# Patient Record
Sex: FEMALE | Race: WHITE | NOT HISPANIC OR LATINO | Employment: FULL TIME | URBAN - METROPOLITAN AREA
[De-identification: names, ages, dates, MRNs, and addresses within clinical notes are randomized per-mention and may not be internally consistent; named-entity substitution may affect disease eponyms.]

---

## 2018-08-14 ENCOUNTER — OFFICE VISIT (OUTPATIENT)
Dept: PODIATRY | Facility: CLINIC | Age: 83
End: 2018-08-14
Payer: MEDICARE

## 2018-08-14 VITALS
HEART RATE: 63 BPM | DIASTOLIC BLOOD PRESSURE: 68 MMHG | HEIGHT: 65 IN | WEIGHT: 140 LBS | TEMPERATURE: 97 F | SYSTOLIC BLOOD PRESSURE: 131 MMHG | BODY MASS INDEX: 23.32 KG/M2

## 2018-08-14 DIAGNOSIS — L60.0 INGROWN NAIL: Primary | ICD-10-CM

## 2018-08-14 PROCEDURE — 99213 OFFICE O/P EST LOW 20 MIN: CPT | Mod: PBBFAC,PN | Performed by: PODIATRIST

## 2018-08-14 PROCEDURE — 99202 OFFICE O/P NEW SF 15 MIN: CPT | Mod: S$PBB,,, | Performed by: PODIATRIST

## 2018-08-14 PROCEDURE — 99999 PR PBB SHADOW E&M-EST. PATIENT-LVL III: CPT | Mod: PBBFAC,,, | Performed by: PODIATRIST

## 2018-08-14 RX ORDER — LEVOTHYROXINE SODIUM 88 UG/1
88 TABLET ORAL
COMMUNITY
Start: 2018-07-17

## 2018-08-14 RX ORDER — METOPROLOL SUCCINATE 25 MG/1
25 TABLET, EXTENDED RELEASE ORAL DAILY
COMMUNITY
Start: 2018-07-16

## 2018-08-19 NOTE — PROGRESS NOTES
Subjective:       Patient ID: Selene Posey is a 83 y.o. female.    Chief Complaint: Nail Problem (big toe nails on both feet) and Foot Problem  Patient presents with a complaint of thickened elongated ingrown toenails on both big toes she states this started shortly after she got a pedicure at a nail salon.  HPI  Review of Systems   Musculoskeletal: Positive for arthralgias, joint swelling and myalgias.   All other systems reviewed and are negative.      Objective:      Physical Exam   Constitutional: She appears well-developed and well-nourished.   Cardiovascular:   Pulses:       Dorsalis pedis pulses are 1+ on the right side, and 1+ on the left side.        Posterior tibial pulses are 0 on the right side, and 0 on the left side.   Musculoskeletal: Normal range of motion.   Feet:   Right Foot:   Protective Sensation: 4 sites tested. 4 sites sensed.   Skin Integrity: Positive for erythema.   Left Foot:   Protective Sensation: 4 sites tested. 4 sites sensed.   Skin Integrity: Positive for erythema.   Skin: Skin is warm. Capillary refill takes more than 3 seconds.   Psychiatric: She has a normal mood and affect. Her behavior is normal. Judgment and thought content normal.   Nursing note and vitals reviewed.      On evaluation patient has obviously fungal infected nails this has caused the nail to become thick incurvated and now display bacterial infection both medial lateral border bilateral hallux which has grown into the surrounding skin again causing the area to be did infected with positive pain upon palpation patient's bilateral hallux hallux nails are obviously infected with fungal involvement they are thickened elongated dystrophic and ingrown is noted.  Assessment:       1. Ingrown nail        Plan:         Following a complete new patient evaluation patient does have ingrown toenails both medial lateral border of the bilateral hallux nail this is secondary to fungal infected nails that have become very  thick and dystrophic I have advised the patient I was able to trim the nail out of medial lateral borders offering her significant relief however these are going to grow back the fact same way she needs to start treating these with Vicks vapor rub twice a day every day to kill the fungus in the nail which will make him easier to trim and easier to keep under control nail avulsion was not necessary on today's visit recommended follow-up as needed.

## 2019-09-17 ENCOUNTER — CLINICAL SUPPORT (OUTPATIENT)
Dept: REHABILITATION | Facility: HOSPITAL | Age: 84
End: 2019-09-17
Payer: MEDICARE

## 2019-09-17 DIAGNOSIS — M65.341 TRIGGER RING FINGER OF RIGHT HAND: Primary | ICD-10-CM

## 2019-09-17 DIAGNOSIS — M25.641 JOINT STIFFNESS OF HAND, RIGHT: ICD-10-CM

## 2019-09-17 PROCEDURE — 97010 HOT OR COLD PACKS THERAPY: CPT | Mod: PN

## 2019-09-17 PROCEDURE — 97165 OT EVAL LOW COMPLEX 30 MIN: CPT | Mod: PN

## 2019-09-17 PROCEDURE — 97035 APP MDLTY 1+ULTRASOUND EA 15: CPT | Mod: PN

## 2019-09-17 NOTE — PLAN OF CARE
[facility]  Occupational Therapy Initial Evaluation    Name: Selene Posey  Clinic Number: [id number]    Therapy Diagnosis:   Encounter Diagnosis   Name Primary?    Trigger ring finger of right hand Yes     Physician: Kofi Ibarra MD    Physician Orders: OT Eval and Treat   Medical Diagnosis from Referral: R ring finger trigger finger   Evaluation Date: 9/17/2019  Authorization Period Expiration: 12/31/2019  Plan of Care Expiration: 10/25/2019  Visit # / Visits authorized: 1    Time In: 1000  Time Out: 1045  Total Billable Time: 45 minutes    Precautions: Standard    Subjective   Date of onset: Patient reports having a ganglion cyst removed several months ago and reporting increasing stiffness of R 3rd, 4th, and 5th digits  History of current condition - Janelle reports: Morning stiffness affecting 3rd, 4th, and 5th digits at the PIP joints. She report stiffness is the worst in the night/morning and once she begins moving her digits, it improve. She reports that while having ganglion cyst removed, she also had trigger finger of R middle finger, and they performed surgery on it, but now complains of trigger finger of R ring finger. Patient also reports feeling numbness in fingertips of R hand at night sometimes.     Medical History:   No past medical history on file.    Surgical History:   Selene Posey  has a past surgical history that includes Appendectomy and Hysterectomy.    Medications:   Selene has a current medication list which includes the following prescription(s): levothyroxine, levothyroxine, metoprolol succinate, omeprazole, and pantoprazole.    Allergies:   Review of patient's allergies indicates:   Allergen Reactions    Codeine     Erythropoietin analogues     Pcn [penicillins]     Sulfa (sulfonamide antibiotics)      Imaging, none available in Epic    Prior Therapy: NA  Social History:  lives alone  Occupation: Real Estate   Prior Level of Function:  Independent  Current Level of Function: Modified Independent-buys items already cut up to reduce cutting with R hand     Pain:  Current 7/10, worst 9/10  Location: right digits 3rd, 4th, and 5th   Description: stiff  Aggravating Factors: Morning  Easing Factors: moving and working out digits     Pts goals:   To correct trigger finger and reduce stiffness of joints R fingers    Objective     OT received consult for 84 year old female with R ring finger trigger finger in addition to stiffness of PIP's at R ring, middle, and little fingers. AROM fingertips to palm WNL; however, catching/lagging of R ring finger present.       TREATMENT   Treatment Time In: 1020  Treatment Time Out: 1045  Total Treatment time separate from Evaluation: 25 minutes    Janelle received hot pack for 12 minutes to R hand/digits.    Janelle received the following direct contact modalities after being cleared for contraindications: Ultrasound:  Selene received ultrasound to manage pain and inflammation at 100 % duty cycle applied to the R hand dorsal and volar at an intensity of  3.3 MHz number entry box 2.0  W/cm2  for a duration of 10 minutes. Patient tolerated treatment well without adverse effects. Therapist was in attendance throughout intervention.    Home Exercises and Patient Education Provided    Education provided:   - Educational material on trigger finger     Written Home Exercises Provided: yes.  Exercises were reviewed and Janelle was able to demonstrate them prior to the end of the session.  Janelle demonstrated good  understanding of the education provided.     See EMR under Media for exercises provided 9/17/2019.    Assessment   Selene is a 84 y.o. female referred to outpatient Occupational Therapy with a medical diagnosis of R ring finger trigger finger. Pt presents with symptomatic catching/trigger finger in addition to complaints of joint stiffness of PIP's of R middle, ring, and little fingers.    Pt prognosis is Good.    Pt will benefit from skilled outpatient Occupational Therapy to address the deficits stated above and in the chart below, provide pt/family education, and to maximize pt's level of independence.     Plan of care discussed with patient: Yes  Pt's spiritual, cultural and educational needs considered and patient is agreeable to the plan of care and goals as stated below:     Anticipated Barriers for therapy: none    Goals:  Patient will receive modalities to R digits to decrease joint stiffness/pain and improve flexibility.  Patient will report pain <4/10 in 3 consecutive treatments R digits 3, 4, and 5.  OT to continually assess for splint needs R ring finger.   Patient will demonstrate independence in HEP at discharge.     Plan   Plan of care Certification: 9/17/2019 to 10/25/2019    Outpatient Occupational Therapy 2 times weekly for 0 weeks to include the following interventions: Moist Heat/ Ice, Paraffin, Therapeutic Exercise, Ultrasound and massage to prevent adhesions.     Mable Peguero, OT   Signature of Therapist    I certify the need for these services furnished under this plan of treatment and while under my care.______________________________                           Physician/Referring Practitioner  Date of Signature:

## 2019-09-17 NOTE — PROGRESS NOTES
[facility]  Occupational Therapy Initial Evaluation    Name: Selene Posey  Clinic Number: [id number]    Therapy Diagnosis:   Encounter Diagnosis   Name Primary?    Trigger ring finger of right hand Yes     Physician: Kofi Ibarra MD    Physician Orders: OT Eval and Treat   Medical Diagnosis from Referral: R ring finger trigger finger   Evaluation Date: 9/17/2019  Authorization Period Expiration: 12/31/2019  Plan of Care Expiration: 10/25/2019  Visit # / Visits authorized: 1    Time In: 1000  Time Out: 1045  Total Billable Time: 45 minutes    Precautions: Standard    Subjective   Date of onset: Patient reports having a ganglion cyst removed several months ago and reporting increasing stiffness of R 3rd, 4th, and 5th digits  History of current condition - Janelle reports: Morning stiffness affecting 3rd, 4th, and 5th digits at the PIP joints. She report stiffness is the worst in the night/morning and once she begins moving her digits, it improve. She reports that while having ganglion cyst removed, she also had trigger finger of R middle finger, and they performed surgery on it, but now complains of trigger finger of R ring finger. Patient also reports feeling numbness in fingertips of R hand at night sometimes.     Medical History:   No past medical history on file.    Surgical History:   Selene Posey  has a past surgical history that includes Appendectomy and Hysterectomy.    Medications:   Selene has a current medication list which includes the following prescription(s): levothyroxine, levothyroxine, metoprolol succinate, omeprazole, and pantoprazole.    Allergies:   Review of patient's allergies indicates:   Allergen Reactions    Codeine     Erythropoietin analogues     Pcn [penicillins]     Sulfa (sulfonamide antibiotics)      Imaging, none available in Epic    Prior Therapy: NA  Social History:  lives alone  Occupation: Real Estate   Prior Level of Function:  Independent  Current Level of Function: Modified Independent-buys items already cut up to reduce cutting with R hand     Pain:  Current 7/10, worst 9/10  Location: right digits 3rd, 4th, and 5th   Description: stiff  Aggravating Factors: Morning  Easing Factors: moving and working out digits     Pts goals:   To correct trigger finger and reduce stiffness of joints R fingers    Objective     OT received consult for 84 year old female with R ring finger trigger finger in addition to stiffness of PIP's at R ring, middle, and little fingers. AROM fingertips to palm WNL; however, catching/lagging of R ring finger present.       TREATMENT   Treatment Time In: 1020  Treatment Time Out: 1045  Total Treatment time separate from Evaluation: 25 minutes    Janelle received hot pack for 12 minutes to R hand/digits.    Janelle received the following direct contact modalities after being cleared for contraindications: Ultrasound:  Selene received ultrasound to manage pain and inflammation at 100 % duty cycle applied to the R hand dorsal and volar at an intensity of  3.3 MHz number entry box 2.0  W/cm2  for a duration of 10 minutes. Patient tolerated treatment well without adverse effects. Therapist was in attendance throughout intervention.    Home Exercises and Patient Education Provided    Education provided:   - Educational material on trigger finger     Written Home Exercises Provided: yes.  Exercises were reviewed and Janelle was able to demonstrate them prior to the end of the session.  Janelle demonstrated good  understanding of the education provided.     See EMR under Media for exercises provided 9/17/2019.    Assessment   Selene is a 84 y.o. female referred to outpatient Occupational Therapy with a medical diagnosis of R ring finger trigger finger. Pt presents with symptomatic catching/trigger finger in addition to complaints of joint stiffness of PIP's of R middle, ring, and little fingers.    Pt prognosis is Good.    Pt will benefit from skilled outpatient Occupational Therapy to address the deficits stated above and in the chart below, provide pt/family education, and to maximize pt's level of independence.     Plan of care discussed with patient: Yes  Pt's spiritual, cultural and educational needs considered and patient is agreeable to the plan of care and goals as stated below:     Anticipated Barriers for therapy: none    Goals:  Patient will receive modalities to R digits to decrease joint stiffness/pain and improve flexibility.  Patient will report pain <4/10 in 3 consecutive treatments R digits 3, 4, and 5.  OT to continually assess for splint needs R ring finger.   Patient will demonstrate independence in HEP at discharge.     Plan   Plan of care Certification: 9/17/2019 to 10/25/2019    Outpatient Occupational Therapy 2 times weekly for 0 weeks to include the following interventions: Moist Heat/ Ice, Paraffin, Therapeutic Exercise, Ultrasound and massage to prevent adhesions.     Mable Peguero, OT   Signature of Therapist    I certify the need for these services furnished under this plan of treatment and while under my care.______________________________                           Physician/Referring Practitioner  Date of Signature:

## 2019-09-26 ENCOUNTER — CLINICAL SUPPORT (OUTPATIENT)
Dept: REHABILITATION | Facility: HOSPITAL | Age: 84
End: 2019-09-26
Payer: MEDICARE

## 2019-09-26 DIAGNOSIS — M65.341 TRIGGER RING FINGER OF RIGHT HAND: Primary | ICD-10-CM

## 2019-09-26 PROCEDURE — 97010 HOT OR COLD PACKS THERAPY: CPT | Mod: PN

## 2019-09-26 PROCEDURE — 97124 MASSAGE THERAPY: CPT | Mod: PN

## 2019-09-26 PROCEDURE — 97035 APP MDLTY 1+ULTRASOUND EA 15: CPT | Mod: PN

## 2019-09-26 NOTE — PROGRESS NOTES
Occupational Therapy Daily Treatment Note     Name: Selene Posey  Clinic Number: [id number]    Therapy Diagnosis:   Encounter Diagnosis   Name Primary?    Trigger ring finger of right hand Yes     Physician: Kofi Ibarra MD    Visit Date: 9/26/2019    Physician Orders: trigger finger  Medical Diagnosis: trigger finger R ring finger  Evaluation Date: 09/17/2019  Authorization Period Expiration: 12/31/2019  Plan of Care Certification Period: 10/25/2019  Visit #/Visits authorized: 2    Time In: 1000  Time Out: 1055  Total Billable Time: 50 minutes    Precautions: Standard    Subjective     Pt reports: R hand pain this AM with stiffness  She was compliant with home exercise program.  Response to previous treatment: tolerated tx w  Functional change: no change from eval    Pain: 5/10  Location: right hand//fingers      Objective     Janelle received hot pack for 15 minutes to R hand secondary to stiffness.     Janelle received therapeutic exercises to develop strength and ROM  including:  AROM R hand flex/ext of digits     Janelle received the following manual therapy techniques: friction massage applied to the: R palm and digits middle and ring fingers for 12 minutes.    Janelle received the following direct contact modalities after being cleared for contraindications: Ultrasound:  Selene received ultrasound to manage pain and inflammation at 100 % duty cycle applied to the R palm of hand and digits at an intensity of  number entry box 3.3 MHZ 2.0 W/cm2  for a duration of 10 minutes. Patient tolerated treatment well without adverse effects. Therapist was in attendance throughout intervention.      Home Exercises Provided and Patient Education Provided     Education provided:   - HEP    Written Home Exercises Provided: yes.  Exercises were reviewed and Janelle was able to demonstrate them prior to the end of the session.  Janelle demonstrated good  understanding of the education provided.     See EMR under Media  for exercises provided prior visit.    Assessment     Janelle is progressing well towards her goals.   Pt prognosis is Good.     Pt will continue to benefit from skilled outpatient occupational therapy to address the deficits listed in the problem list box on initial evaluation, provide pt/family education and to maximize pt's level of independence in the home and community environment.     Pt's spiritual, cultural and educational needs considered and pt agreeable to plan of care and goals.     Anticipated barriers to occupational therapy: none    Goals:   Goals:  Patient will receive modalities to R digits to decrease joint stiffness/pain and improve flexibility.  Patient will report pain <4/10 in 3 consecutive treatments R digits 3, 4, and 5.  OT to continually assess for splint needs R ring finger.   Patient will demonstrate independence in HEP at discharge.       Plan     Continue OT per established POC.    Mable Peguero, OT

## 2019-10-03 ENCOUNTER — CLINICAL SUPPORT (OUTPATIENT)
Dept: REHABILITATION | Facility: HOSPITAL | Age: 84
End: 2019-10-03
Payer: MEDICARE

## 2019-10-03 DIAGNOSIS — M65.341 TRIGGER RING FINGER OF RIGHT HAND: Primary | ICD-10-CM

## 2019-10-03 PROCEDURE — 97010 HOT OR COLD PACKS THERAPY: CPT | Mod: PN

## 2019-10-03 PROCEDURE — 97035 APP MDLTY 1+ULTRASOUND EA 15: CPT | Mod: PN

## 2019-10-03 PROCEDURE — 97110 THERAPEUTIC EXERCISES: CPT | Mod: PN

## 2019-10-03 PROCEDURE — 97124 MASSAGE THERAPY: CPT | Mod: PN

## 2019-10-03 NOTE — PROGRESS NOTES
Occupational Therapy Daily Treatment Note     Name: Selene Posey  Clinic Number: [id number]    Therapy Diagnosis:   Encounter Diagnosis   Name Primary?    Trigger ring finger of right hand Yes     Physician: Kofi Ibarra MD    Visit Date: 10/3/2019    Physician Orders: trigger finger  Medical Diagnosis: trigger finger R ring finger  Evaluation Date: 09/17/2019  Authorization Period Expiration: 12/31/2019  Plan of Care Certification Period: 10/25/2019  Visit #/Visits authorized: 3    Time In: 1100  Time Out: 1200  Total Billable Time: 55 minutes    Precautions: Standard    Subjective     Pt reports: R hand pain this AM with stiffness  She was compliant with home exercise program.  Response to previous treatment: tolerated tx w  Functional change: no change from eval    Pain: 6/10  Location: right hand//fingers      Objective     Janelle received hot pack for 15 minutes to R hand secondary to stiffness.     Janelle received therapeutic exercises to develop strength and ROM  including:  AROM R hand flex/ext of digits   Clothespin tree RUE completed all resist levels     Janelle received the following manual therapy techniques: friction massage applied to the: R palm and digits middle and ring fingers for 12 minutes.    Janelle received the following direct contact modalities after being cleared for contraindications: Ultrasound:  Selene received ultrasound to manage pain and inflammation at 100 % duty cycle applied to the R palm of hand and digits at an intensity of  number entry box 3.3 MHZ 2.0 W/cm2  for a duration of 10 minutes. Patient tolerated treatment well without adverse effects. Therapist was in attendance throughout intervention.      Home Exercises Provided and Patient Education Provided     Education provided:   - HEP    Written Home Exercises Provided: yes.  Exercises were reviewed and Janelle was able to demonstrate them prior to the end of the session.  Janelle demonstrated good  understanding of  the education provided.     See EMR under Media for exercises provided prior visit.    Assessment     Janelle is progressing well towards her goals.   Pt prognosis is Good.     Pt will continue to benefit from skilled outpatient occupational therapy to address the deficits listed in the problem list box on initial evaluation, provide pt/family education and to maximize pt's level of independence in the home and community environment.     Pt's spiritual, cultural and educational needs considered and pt agreeable to plan of care and goals.     Anticipated barriers to occupational therapy: none    Goals:   Goals:  Patient will receive modalities to R digits to decrease joint stiffness/pain and improve flexibility.  Patient will report pain <4/10 in 3 consecutive treatments R digits 3, 4, and 5.  OT to continually assess for splint needs R ring finger.   Patient will demonstrate independence in HEP at discharge.       Plan     Continue OT per established POC.    Mable Peguero, OT

## 2019-10-10 ENCOUNTER — CLINICAL SUPPORT (OUTPATIENT)
Dept: REHABILITATION | Facility: HOSPITAL | Age: 84
End: 2019-10-10
Payer: MEDICARE

## 2019-10-10 DIAGNOSIS — M65.341 TRIGGER RING FINGER OF RIGHT HAND: Primary | ICD-10-CM

## 2019-10-10 PROCEDURE — 97010 HOT OR COLD PACKS THERAPY: CPT | Mod: PN

## 2019-10-10 PROCEDURE — 97035 APP MDLTY 1+ULTRASOUND EA 15: CPT | Mod: PN

## 2019-10-10 PROCEDURE — 97124 MASSAGE THERAPY: CPT | Mod: PN

## 2019-10-10 NOTE — PROGRESS NOTES
Occupational Therapy Daily Treatment Note     Name: Selene Posey  Clinic Number: [id number]    Therapy Diagnosis:   Encounter Diagnosis   Name Primary?    Trigger ring finger of right hand Yes     Physician: Kofi Ibarra MD    Visit Date: 10/10/2019    Physician Orders: trigger finger  Medical Diagnosis: trigger finger R ring finger  Evaluation Date: 09/17/2019  Authorization Period Expiration: 12/31/2019  Plan of Care Certification Period: 10/25/2019  Visit #/Visits authorized: 5    Time In: 1100  Time Out: 1200  Total Billable Time: 50 minutes    Precautions: Standard    Subjective     Pt reports: improvement in R hand pain, but reports her issue is the trigger finger and thinks she may have to have surgery.  She was compliant with home exercise program.  Response to previous treatment: tolerated tx w  Functional change: no change from eval    Pain: 1/10  Location: right hand//fingers      Objective     Janelle received hot pack for 15 minutes to R hand secondary to stiffness.      Janelle received the following manual therapy techniques: friction massage applied to the: R palm and digits middle and ring fingers for 15 minutes.    Janelle received the following direct contact modalities after being cleared for contraindications: Ultrasound:  Selene received ultrasound to manage pain and inflammation at 100 % duty cycle applied to the R palm of hand and digits at an intensity of  number entry box 3.3 MHZ 2.0 W/cm2  for a duration of 10 minutes. Patient tolerated treatment well without adverse effects. Therapist was in attendance throughout intervention.      Home Exercises Provided and Patient Education Provided     Education provided:   - HEP    Written Home Exercises Provided: yes.  Exercises were reviewed and Janelle was able to demonstrate them prior to the end of the session.  Janelle demonstrated good  understanding of the education provided.     See EMR under Media for exercises provided prior  visit.    Assessment     Janelle is progressing well towards her goals.   Pt prognosis is Good.     Pt will continue to benefit from skilled outpatient occupational therapy to address the deficits listed in the problem list box on initial evaluation, provide pt/family education and to maximize pt's level of independence in the home and community environment.     Pt's spiritual, cultural and educational needs considered and pt agreeable to plan of care and goals.     Anticipated barriers to occupational therapy: none    Goals:   Goals:  Patient will receive modalities to R digits to decrease joint stiffness/pain and improve flexibility.  Patient will report pain <4/10 in 3 consecutive treatments R digits 3, 4, and 5.  OT to continually assess for splint needs R ring finger.   Patient will demonstrate independence in HEP at discharge.       Plan     Continue OT per established POC.    Mable Peguero, OT

## 2019-12-06 DIAGNOSIS — Z12.31 ENCOUNTER FOR SCREENING MAMMOGRAM FOR MALIGNANT NEOPLASM OF BREAST: Primary | ICD-10-CM

## 2019-12-10 DIAGNOSIS — Z12.31 ENCOUNTER FOR SCREENING MAMMOGRAM FOR MALIGNANT NEOPLASM OF BREAST: Primary | ICD-10-CM

## 2020-01-03 ENCOUNTER — HOSPITAL ENCOUNTER (OUTPATIENT)
Dept: RADIOLOGY | Facility: HOSPITAL | Age: 85
Discharge: HOME | End: 2020-01-03
Attending: NURSE PRACTITIONER
Payer: MEDICARE

## 2020-01-03 VITALS — HEIGHT: 65 IN | BODY MASS INDEX: 23.32 KG/M2 | WEIGHT: 140 LBS

## 2020-01-03 DIAGNOSIS — Z12.31 ENCOUNTER FOR SCREENING MAMMOGRAM FOR MALIGNANT NEOPLASM OF BREAST: ICD-10-CM

## 2020-01-03 PROCEDURE — 77067 SCR MAMMO BI INCL CAD: CPT | Mod: TC,PO

## 2020-02-14 ENCOUNTER — CLINICAL SUPPORT (OUTPATIENT)
Dept: REHABILITATION | Facility: HOSPITAL | Age: 85
End: 2020-02-14
Payer: MEDICARE

## 2020-02-14 DIAGNOSIS — R29.898 WEAKNESS OF LEFT LEG: ICD-10-CM

## 2020-02-14 DIAGNOSIS — M17.12 DEGENERATIVE ARTHRITIS OF LEFT KNEE: ICD-10-CM

## 2020-02-14 DIAGNOSIS — M25.569 KNEE PAIN, UNSPECIFIED CHRONICITY, UNSPECIFIED LATERALITY: Primary | ICD-10-CM

## 2020-02-14 DIAGNOSIS — M17.12 DEGENERATIVE ARTHRITIS OF LEFT KNEE: Primary | ICD-10-CM

## 2020-02-14 PROCEDURE — 97110 THERAPEUTIC EXERCISES: CPT | Mod: PN

## 2020-02-14 PROCEDURE — 98960 EDU&TRN PT SELF-MGMT NQHP 1: CPT | Mod: PN

## 2020-02-14 PROCEDURE — 97161 PT EVAL LOW COMPLEX 20 MIN: CPT | Mod: PN

## 2020-02-14 PROCEDURE — 97014 ELECTRIC STIMULATION THERAPY: CPT | Mod: PN

## 2020-02-14 PROCEDURE — 97010 HOT OR COLD PACKS THERAPY: CPT | Mod: PN

## 2020-02-14 NOTE — PROGRESS NOTES
Evaluation complete. See POC section for details.  Supervised face to face visit with Chris Kaur PTA to discuss, current level of function, POC and progress toward goals.

## 2020-02-18 ENCOUNTER — CLINICAL SUPPORT (OUTPATIENT)
Dept: REHABILITATION | Facility: HOSPITAL | Age: 85
End: 2020-02-18
Payer: MEDICARE

## 2020-02-18 DIAGNOSIS — R26.2 DIFFICULTY WALKING: ICD-10-CM

## 2020-02-18 PROCEDURE — 97116 GAIT TRAINING THERAPY: CPT | Mod: PN

## 2020-02-18 PROCEDURE — 97010 HOT OR COLD PACKS THERAPY: CPT | Mod: PN

## 2020-02-18 PROCEDURE — 97110 THERAPEUTIC EXERCISES: CPT | Mod: PN

## 2020-02-18 PROCEDURE — 97032 APPL MODALITY 1+ESTIM EA 15: CPT | Mod: PN

## 2020-02-20 ENCOUNTER — CLINICAL SUPPORT (OUTPATIENT)
Dept: REHABILITATION | Facility: HOSPITAL | Age: 85
End: 2020-02-20
Payer: MEDICARE

## 2020-02-20 DIAGNOSIS — Z96.652 STATUS POST TOTAL LEFT KNEE REPLACEMENT: Primary | ICD-10-CM

## 2020-02-20 DIAGNOSIS — R26.2 DIFFICULTY WALKING: ICD-10-CM

## 2020-02-20 DIAGNOSIS — R29.898 WEAKNESS OF RIGHT LOWER EXTREMITY: ICD-10-CM

## 2020-02-20 PROBLEM — Z96.651 STATUS POST TOTAL RIGHT KNEE REPLACEMENT: Status: ACTIVE | Noted: 2020-02-20

## 2020-02-20 PROCEDURE — 97140 MANUAL THERAPY 1/> REGIONS: CPT | Mod: PN

## 2020-02-20 PROCEDURE — 97110 THERAPEUTIC EXERCISES: CPT | Mod: PN

## 2020-02-20 NOTE — PROGRESS NOTES
"                                                    Physical Therapy Daily Note     Name: Selene Posey  Clinic Number: [id number]  Diagnosis:   Encounter Diagnoses   Name Primary?    Status post total left knee replacement Yes    Difficulty walking     Weakness of right lower extremity      Physician: Kofi Ibarra MD  Precautions: standard   Visit #: 3 of 18  PTA Visit #: 0  Time In: 11:00 AM   Time Out: 12:00 AM     Subjective     Pt reports: "I'm going to see Dr. Ibarra this afternoon, and I'm going to aske him about these stockings, I don't want to wear them anymore".   Pain Scale: Selene rates pain on a scale of 0-10 to be 3 currently.    Objective     Selene received individual therapeutic exercises to develop strength and ROM for 35 minutes including:  Nu-Step Level 5 x 15 mins   Quad Sets x 3 mins   Ball Squeezes x 2 mins   SLR x 15 reps  Bridges x 10  Step-ups x 20     Educated Janelle on not rolling down her knee highs over her John hose - creating increased swelling in her ankles;     Gait:  Without use of quad cane - instructed on proper heel strike with right LE and progression of heel to toe movement of foot; Able to demonstrate good balance and stability without use of AD on level surfaces.     Selene received the following manual therapy techniques: Joint mobilizations and Soft tissue Mobilization were applied to the: right knee  for 15 minutes including:  STM to distal quad; patellar and incisional mobilization in all planes;  Joint mobilization into flexion - anterior to posterior mobilization of tibia on femur to improve flexion      AROM Right Knee:  1 - 98 degrees; After mobilization of knee flexion improved to 102 degrees; Passively able to achieve "zero" extension.     The patient received the following direct contact modalities after being cleared for contraindications:     The patient received the following supervised modalities after being cleared for contradictions: "     Written Home Exercises Provided:   Quad Sets, Heel Slides; Walking   Pt demo good understanding of the education provided. Selene demonstrated good return demonstration of activities.     Education provided re:  Selene verbalized good understanding of education provided.   No spiritual or educational barriers to learning provided    Assessment     Patient tolerated treatment well; She was able to demonstrate improved ability to activate her quad mm after some practice and verbal visual cueing; Encouraged Janelle to work on this at home.   This is a 84 y.o. female referred to outpatient physical therapy and presents with a medical diagnosis of s/p Right TKA  and demonstrates limitations as described in the problem list. Pt prognosis is Good. Pt will continue to benefit from skilled outpatient physical therapy to address the deficits listed in the problem list, provide pt/family education and to maximize pt's level of independence in the home and community environment.     Goals as follows:  See Eval      Plan     Continue with established Plan of Care towards PT goals. 2-3x/week x 4-6 weeks.     Therapist: Lori Piper, PT  2/20/2020

## 2020-02-21 NOTE — PLAN OF CARE
OUTPATIENT THERAPY A  PHYSICAL THERAPY INITIAL EVALUATION    Name: Selene Posey  Clinic Number: [id number]    Evaluation Date: 2/14/2020  Visit #: 1 / 12  Authorization period Expiration:   Plan of Care Expiration: 5/14/2020    Diagnosis:   Encounter Diagnoses   Name Primary?    Degenerative arthritis of left knee     Knee pain, unspecified chronicity, unspecified laterality Yes    Weakness of left leg      Physician: Kofi Ibarra MD  Treatment Orders: PT Eval and Treat  No past medical history on file.  has a current medication list which includes the following prescription(s): levothyroxine, levothyroxine, metoprolol succinate, omeprazole, and pantoprazole.  Review of patient's allergies indicates:   Allergen Reactions    Codeine     Erythropoietin analogues     Pcn [penicillins]     Sulfa (sulfonamide antibiotics)        History   Prior Therapy: yes HH prior to OP  Social History: lives alone; no steps in home  Previous functional status: ind, limited by pain; no AD  Current functional status: pt has recently returned to work working full days with adverse effects to include inc swelling and pain, fatigue, gait abnormality  Work:     Subjective   History of Present Illness: Pt is 5 weeks post op R TKR.   Patient states she was not told to avoid using pillow under her knee.  Ms Posey states she does have a walker she uses for long distances yet did not arrived to clinic with any AD. She is wearing her compression stocking at time  DOI: Jan 6, 2020  Imaging, none:   Pain: current 5/10, worst 6/10, best 4/10, Aching, Dull, Tight and Hot, intermittent  Radicular symptoms: no  Aggravating factors: walking  Easing factors: sitting, pillow under kneee  Precautions: wbat R TKR  Pts goals: to be normal    Objective   Mental status: alert; fatigued  Posture/ Alignment: Good    GAIT DEVIATIONS: Eslene amb with decreased vickie, decreased velocity of limb motion, decreased step length and  decreased swing-to-stance ratio.  Patient arrived to clinic amb without AD demonstrating antalgic gait pattern, while wearing dress shoes with short heel    ROM: R LE wnl  ROM: L knee  Ext arom -5 prom -3 deg                    L knee flex arom 95, prom 105  Strength: R LE 4+/5, L hip flex 3+5,  L knee ext 2+/5, L knee flex 3/5, L ankle 4/5  L SLR w lag    Special Tests:negative Homans's sign, neg calf squeeze    Palpation:tenderness around surgical incision; warm to touch; mod swelling in LE  Knee girth: mid patella: 44.5 cm                     10 in sup to patella:46.6 cmm                     10 in inf to patellaL  37.4 cm    Joint Play:  Swelling limiting joint movement    Pt/family was provided educational information, including:EXPLAINED TO PATIENT SHE WAS STILL CONSIDERED WBAT GOING INTO DETAIL OF THE MEANING.  REITERATED SHE SHOULD BE USING AD OF HER CHOICE TO HELP OFFSET FULL WBING ON L LE;    EXPLAINED TO PATIENT RISKS AND SECONDARY IMPAIRMENTS THAT CAN RESULT FROM USING PILLOW UNDER L KNEE FOR COMFORT. SHOWED HER HOW TO ELEVATED LE WHILE FACILITATING KNEE EXTENSION.      role of PT, goals for PT, scheduling - pt verbalized understanding. Discussed insurance plan with pt.     Functional Limitations Reporting     Category: mobility  Tool: LEFS  Score:30/80              63% Limitation     TREATMENT   Time In: 215 PM  Time Out: 319    Low eval, 1 te, 1 ifc, one on one edu  PROM L LE, QS, AP, QS x 10  IFC with ice x 15 min    Discussed Plan of Care with patient: Yes    Written Home Exercises Provided: upcoming session  Exercises were reviewed and Selene was able to demonstrate them prior to the end of the session. Pt received a written copy of exercises to perform at home. Selene demonstrated fair  understanding of the education provided.     Assessment   Selene is a 84 y.o. female referred to outpatient physical therapy with a medical diagnosis of 5 weeks post op LTKR due to OA.. Demonstrates  impairments including limitations as described in the problem list; swelling, pain, dec rom, gait and balance deficit Pt prognosis is Good. Positive prognostic factors include active lifestyle. Negative prognostic factors include compliance with wbing status, using pillow under knee for comfort.. Pt will benefit from skilled outpatient physical therapy to address the above stated deficits, provide pt/family education, and to maximize pt's level of independence.     Anticipated Barriers for therapy: return to work too soon without break to elevate LE  Pt's spiritual, cultural and educational needs considered and pt agreeable to plan of care and goals as stated below:     GOALS:   Long Term Goals: 4 weeks  Pain: Decrease pain to 0/10 to allow for return to normal activities and hobbies.   Strength: Improve strength in left knee to 4/5 or 4+/5for improved LE stability  ROM: Improve ROM to 0-0-120 of normal limits   Functional scale: Improve score on LEFS to 20% or less from 63%.  Walking: Increase walking distance/duration to 1 mile without pain  Stairs: pt will be able to neg up and down a flight of steps with one handrail ind  Postures: Increase sitting and/or standing duration to 1 hour without pain   Transfers: Perform all transfers without increased pain or limitation  Exercise: demonstrate independence with home exercise program to maintain gains made in therapy.      Plan   Certification Period: 2/14/2020 to 5/14/2020.    Outpatient physical therapy 3 times weekly to include: Aquatic Therapy, Electrical Stimulation russian/IFC, Fluidotherapy, Gait Training, Iontophoresis (with ket), Manual Therapy, Moist Heat/ Ice, Neuromuscular Re-ed, Orthotic Management and Training, Paraffin, Patient Education, Self Care, Therapeutic Activites, Therapeutic Exercise, Ultrasound and DN prn. Cont PT for 4 weeks.  Pt may be seen by PTA as part of the rehabilitation team.     I certify the need for these services furnished under  this plan of treatment and while under my care.    Violette Chicas, PT

## 2020-02-21 NOTE — PROGRESS NOTES
"                            Physical Therapy Daily Treatment Note     Name: Selene Posey  Clinic Number: [id number]    Therapy Diagnosis: No diagnosis found.  Physician: Kofi Ibarra MD    Visit Date: 2/18/2020    Evaluation Date: 2/14/2020  Visit #: 1 / 12  Authorization period Expiration:   Plan of Care Expiration: 5/14/2020     Diagnosis:        Encounter Diagnoses   Name Primary?    Degenerative arthritis of left knee      Knee pain, unspecified chronicity, unspecified laterality Yes    Weakness of left leg        Physician: Kofi Ibarra MD  Treatment Orders: PT Eval and Treat  No past medical history on file.  has a current medication list which includes the following prescription(s): levothyroxine, levothyroxine, metoprolol succinate, omeprazole, and pantoprazole.       Review of patient's allergies indicates:   Allergen Reactions    Codeine      Erythropoietin analogues      Pcn [penicillins]      Sulfa (sulfonamide anti      Time In: 345  Time Out: 500  Total Billable Time: 75 minutes    Precautions: Weightbearing    Subjective     Pt reports: "should it be this swollen". I worked a full day. She did have an assisted device.  Sh was wearing support hose.  She was not compliant with home exercise program.  Response to previous treatment:   Functional change: remains swollen and painful    Pain: 8/10  Location: left knee      Objective     Janelle received therapeutic exercises to develop strength, endurance, ROM, flexibility, posture and core stabilization for 30 minutes including:  2 x 10 secs  AP  QS  HS   Flex/ext/add/abd  Slow sustained stretch with towel roll      Janelle received the following supervised modalities after being cleared for contradictions: NMES Electrical Stimulation:  Selene received NMES Electrical Stimulation to elicit muscle contraction of the quad 10 sec on 10 sec off x 15 min    Gait tx with SC indoors x 50 feet min a    Home Exercises Provided and Patient " Education Provided     Education provided:   - safety with transfers and gait, proper lifting technique, positioning for posture, pain managemtent    Written Home Exercises Provided: yes.  Exercises were reviewed and Janellewas able to demonstrate them prior to the end of the session.  Janelle demonstrated fair  understanding of the education provided.     See EMR under for exercises provided     Assessment     improved swelling and pain    Janelle is progressing well towards her goals.   Pt prognosis is Good.     Pt will continue to benefit from skilled outpatient physical therapy to address the deficits listed in the problem list box on initial evaluation, provide pt/family education and to maximize pt's level of independence in the home and community environment.     Pt's spiritual, cultural and educational needs considered and pt agreeable to plan of care and goals.    Anticipated barriers to physical therapy: compliance    Goals: see eval    Plan     Progress toward POC and progress    Violette Chicas, PT

## 2020-02-26 ENCOUNTER — CLINICAL SUPPORT (OUTPATIENT)
Dept: REHABILITATION | Facility: HOSPITAL | Age: 85
End: 2020-02-26
Payer: MEDICARE

## 2020-02-26 DIAGNOSIS — Z96.652 STATUS POST TOTAL LEFT KNEE REPLACEMENT: Primary | ICD-10-CM

## 2020-02-26 DIAGNOSIS — R26.2 DIFFICULTY WALKING: ICD-10-CM

## 2020-02-26 DIAGNOSIS — R29.898 WEAKNESS OF LEFT LOWER EXTREMITY: ICD-10-CM

## 2020-02-26 PROCEDURE — 97110 THERAPEUTIC EXERCISES: CPT | Mod: PN

## 2020-02-26 PROCEDURE — 97140 MANUAL THERAPY 1/> REGIONS: CPT | Mod: PN

## 2020-02-26 NOTE — PROGRESS NOTES
"                                                    Physical Therapy Daily Note     Name: Selene Posey  Clinic Number: [id number]  Diagnosis:   Encounter Diagnoses   Name Primary?    Status post total left knee replacement Yes    Weakness of left lower extremity     Difficulty walking      Physician: Kofi Ibarra MD  Precautions: standard   Visit #: 4 of 18  PTA Visit #: 0  Time In: 3:00 PM   Time Out: 3:50 PM     Subjective     Pt reports: "I'm doing fine";  Janelle saw Dr. Ibarra last week, he was pleased with progress thus far.   Pain Scale: Selene rates pain on a scale of 0-10 to be 3 currently.    Objective     Selene received individual therapeutic exercises to develop strength and ROM for 35 minutes including:  Nu-Step Level 2 x 20 mins   Heel Slides 10 x 2    Quad Sets x 3 mins   Ball Squeezes x 3 mins   SLR x 10 x 2  Bridges x 10  Hamstring curls - black band x 25        Selene received the following manual therapy techniques: Joint mobilizations and Soft tissue Mobilization were applied to the: Left knee  for 15 minutes including:  STM to distal quad; patellar and incisional mobilization in all planes;  Joint mobilization into flexion - anterior to posterior mobilization of tibia on femur to improve flexion      AROM Left t Knee: 1 - 111 degrees; Passively able to get to Zero.      The patient received the following direct contact modalities after being cleared for contraindications:     The patient received the following supervised modalities after being cleared for contradictions:     Written Home Exercises Provided:   Quad Sets, Heel Slides; Walking   Pt demo good understanding of the education provided. Selene demonstrated good return demonstration of activities.     Education provided re:  Selene verbalized good understanding of education provided.   No spiritual or educational barriers to learning provided    Assessment     Patient tolerated treatment well; She was able to " demonstrate improved ability to activate her quad mm today along with improved active flexion.  Janelle's goal for her knee replacement was just to be able to walk without pain, and she is doing that so her interest in other exercises is not great.  She is compliant and willing to do what is asked of her, but does not liked to be over worked.   This is a 84 y.o. female referred to outpatient physical therapy and presents with a medical diagnosis of s/pLeft  TKA  and demonstrates limitations as described in the problem list. Pt prognosis is Good. Pt will continue to benefit from skilled outpatient physical therapy to address the deficits listed in the problem list, provide pt/family education and to maximize pt's level of independence in the home and community environment.     Goals as follows:  See Eval      Plan     Continue with established Plan of Care towards PT goals. 2-3x/week x 4-6 weeks.     Therapist: Lori Piper, PT  2/26/2020

## 2020-02-28 ENCOUNTER — CLINICAL SUPPORT (OUTPATIENT)
Dept: REHABILITATION | Facility: HOSPITAL | Age: 85
End: 2020-02-28
Payer: MEDICARE

## 2020-02-28 DIAGNOSIS — Z96.652 STATUS POST TOTAL LEFT KNEE REPLACEMENT: Primary | ICD-10-CM

## 2020-02-28 PROCEDURE — 97110 THERAPEUTIC EXERCISES: CPT | Mod: PN,CQ

## 2020-02-28 NOTE — PROGRESS NOTES
Physical Therapy Daily Note     Name: Selene Posey  Clinic Number: [id number]  Diagnosis:   Encounter Diagnosis   Name Primary?    Status post total left knee replacement Yes     Physician: Kofi Ibarra MD  Precautions: standard   Visit #: 5 of 18  PTA Visit #: 1  Time In: 12:50 PM   Time Out: 1:50 PM     Subjective     Pt reports: No new c/o's.   Pain Scale: Selene rates pain on a scale of 0-10 to be 4-5 currently.    Objective     Selene received individual therapeutic exercises to develop strength and ROM for 35 minutes including:  Nu-Step Level 5 x 20 mins   Heel Slides 10 x 3   Quad Sets x 3 mins   Ball Squeezes x 3 mins   SLR  10 x 2  Bridges x 10  Hamstring curls - black band x 20      DNP  Selene received the following manual therapy techniques: Joint mobilizations and Soft tissue Mobilization were applied to the: Left knee  for 15 minutes including:  STM to distal quad; patellar and incisional mobilization in all planes;  Joint mobilization into flexion - anterior to posterior mobilization of tibia on femur to improve flexion      AROM Left t Knee: 1 - 111 degrees; Passively able to get to Zero.      The patient received the following direct contact modalities after being cleared for contraindications:     The patient received the following supervised modalities after being cleared for contradictions:     Written Home Exercises Provided:   Quad Sets, Heel Slides; Walking   Pt demo good understanding of the education provided. Selene demonstrated good return demonstration of activities.     Education provided re:  Selene verbalized good understanding of education provided.   No spiritual or educational barriers to learning provided    Assessment     Patient tolerated treatment well; She was able to demonstrate improved ability to activate her quad mm today along with improved active flexion.  Janelle's goal for her knee replacement  was just to be able to walk without pain, and she is doing that so her interest in other exercises is not great.  She is compliant and willing to do what is asked of her, but does not liked to be over worked.   This is a 84 y.o. female referred to outpatient physical therapy and presents with a medical diagnosis of s/pLeft  TKA  and demonstrates limitations as described in the problem list. Pt prognosis is Good. Pt will continue to benefit from skilled outpatient physical therapy to address the deficits listed in the problem list, provide pt/family education and to maximize pt's level of independence in the home and community environment.     Goals as follows:  See Eval      Plan     Continue with established Plan of Care towards PT goals. 2-3x/week x 4-6 weeks.     Therapist: Jonathan Favre, PTA  2/28/2020

## 2020-03-04 ENCOUNTER — CLINICAL SUPPORT (OUTPATIENT)
Dept: REHABILITATION | Facility: HOSPITAL | Age: 85
End: 2020-03-04
Payer: MEDICARE

## 2020-03-04 DIAGNOSIS — Z96.652 STATUS POST TOTAL LEFT KNEE REPLACEMENT: Primary | ICD-10-CM

## 2020-03-04 PROCEDURE — 97110 THERAPEUTIC EXERCISES: CPT | Mod: PN,CQ

## 2020-03-04 NOTE — PROGRESS NOTES
Physical Therapy Daily Note     Name: Selene Posey  Clinic Number: [id number]  Diagnosis:   Encounter Diagnosis   Name Primary?    Status post total left knee replacement Yes     Physician: Kofi Ibarra MD  Precautions: standard   Visit #: 6 of 18  PTA Visit #: 2  Time In: 12:55 PM   Time Out: 1:55 PM     Subjective     Pt reports: No new c/o's.   Pain Scale: Selene rates pain on a scale of 0-10 to be 4-5 currently.    Objective     Selene received individual therapeutic exercises to develop strength and ROM for 35 minutes including:  Nu-Step Level 5 x 30 mins   Heel Slides 10 x 3   Quad Sets x 3 mins   Ball Squeezes x 3 mins   SLR  10 x 2  Bridges x 10  Hamstring curls - black band x 20  Heel Raises x 20  Toe Taps x 3 mins  Side Steps x 3 mins      DNP  Selene received the following manual therapy techniques: Joint mobilizations and Soft tissue Mobilization were applied to the: Left knee  for 15 minutes including:  STM to distal quad; patellar and incisional mobilization in all planes;  Joint mobilization into flexion - anterior to posterior mobilization of tibia on femur to improve flexion      AROM Left t Knee: 1 - 111 degrees; Passively able to get to Zero.      The patient received the following direct contact modalities after being cleared for contraindications:     The patient received the following supervised modalities after being cleared for contradictions:     Written Home Exercises Provided:   Quad Sets, Heel Slides; Walking   Pt demo good understanding of the education provided. Selene demonstrated good return demonstration of activities.     Education provided re:  Selene verbalized good understanding of education provided.   No spiritual or educational barriers to learning provided    Assessment     Patient did well with limited exercises; Janelle's goal for her knee replacement was just to be able to walk without pain, and  she is doing that so her interest in other exercises is not great.  She is compliant and willing to do what is asked of her, but does not liked to be over worked.   This is a 84 y.o. female referred to outpatient physical therapy and presents with a medical diagnosis of s/pLeft  TKA  and demonstrates limitations as described in the problem list. Pt prognosis is Good. Pt will continue to benefit from skilled outpatient physical therapy to address the deficits listed in the problem list, provide pt/family education and to maximize pt's level of independence in the home and community environment.     Goals as follows:  See Eval      Plan     Continue with established Plan of Care towards PT goals. 2-3x/week x 4-6 weeks.     Therapist: Jonathan Favre, PTA  3/4/2020

## 2020-03-05 ENCOUNTER — CLINICAL SUPPORT (OUTPATIENT)
Dept: REHABILITATION | Facility: HOSPITAL | Age: 85
End: 2020-03-05
Payer: MEDICARE

## 2020-03-05 DIAGNOSIS — Z96.652 STATUS POST TOTAL LEFT KNEE REPLACEMENT: Primary | ICD-10-CM

## 2020-03-05 PROCEDURE — 97110 THERAPEUTIC EXERCISES: CPT | Mod: PN,CQ

## 2020-03-05 NOTE — PROGRESS NOTES
Physical Therapy Daily Note     Name: Selene Posey  Clinic Number: [id number]  Diagnosis:   Encounter Diagnosis   Name Primary?    Status post total left knee replacement Yes     Physician: Kofi Ibarra MD  Precautions: standard   Visit #: 6 of 18  PTA Visit #: 2  Time In: 10:55 AM   Time Out: 11:55 AM     Subjective     Pt reports: No new c/o's.   Pain Scale: Selene rates pain on a scale of 0-10 to be 4 currently.    Objective     Selene received individual therapeutic exercises to develop strength and ROM for 35 minutes including:  Nu-Step Level 5 x 30 mins   Heel Slides 10 x 3   Quad Sets x 3 mins   Ball Squeezes x 3 mins   SLR  10 x 2  Bridges x 10  Hamstring curls - black band x 20  Heel Raises x 20  Toe Taps x 2 mins  Side Steps x 3 mins      DNP  Selene received the following manual therapy techniques: Joint mobilizations and Soft tissue Mobilization were applied to the: Left knee  for 15 minutes including:  STM to distal quad; patellar and incisional mobilization in all planes;  Joint mobilization into flexion - anterior to posterior mobilization of tibia on femur to improve flexion      AROM Left t Knee: 1 - 111 degrees; Passively able to get to Zero.      The patient received the following direct contact modalities after being cleared for contraindications:     The patient received the following supervised modalities after being cleared for contradictions:     Written Home Exercises Provided:   Quad Sets, Heel Slides; Walking   Pt demo good understanding of the education provided. Selene demonstrated good return demonstration of activities.     Education provided re:  Selene verbalized good understanding of education provided.   No spiritual or educational barriers to learning provided    Assessment     Patient did well with limited exercises; Janelle's goal for her knee replacement was just to be able to walk without pain, and  she is doing that so her interest in other exercises is not great.  She is compliant and willing to do what is asked of her, but does not liked to be over worked.   This is a 84 y.o. female referred to outpatient physical therapy and presents with a medical diagnosis of s/pLeft  TKA  and demonstrates limitations as described in the problem list. Pt prognosis is Good. Pt will continue to benefit from skilled outpatient physical therapy to address the deficits listed in the problem list, provide pt/family education and to maximize pt's level of independence in the home and community environment.     Goals as follows:  See Eval      Plan     Continue with established Plan of Care towards PT goals. 2-3x/week x 4-6 weeks.     Therapist: Jonathan Favre, PTA  3/5/2020

## 2020-03-11 ENCOUNTER — CLINICAL SUPPORT (OUTPATIENT)
Dept: REHABILITATION | Facility: HOSPITAL | Age: 85
End: 2020-03-11
Payer: MEDICARE

## 2020-03-11 DIAGNOSIS — Z96.652 STATUS POST TOTAL LEFT KNEE REPLACEMENT: Primary | ICD-10-CM

## 2020-03-11 PROCEDURE — 97110 THERAPEUTIC EXERCISES: CPT | Mod: PN,CQ

## 2020-03-11 NOTE — PROGRESS NOTES
Physical Therapy Daily Note     Name: Selene Posey  Clinic Number: [id number]  Diagnosis:   Encounter Diagnosis   Name Primary?    Status post total left knee replacement Yes     Physician: Kofi Ibarra MD  Precautions: standard   Visit #: 7 of 18  PTA Visit #: 3  Time In: 1:00 PM   Time Out: 2:10 PM     Subjective     Pt reports: No new c/o's.   Pain Scale: Selene rates pain on a scale of 0-10 to be 4 currently.    Objective     Selene received individual therapeutic exercises to develop strength and ROM for 35 minutes including:  Nu-Step Level 5 x 35 mins   Heel Slides 10 x 3   Quad Sets x 3 mins   Ball Squeezes x 3 mins   SLR  10 x 2  Bridges x 10  Hamstring curls - black band x 20  Heel Raises x 20  Toe Taps x 2 mins  Side Steps x 3 mins      DNP  Selene received the following manual therapy techniques: Joint mobilizations and Soft tissue Mobilization were applied to the: Left knee  for 15 minutes including:  STM to distal quad; patellar and incisional mobilization in all planes;  Joint mobilization into flexion - anterior to posterior mobilization of tibia on femur to improve flexion      AROM Left t Knee: 1 - 110 degrees; Passively able to get to Zero.      The patient received the following direct contact modalities after being cleared for contraindications:     The patient received the following supervised modalities after being cleared for contradictions:     Written Home Exercises Provided:   Quad Sets, Heel Slides; Walking   Pt demo good understanding of the education provided. Selene demonstrated good return demonstration of activities.     Education provided re:  Selene verbalized good understanding of education provided.   No spiritual or educational barriers to learning provided    Assessment     Patient did well with limited exercises; Janelle's goal for her knee replacement was just to be able to walk without pain, and she  is doing that so her interest in other exercises is not great.  She is compliant and willing to do what is asked of her, but does not liked to be over worked.   This is a 84 y.o. female referred to outpatient physical therapy and presents with a medical diagnosis of s/pLeft  TKA  and demonstrates limitations as described in the problem list. Pt prognosis is Good. Pt will continue to benefit from skilled outpatient physical therapy to address the deficits listed in the problem list, provide pt/family education and to maximize pt's level of independence in the home and community environment.     Goals as follows:  See Eval      Plan     Continue with established Plan of Care towards PT goals. 2-3x/week x 4-6 weeks.     Therapist: Jonathan Favre, PTA  3/11/2020

## 2020-03-16 ENCOUNTER — CLINICAL SUPPORT (OUTPATIENT)
Dept: REHABILITATION | Facility: HOSPITAL | Age: 85
End: 2020-03-16
Payer: MEDICARE

## 2020-03-16 DIAGNOSIS — Z96.652 STATUS POST TOTAL LEFT KNEE REPLACEMENT: Primary | ICD-10-CM

## 2020-03-16 PROCEDURE — 97110 THERAPEUTIC EXERCISES: CPT | Mod: PN

## 2020-03-17 NOTE — PROGRESS NOTES
"                                                    Physical Therapy Daily Note     Name: Selene Posey  Clinic Number: [id number]  Diagnosis:   Encounter Diagnosis   Name Primary?    Status post total left knee replacement Yes     Physician: Kofi Ibarra MD  Precautions: standard   Visit #: 8 of 18  PTA Visit #: 3  Time In: 1:00 PM   Time Out: 2:10 PM     Subjective     Pt reports: No new c/o's. "You just don't know how much this therapy has helped me, I can walk so much better now"   Pain Scale: Selene rates pain on a scale of 0-10 to be 3 currently.    Objective     Selene received individual therapeutic exercises to develop strength and ROM for 35 minutes including:  Nu-Step Level 5 x 35 mins   Heel Slides 10 x 3   Quad Sets x 3 mins   Ball Squeezes x 3 mins   SLR  10 x 2  Bridges x 10  Hamstring curls - black band x 20  Heel Raises x 20  Toe Taps x 2 mins  Side Steps x 3 mins      DNP  Selene received the following manual therapy techniques: Joint mobilizations and Soft tissue Mobilization were applied to the: Left knee  for 15 minutes including:  STM to distal quad; patellar and incisional mobilization in all planes;  Joint mobilization into flexion - anterior to posterior mobilization of tibia on femur to improve flexion      AROM Left t Knee: 1 - 115 degrees; Passively able to get to Zero.      The patient received the following direct contact modalities after being cleared for contraindications:     The patient received the following supervised modalities after being cleared for contradictions:     Written Home Exercises Provided:   Quad Sets, Heel Slides; Walking   Pt demo good understanding of the education provided. Selene demonstrated good return demonstration of activities.     Education provided re:  Selene verbalized good understanding of education provided.   No spiritual or educational barriers to learning provided    Assessment     Patient did well with limited exercises; " Janelle's goal for her knee replacement was just to be able to walk without pain, and she is doing that so her interest in other exercises is not great.  She is compliant and willing to do what is asked of her, but does not liked to be over worked.   This is a 84 y.o. female referred to outpatient physical therapy and presents with a medical diagnosis of s/pLeft  TKA  and demonstrates limitations as described in the problem list. Pt prognosis is Good. Pt will continue to benefit from skilled outpatient physical therapy to address the deficits listed in the problem list, provide pt/family education and to maximize pt's level of independence in the home and community environment.     Goals as follows:  See Eval      Plan     Continue with established Plan of Care towards PT goals. 2-3x/week x 4-6 weeks.     Therapist: Lori Piper, PT  3/16/2020

## 2020-03-18 ENCOUNTER — CLINICAL SUPPORT (OUTPATIENT)
Dept: REHABILITATION | Facility: HOSPITAL | Age: 85
End: 2020-03-18
Payer: MEDICARE

## 2020-03-18 DIAGNOSIS — Z96.652 STATUS POST TOTAL LEFT KNEE REPLACEMENT: Primary | ICD-10-CM

## 2020-03-18 PROCEDURE — 97110 THERAPEUTIC EXERCISES: CPT | Mod: PN,CQ

## 2020-03-18 NOTE — PROGRESS NOTES
Physical Therapy Daily Note     Name: Selene Posey  Clinic Number: [id number]  Diagnosis:   Encounter Diagnosis   Name Primary?    Status post total left knee replacement Yes     Physician: Kofi Ibarra MD  Precautions: standard   Visit #: 9 of 18  PTA Visit #: 1  Time In: 1:50 PM   Time Out: 3:00 PM     Subjective     Pt reports: My pain is intermittent; This morning I could barely get moving, but now it is ok.    Pain Scale: Selene rates pain on a scale of 0-10 to be 3 currently.    Objective     Selene received individual therapeutic exercises to develop strength and ROM for 35 minutes including:  Nu-Step Level 5 x 35 mins   Heel Slides 10 x 3   Quad Sets x 3 mins   Ball Squeezes x 3 mins   SLR  10 x 2  Bridges x 10  Hamstring curls - black band x 20  Heel Raises x 20  Toe Taps x 2 mins  Side Steps x 3 mins      DNP  Selene received the following manual therapy techniques: Joint mobilizations and Soft tissue Mobilization were applied to the: Left knee  for 15 minutes including:  STM to distal quad; patellar and incisional mobilization in all planes;  Joint mobilization into flexion - anterior to posterior mobilization of tibia on femur to improve flexion      AROM Left t Knee: 1 - 115 degrees; Passively able to get to Zero.      The patient received the following direct contact modalities after being cleared for contraindications:     The patient received the following supervised modalities after being cleared for contradictions:     Written Home Exercises Provided:   Quad Sets, Heel Slides; Walking   Pt demo good understanding of the education provided. Selene demonstrated good return demonstration of activities.     Education provided re:  Selene verbalized good understanding of education provided.   No spiritual or educational barriers to learning provided    Assessment     Patient did well with limited exercises; Janelle's goal for  her knee replacement was just to be able to walk without pain, and she is doing that so her interest in other exercises is not great.  She is compliant and willing to do what is asked of her, but does not liked to be over worked.   This is a 84 y.o. female referred to outpatient physical therapy and presents with a medical diagnosis of s/pLeft  TKA  and demonstrates limitations as described in the problem list. Pt prognosis is Good. Pt will continue to benefit from skilled outpatient physical therapy to address the deficits listed in the problem list, provide pt/family education and to maximize pt's level of independence in the home and community environment.     Goals as follows:  See Eval      Plan     Continue with established Plan of Care towards PT goals. 2-3x/week x 4-6 weeks.     Therapist: Jonathan Favre, PTA  3/18/2020

## 2020-12-21 DIAGNOSIS — Z12.31 ENCOUNTER FOR SCREENING MAMMOGRAM FOR MALIGNANT NEOPLASM OF BREAST: Primary | ICD-10-CM

## 2021-01-13 ENCOUNTER — HOSPITAL ENCOUNTER (OUTPATIENT)
Dept: RADIOLOGY | Facility: HOSPITAL | Age: 86
Discharge: HOME | End: 2021-01-13
Attending: NURSE PRACTITIONER
Payer: MEDICARE

## 2021-01-13 VITALS — BODY MASS INDEX: 23.32 KG/M2 | WEIGHT: 140 LBS | HEIGHT: 65 IN

## 2021-01-13 DIAGNOSIS — Z12.31 ENCOUNTER FOR SCREENING MAMMOGRAM FOR MALIGNANT NEOPLASM OF BREAST: ICD-10-CM

## 2021-01-13 PROCEDURE — 77067 SCR MAMMO BI INCL CAD: CPT | Mod: TC,PO

## 2021-01-22 ENCOUNTER — PATIENT MESSAGE (OUTPATIENT)
Dept: ADMINISTRATIVE | Facility: OTHER | Age: 86
End: 2021-01-22

## 2021-02-02 ENCOUNTER — OFFICE VISIT (OUTPATIENT)
Dept: PULMONOLOGY | Facility: CLINIC | Age: 86
End: 2021-02-02
Payer: MEDICARE

## 2021-02-02 VITALS
HEIGHT: 65 IN | SYSTOLIC BLOOD PRESSURE: 140 MMHG | TEMPERATURE: 97 F | BODY MASS INDEX: 24.66 KG/M2 | DIASTOLIC BLOOD PRESSURE: 80 MMHG | OXYGEN SATURATION: 98 % | HEART RATE: 77 BPM | WEIGHT: 148 LBS

## 2021-02-02 DIAGNOSIS — R06.00 DYSPNEA, UNSPECIFIED TYPE: ICD-10-CM

## 2021-02-02 DIAGNOSIS — R05.9 COUGH: Primary | ICD-10-CM

## 2021-02-02 PROCEDURE — 99214 PR OFFICE/OUTPT VISIT, EST, LEVL IV, 30-39 MIN: ICD-10-PCS | Mod: S$GLB,,, | Performed by: NURSE PRACTITIONER

## 2021-02-02 PROCEDURE — 99214 OFFICE O/P EST MOD 30 MIN: CPT | Mod: S$GLB,,, | Performed by: NURSE PRACTITIONER

## 2021-02-02 RX ORDER — IPRATROPIUM BROMIDE 42 UG/1
SPRAY, METERED NASAL
COMMUNITY
Start: 2021-01-28 | End: 2022-03-23

## 2021-02-04 ENCOUNTER — TELEPHONE (OUTPATIENT)
Dept: PULMONOLOGY | Facility: CLINIC | Age: 86
End: 2021-02-04

## 2021-02-12 ENCOUNTER — IMMUNIZATION (OUTPATIENT)
Dept: PRIMARY CARE CLINIC | Facility: CLINIC | Age: 86
End: 2021-02-12
Payer: MEDICARE

## 2021-02-12 DIAGNOSIS — Z23 NEED FOR VACCINATION: Primary | ICD-10-CM

## 2021-02-12 PROCEDURE — 91300 COVID-19, MRNA, LNP-S, PF, 30 MCG/0.3 ML DOSE VACCINE: CPT | Mod: S$GLB,,, | Performed by: FAMILY MEDICINE

## 2021-02-12 PROCEDURE — 91300 COVID-19, MRNA, LNP-S, PF, 30 MCG/0.3 ML DOSE VACCINE: ICD-10-PCS | Mod: S$GLB,,, | Performed by: FAMILY MEDICINE

## 2021-02-12 PROCEDURE — 0001A COVID-19, MRNA, LNP-S, PF, 30 MCG/0.3 ML DOSE VACCINE: CPT | Mod: S$GLB,,, | Performed by: FAMILY MEDICINE

## 2021-02-12 PROCEDURE — 0001A COVID-19, MRNA, LNP-S, PF, 30 MCG/0.3 ML DOSE VACCINE: ICD-10-PCS | Mod: S$GLB,,, | Performed by: FAMILY MEDICINE

## 2021-03-05 ENCOUNTER — IMMUNIZATION (OUTPATIENT)
Dept: PRIMARY CARE CLINIC | Facility: CLINIC | Age: 86
End: 2021-03-05
Payer: MEDICARE

## 2021-03-05 DIAGNOSIS — Z23 NEED FOR VACCINATION: Primary | ICD-10-CM

## 2021-03-05 PROCEDURE — 91300 COVID-19, MRNA, LNP-S, PF, 30 MCG/0.3 ML DOSE VACCINE: ICD-10-PCS | Mod: S$GLB,,, | Performed by: FAMILY MEDICINE

## 2021-03-05 PROCEDURE — 0002A COVID-19, MRNA, LNP-S, PF, 30 MCG/0.3 ML DOSE VACCINE: CPT | Mod: CV19,S$GLB,, | Performed by: FAMILY MEDICINE

## 2021-03-05 PROCEDURE — 91300 COVID-19, MRNA, LNP-S, PF, 30 MCG/0.3 ML DOSE VACCINE: CPT | Mod: S$GLB,,, | Performed by: FAMILY MEDICINE

## 2021-03-05 PROCEDURE — 0002A COVID-19, MRNA, LNP-S, PF, 30 MCG/0.3 ML DOSE VACCINE: ICD-10-PCS | Mod: CV19,S$GLB,, | Performed by: FAMILY MEDICINE

## 2021-03-08 ENCOUNTER — HOSPITAL ENCOUNTER (OUTPATIENT)
Dept: RADIOLOGY | Facility: HOSPITAL | Age: 86
Discharge: HOME | End: 2021-03-08
Attending: NURSE PRACTITIONER
Payer: MEDICARE

## 2021-03-08 ENCOUNTER — TELEPHONE (OUTPATIENT)
Dept: PULMONOLOGY | Facility: CLINIC | Age: 86
End: 2021-03-08

## 2021-03-08 ENCOUNTER — HOSPITAL ENCOUNTER (OUTPATIENT)
Dept: PULMONOLOGY | Facility: HOSPITAL | Age: 86
Discharge: HOME | End: 2021-03-08
Attending: NURSE PRACTITIONER
Payer: MEDICARE

## 2021-03-08 VITALS — HEIGHT: 65 IN | WEIGHT: 148 LBS | BODY MASS INDEX: 24.66 KG/M2

## 2021-03-08 DIAGNOSIS — R05.9 COUGH: ICD-10-CM

## 2021-03-08 DIAGNOSIS — R06.00 DYSPNEA, UNSPECIFIED TYPE: ICD-10-CM

## 2021-03-08 PROCEDURE — 25500020 PHARM REV CODE 255: Performed by: NURSE PRACTITIONER

## 2021-03-08 PROCEDURE — 71260 CT THORAX DX C+: CPT | Mod: TC

## 2021-03-08 PROCEDURE — 94729 DIFFUSING CAPACITY: CPT

## 2021-03-08 PROCEDURE — 94618 PULMONARY STRESS TESTING: CPT

## 2021-03-08 PROCEDURE — 94010 BREATHING CAPACITY TEST: CPT

## 2021-03-08 PROCEDURE — 94727 GAS DIL/WSHOT DETER LNG VOL: CPT

## 2021-03-08 RX ADMIN — IOHEXOL 100 ML: 350 INJECTION, SOLUTION INTRAVENOUS at 12:03

## 2021-03-10 ENCOUNTER — OFFICE VISIT (OUTPATIENT)
Dept: PULMONOLOGY | Facility: CLINIC | Age: 86
End: 2021-03-10
Payer: MEDICARE

## 2021-03-10 VITALS
TEMPERATURE: 98 F | SYSTOLIC BLOOD PRESSURE: 159 MMHG | HEART RATE: 76 BPM | OXYGEN SATURATION: 98 % | HEIGHT: 65 IN | DIASTOLIC BLOOD PRESSURE: 57 MMHG | BODY MASS INDEX: 23.99 KG/M2 | WEIGHT: 144 LBS

## 2021-03-10 DIAGNOSIS — K21.9 GASTROESOPHAGEAL REFLUX DISEASE WITHOUT ESOPHAGITIS: ICD-10-CM

## 2021-03-10 DIAGNOSIS — J47.9 BRONCHIECTASIS WITHOUT COMPLICATION: Primary | ICD-10-CM

## 2021-03-10 PROCEDURE — 99214 PR OFFICE/OUTPT VISIT, EST, LEVL IV, 30-39 MIN: ICD-10-PCS | Mod: S$GLB,,, | Performed by: NURSE PRACTITIONER

## 2021-03-10 PROCEDURE — 99214 OFFICE O/P EST MOD 30 MIN: CPT | Mod: S$GLB,,, | Performed by: NURSE PRACTITIONER

## 2021-03-10 RX ORDER — OMEPRAZOLE 40 MG/1
40 CAPSULE, DELAYED RELEASE ORAL
COMMUNITY
Start: 2021-03-03

## 2021-03-11 ENCOUNTER — TELEPHONE (OUTPATIENT)
Dept: PULMONOLOGY | Facility: CLINIC | Age: 86
End: 2021-03-11

## 2021-09-10 ENCOUNTER — OFFICE VISIT (OUTPATIENT)
Dept: GASTROENTEROLOGY | Facility: CLINIC | Age: 86
End: 2021-09-10
Payer: MEDICARE

## 2021-09-10 VITALS
WEIGHT: 143.75 LBS | DIASTOLIC BLOOD PRESSURE: 66 MMHG | SYSTOLIC BLOOD PRESSURE: 140 MMHG | HEIGHT: 60 IN | HEART RATE: 58 BPM | BODY MASS INDEX: 28.22 KG/M2

## 2021-09-10 DIAGNOSIS — R14.3 INTESTINAL GAS EXCRETION: ICD-10-CM

## 2021-09-10 DIAGNOSIS — K21.9 GASTROESOPHAGEAL REFLUX DISEASE WITHOUT ESOPHAGITIS: Primary | ICD-10-CM

## 2021-09-10 PROCEDURE — 99999 PR PBB SHADOW E&M-EST. PATIENT-LVL III: ICD-10-PCS | Mod: PBBFAC,,, | Performed by: INTERNAL MEDICINE

## 2021-09-10 PROCEDURE — 99213 OFFICE O/P EST LOW 20 MIN: CPT | Mod: PBBFAC,PN | Performed by: INTERNAL MEDICINE

## 2021-09-10 PROCEDURE — 99204 OFFICE O/P NEW MOD 45 MIN: CPT | Mod: S$PBB,,, | Performed by: INTERNAL MEDICINE

## 2021-09-10 PROCEDURE — 99204 PR OFFICE/OUTPT VISIT, NEW, LEVL IV, 45-59 MIN: ICD-10-PCS | Mod: S$PBB,,, | Performed by: INTERNAL MEDICINE

## 2021-09-10 PROCEDURE — 99999 PR PBB SHADOW E&M-EST. PATIENT-LVL III: CPT | Mod: PBBFAC,,, | Performed by: INTERNAL MEDICINE

## 2021-09-17 ENCOUNTER — OFFICE VISIT (OUTPATIENT)
Dept: PULMONOLOGY | Facility: CLINIC | Age: 86
End: 2021-09-17
Payer: MEDICARE

## 2021-09-17 VITALS
HEART RATE: 66 BPM | OXYGEN SATURATION: 97 % | BODY MASS INDEX: 28.27 KG/M2 | HEIGHT: 60 IN | WEIGHT: 144 LBS | SYSTOLIC BLOOD PRESSURE: 142 MMHG | DIASTOLIC BLOOD PRESSURE: 69 MMHG

## 2021-09-17 DIAGNOSIS — K21.9 GASTROESOPHAGEAL REFLUX DISEASE WITHOUT ESOPHAGITIS: ICD-10-CM

## 2021-09-17 DIAGNOSIS — J47.9 BRONCHIECTASIS WITHOUT COMPLICATION: Primary | ICD-10-CM

## 2021-09-17 PROCEDURE — 99213 OFFICE O/P EST LOW 20 MIN: CPT | Mod: S$GLB,,, | Performed by: NURSE PRACTITIONER

## 2021-09-17 PROCEDURE — 99213 PR OFFICE/OUTPT VISIT, EST, LEVL III, 20-29 MIN: ICD-10-PCS | Mod: S$GLB,,, | Performed by: NURSE PRACTITIONER

## 2022-03-23 ENCOUNTER — OFFICE VISIT (OUTPATIENT)
Dept: PULMONOLOGY | Facility: CLINIC | Age: 87
End: 2022-03-23
Payer: MEDICARE

## 2022-03-23 VITALS
SYSTOLIC BLOOD PRESSURE: 120 MMHG | BODY MASS INDEX: 27.93 KG/M2 | HEART RATE: 58 BPM | WEIGHT: 143 LBS | DIASTOLIC BLOOD PRESSURE: 82 MMHG | OXYGEN SATURATION: 97 %

## 2022-03-23 DIAGNOSIS — J47.9 BRONCHIECTASIS WITHOUT COMPLICATION: Primary | ICD-10-CM

## 2022-03-23 DIAGNOSIS — F41.9 ANXIETY: ICD-10-CM

## 2022-03-23 DIAGNOSIS — K21.9 GASTROESOPHAGEAL REFLUX DISEASE WITHOUT ESOPHAGITIS: ICD-10-CM

## 2022-03-23 PROCEDURE — 99214 PR OFFICE/OUTPT VISIT, EST, LEVL IV, 30-39 MIN: ICD-10-PCS | Mod: S$GLB,,, | Performed by: NURSE PRACTITIONER

## 2022-03-23 PROCEDURE — 99214 OFFICE O/P EST MOD 30 MIN: CPT | Mod: S$GLB,,, | Performed by: NURSE PRACTITIONER

## 2022-03-23 RX ORDER — ACYCLOVIR 400 MG/1
400 TABLET ORAL 2 TIMES DAILY PRN
COMMUNITY
Start: 2022-03-21

## 2022-03-23 RX ORDER — ESCITALOPRAM OXALATE 5 MG/1
5 TABLET ORAL DAILY
Qty: 30 TABLET | Refills: 6 | Status: SHIPPED | OUTPATIENT
Start: 2022-03-23 | End: 2023-05-01

## 2022-03-23 RX ORDER — VALACYCLOVIR HYDROCHLORIDE 500 MG/1
TABLET, FILM COATED ORAL
COMMUNITY
Start: 2021-10-30 | End: 2022-03-23

## 2022-03-23 RX ORDER — LEVOFLOXACIN 500 MG/1
500 TABLET, FILM COATED ORAL DAILY
COMMUNITY
Start: 2021-12-15 | End: 2022-03-23

## 2022-03-23 RX ORDER — CLONAZEPAM 0.5 MG/1
0.5 TABLET ORAL DAILY PRN
COMMUNITY
Start: 2022-01-12 | End: 2024-03-22 | Stop reason: DRUGHIGH

## 2022-03-23 RX ORDER — ATORVASTATIN CALCIUM 40 MG/1
40 TABLET, FILM COATED ORAL
COMMUNITY
Start: 2022-01-11 | End: 2022-03-23

## 2022-03-23 NOTE — PROGRESS NOTES
SUBJECTIVE:    Patient ID: Selene Posey is a 86 y.o. female.    Chief Complaint: Follow-up (Follow up 6 month )    Patient here today feeling well. She is using her Acapella at night and it helps her bring up mucous.  She has been suffering with some anxiety and is stressed with her job but she loves her job and enjoys working.  She takes Klonopin as needed.  She is taking her reflux medication and is sleeping elevated.  She did see a GI doctor and did not follow up. She does not want to have an EGD.     Past Medical History:   Diagnosis Date    Bronchiectasis     GERD (gastroesophageal reflux disease)      Past Surgical History:   Procedure Laterality Date    APPENDECTOMY      HYSTERECTOMY       Family History   Problem Relation Age of Onset    Breast cancer Mother 65        Social History:   Marital Status:   Occupation:   Alcohol History:  reports no history of alcohol use.  Tobacco History:  reports that she has never smoked. She has never used smokeless tobacco.  Drug History:  has no history on file for drug use.    Review of patient's allergies indicates:   Allergen Reactions    Codeine     Erythropoietin analogues     Pcn [penicillins]     Sulfa (sulfonamide antibiotics)        Current Outpatient Medications   Medication Sig Dispense Refill    acyclovir (ZOVIRAX) 400 MG tablet Take 400 mg by mouth 2 (two) times daily as needed.      clonazePAM (KLONOPIN) 0.5 MG tablet       levothyroxine (SYNTHROID) 88 MCG tablet       metoprolol succinate (TOPROL-XL) 25 MG 24 hr tablet       omeprazole (PRILOSEC) 20 MG capsule Take 20 mg by mouth once daily.      omeprazole (PRILOSEC) 40 MG capsule TAKE ONE CAPSULE BY MOUTH EVERY MORNING ON AN EMPTY STOMACH      pantoprazole (PROTONIX) 20 MG tablet Take 40 mg by mouth once daily.      EScitalopram oxalate (LEXAPRO) 5 MG Tab Take 1 tablet (5 mg total) by mouth once daily. 30 tablet 6     No current facility-administered  medications for this visit.       Ct chest 3/2021  IMPRESSION:     1. Mild bilateral lower lobe bronchiectasis.  2. 5 mm left midlung nodule unchanged since 2016 and presumably  benign.  3. Coronary artery calcifications.    General: Feeling Well.  Eyes: Vision is good.  ENT:  Sinus congestion at times   Heart::  palpitations.  Lungs: coughs occasionally   GI: GERD, dysphagia at times  : No dysuria, hesitancy, or nocturia.  Musculoskeletal: No joint pain or myalgias.  Skin: No lesions or rashes.  Neuro: No headaches or neuropathy.  Lymph: legs swell if she stands on them all day   Psych: anxiety   Endo: weight gain     OBJECTIVE:      /82 (BP Location: Right arm, Patient Position: Sitting)   Pulse (!) 58   Wt 64.9 kg (143 lb)   SpO2 97%   BMI 27.93 kg/m²     Physical Exam  GENERAL: Older patient in no distress.  HEENT: Pupils equal and reactive. Extraocular movements intact. Nose intact.      Pharynx moist.  NECK: Supple.   HEART: Regular rate and rhythm. No murmur or gallop auscultated.  LUNGS: clear  ABDOMEN: Bowel sounds present. Non-tender, no masses palpated.  EXTREMITIES: Normal muscle tone and joint movement, no cyanosis or clubbing.   LYMPHATICS: No adenopathy palpated, trace edema to legs   SKIN: Dry, intact, no lesions.   NEURO: Cranial nerves II-XII intact. Motor strength 5/5 bilaterally, upper and lower extremities.  PSYCH: Appropriate affect.    Assessment:       1. Bronchiectasis without complication    2. Gastroesophageal reflux disease without esophagitis    3. Anxiety          Plan:       Bronchiectasis without complication    Gastroesophageal reflux disease without esophagitis    Anxiety    Other orders  -     EScitalopram oxalate (LEXAPRO) 5 MG Tab; Take 1 tablet (5 mg total) by mouth once daily.  Dispense: 30 tablet; Refill: 6         Continue to use your acapella twice a day  Continue the Omeprazole daily   Elevate the head of your bed, no eating or drinking an hour before bed    Lexapro 5mg by mouth at night         Follow up in about 6 months (around 9/23/2022).

## 2022-03-23 NOTE — PATIENT INSTRUCTIONS
Continue to use your acapella twice a day  Continue the Omeprazole daily   Elevate the head of your bed, no eating or drinking an hour before bed   Lexapro 5mg by mouth at night

## 2022-05-15 DIAGNOSIS — U07.1 COVID-19 VIRUS DETECTED: ICD-10-CM

## 2022-05-16 ENCOUNTER — HOSPITAL ENCOUNTER (INPATIENT)
Facility: HOSPITAL | Age: 87
LOS: 3 days | Discharge: HOME | DRG: 640 | End: 2022-05-19
Attending: FAMILY MEDICINE | Admitting: FAMILY MEDICINE
Payer: MEDICARE

## 2022-05-16 DIAGNOSIS — U07.1 COVID-19: ICD-10-CM

## 2022-05-16 DIAGNOSIS — R07.9 CHEST PAIN: ICD-10-CM

## 2022-05-16 DIAGNOSIS — R53.83 FATIGUE, UNSPECIFIED TYPE: ICD-10-CM

## 2022-05-16 DIAGNOSIS — U07.1 COVID-19 VIRUS INFECTION: Primary | ICD-10-CM

## 2022-05-16 DIAGNOSIS — E87.1 HYPONATREMIA: ICD-10-CM

## 2022-05-16 DIAGNOSIS — E87.8 ELECTROLYTE DEPLETION: ICD-10-CM

## 2022-05-16 PROBLEM — E87.6 HYPOKALEMIA: Status: ACTIVE | Noted: 2022-05-16

## 2022-05-16 PROBLEM — E03.9 HYPOTHYROID: Status: ACTIVE | Noted: 2022-05-16

## 2022-05-16 LAB
ALBUMIN SERPL BCP-MCNC: 3.2 G/DL (ref 3.5–5.2)
ALP SERPL-CCNC: 57 U/L (ref 55–135)
ALT SERPL W/O P-5'-P-CCNC: 31 U/L (ref 10–44)
ANION GAP SERPL CALC-SCNC: 10 MMOL/L (ref 8–16)
ANION GAP SERPL CALC-SCNC: 11 MMOL/L (ref 8–16)
ANION GAP SERPL CALC-SCNC: 9 MMOL/L (ref 8–16)
AST SERPL-CCNC: 53 U/L (ref 10–40)
BACTERIA #/AREA URNS HPF: ABNORMAL /HPF
BASOPHILS # BLD AUTO: 0.01 K/UL (ref 0–0.2)
BASOPHILS NFR BLD: 0.2 % (ref 0–1.9)
BILIRUB SERPL-MCNC: 0.7 MG/DL (ref 0.1–1)
BNP SERPL-MCNC: 124 PG/ML (ref 0–99)
BUN SERPL-MCNC: 10 MG/DL (ref 8–23)
BUN SERPL-MCNC: 10 MG/DL (ref 8–23)
BUN SERPL-MCNC: 11 MG/DL (ref 8–23)
C DIFF GDH STL QL: NEGATIVE
C DIFF TOX A+B STL QL IA: NEGATIVE
CALCIUM SERPL-MCNC: 7.8 MG/DL (ref 8.7–10.5)
CALCIUM SERPL-MCNC: 7.9 MG/DL (ref 8.7–10.5)
CALCIUM SERPL-MCNC: 8.2 MG/DL (ref 8.7–10.5)
CHLORIDE SERPL-SCNC: 78 MMOL/L (ref 95–110)
CHLORIDE SERPL-SCNC: 81 MMOL/L (ref 95–110)
CHLORIDE SERPL-SCNC: 83 MMOL/L (ref 95–110)
CK SERPL-CCNC: 1200 U/L (ref 20–180)
CO2 SERPL-SCNC: 20 MMOL/L (ref 23–29)
CO2 SERPL-SCNC: 21 MMOL/L (ref 23–29)
CO2 SERPL-SCNC: 22 MMOL/L (ref 23–29)
CREAT SERPL-MCNC: 0.6 MG/DL (ref 0.5–1.4)
CREAT SERPL-MCNC: 0.6 MG/DL (ref 0.5–1.4)
CREAT SERPL-MCNC: 0.7 MG/DL (ref 0.5–1.4)
CRP SERPL-MCNC: 15.5 MG/L (ref 0–8.2)
DIFFERENTIAL METHOD: ABNORMAL
EOSINOPHIL # BLD AUTO: 0 K/UL (ref 0–0.5)
EOSINOPHIL NFR BLD: 0 % (ref 0–8)
ERYTHROCYTE [DISTWIDTH] IN BLOOD BY AUTOMATED COUNT: 11.7 % (ref 11.5–14.5)
EST. GFR  (AFRICAN AMERICAN): >60 ML/MIN/1.73 M^2
EST. GFR  (NON AFRICAN AMERICAN): >60 ML/MIN/1.73 M^2
FERRITIN SERPL-MCNC: 548 NG/ML (ref 20–300)
GLUCOSE SERPL-MCNC: 100 MG/DL (ref 70–110)
GLUCOSE SERPL-MCNC: 103 MG/DL (ref 70–110)
GLUCOSE SERPL-MCNC: 109 MG/DL (ref 70–110)
HCT VFR BLD AUTO: 31.9 % (ref 37–48.5)
HGB BLD-MCNC: 11.6 G/DL (ref 12–16)
IMM GRANULOCYTES # BLD AUTO: 0.02 K/UL (ref 0–0.04)
IMM GRANULOCYTES NFR BLD AUTO: 0.4 % (ref 0–0.5)
LACTATE SERPL-SCNC: 1 MMOL/L (ref 0.5–2.2)
LDH SERPL L TO P-CCNC: 239 U/L (ref 110–260)
LYMPHOCYTES # BLD AUTO: 1.2 K/UL (ref 1–4.8)
LYMPHOCYTES NFR BLD: 24.1 % (ref 18–48)
MAGNESIUM SERPL-MCNC: 1.4 MG/DL (ref 1.6–2.6)
MCH RBC QN AUTO: 28.7 PG (ref 27–31)
MCHC RBC AUTO-ENTMCNC: 36.4 G/DL (ref 32–36)
MCV RBC AUTO: 79 FL (ref 82–98)
MICROSCOPIC COMMENT: ABNORMAL
MONOCYTES # BLD AUTO: 0.8 K/UL (ref 0.3–1)
MONOCYTES NFR BLD: 16.9 % (ref 4–15)
NEUTROPHILS # BLD AUTO: 2.9 K/UL (ref 1.8–7.7)
NEUTROPHILS NFR BLD: 58.4 % (ref 38–73)
NRBC BLD-RTO: 0 /100 WBC
OB PNL STL: NEGATIVE
PLATELET # BLD AUTO: 242 K/UL (ref 150–450)
PMV BLD AUTO: 8.8 FL (ref 9.2–12.9)
POCT GLUCOSE: 121 MG/DL (ref 70–110)
POTASSIUM SERPL-SCNC: 3 MMOL/L (ref 3.5–5.1)
POTASSIUM SERPL-SCNC: 3.2 MMOL/L (ref 3.5–5.1)
POTASSIUM SERPL-SCNC: 3.2 MMOL/L (ref 3.5–5.1)
PROT SERPL-MCNC: 6 G/DL (ref 6–8.4)
RBC # BLD AUTO: 4.04 M/UL (ref 4–5.4)
RBC #/AREA URNS HPF: 6 /HPF (ref 0–4)
RV AG STL QL IA.RAPID: NEGATIVE
SODIUM SERPL-SCNC: 110 MMOL/L (ref 136–145)
SODIUM SERPL-SCNC: 111 MMOL/L (ref 136–145)
SODIUM SERPL-SCNC: 114 MMOL/L (ref 136–145)
SODIUM UR-SCNC: 83 MMOL/L (ref 20–250)
TROPONIN I SERPL DL<=0.01 NG/ML-MCNC: <0.006 NG/ML (ref 0–0.03)
TSH SERPL DL<=0.005 MIU/L-ACNC: 0.58 UIU/ML (ref 0.4–4)
WBC # BLD AUTO: 4.98 K/UL (ref 3.9–12.7)
WBC #/AREA STL HPF: ABNORMAL /[HPF]
WBC #/AREA URNS HPF: 4 /HPF (ref 0–5)

## 2022-05-16 PROCEDURE — 83615 LACTATE (LD) (LDH) ENZYME: CPT | Performed by: FAMILY MEDICINE

## 2022-05-16 PROCEDURE — 71045 XR CHEST AP PORTABLE: ICD-10-PCS | Mod: 26,,, | Performed by: RADIOLOGY

## 2022-05-16 PROCEDURE — 20000000 HC ICU ROOM

## 2022-05-16 PROCEDURE — 82728 ASSAY OF FERRITIN: CPT | Performed by: FAMILY MEDICINE

## 2022-05-16 PROCEDURE — 63600175 PHARM REV CODE 636 W HCPCS: Performed by: FAMILY MEDICINE

## 2022-05-16 PROCEDURE — 87449 NOS EACH ORGANISM AG IA: CPT | Performed by: FAMILY MEDICINE

## 2022-05-16 PROCEDURE — 63600175 PHARM REV CODE 636 W HCPCS: Performed by: INTERNAL MEDICINE

## 2022-05-16 PROCEDURE — 87425 ROTAVIRUS AG IA: CPT | Performed by: FAMILY MEDICINE

## 2022-05-16 PROCEDURE — 25000003 PHARM REV CODE 250: Performed by: FAMILY MEDICINE

## 2022-05-16 PROCEDURE — 85025 COMPLETE CBC W/AUTO DIFF WBC: CPT | Performed by: FAMILY MEDICINE

## 2022-05-16 PROCEDURE — 83930 ASSAY OF BLOOD OSMOLALITY: CPT | Performed by: FAMILY MEDICINE

## 2022-05-16 PROCEDURE — 99285 EMERGENCY DEPT VISIT HI MDM: CPT | Mod: 25

## 2022-05-16 PROCEDURE — 80048 BASIC METABOLIC PNL TOTAL CA: CPT | Mod: 91,XB | Performed by: INTERNAL MEDICINE

## 2022-05-16 PROCEDURE — 99291 CRITICAL CARE FIRST HOUR: CPT | Mod: ,,, | Performed by: FAMILY MEDICINE

## 2022-05-16 PROCEDURE — 82550 ASSAY OF CK (CPK): CPT | Performed by: FAMILY MEDICINE

## 2022-05-16 PROCEDURE — 83735 ASSAY OF MAGNESIUM: CPT | Performed by: FAMILY MEDICINE

## 2022-05-16 PROCEDURE — 82962 GLUCOSE BLOOD TEST: CPT

## 2022-05-16 PROCEDURE — 86140 C-REACTIVE PROTEIN: CPT | Performed by: FAMILY MEDICINE

## 2022-05-16 PROCEDURE — 93010 EKG 12-LEAD: ICD-10-PCS | Mod: CR,,, | Performed by: INTERNAL MEDICINE

## 2022-05-16 PROCEDURE — 25000003 PHARM REV CODE 250: Performed by: INTERNAL MEDICINE

## 2022-05-16 PROCEDURE — 83605 ASSAY OF LACTIC ACID: CPT | Performed by: FAMILY MEDICINE

## 2022-05-16 PROCEDURE — 93010 ELECTROCARDIOGRAM REPORT: CPT | Mod: CR,,, | Performed by: INTERNAL MEDICINE

## 2022-05-16 PROCEDURE — 80048 BASIC METABOLIC PNL TOTAL CA: CPT | Mod: XB | Performed by: FAMILY MEDICINE

## 2022-05-16 PROCEDURE — 82272 OCCULT BLD FECES 1-3 TESTS: CPT | Performed by: FAMILY MEDICINE

## 2022-05-16 PROCEDURE — 81000 URINALYSIS NONAUTO W/SCOPE: CPT | Performed by: FAMILY MEDICINE

## 2022-05-16 PROCEDURE — 96374 THER/PROPH/DIAG INJ IV PUSH: CPT

## 2022-05-16 PROCEDURE — 89055 LEUKOCYTE ASSESSMENT FECAL: CPT | Performed by: FAMILY MEDICINE

## 2022-05-16 PROCEDURE — 71045 X-RAY EXAM CHEST 1 VIEW: CPT | Mod: 26,,, | Performed by: RADIOLOGY

## 2022-05-16 PROCEDURE — 96361 HYDRATE IV INFUSION ADD-ON: CPT

## 2022-05-16 PROCEDURE — 71045 X-RAY EXAM CHEST 1 VIEW: CPT | Mod: TC,FY

## 2022-05-16 PROCEDURE — 80053 COMPREHEN METABOLIC PANEL: CPT | Performed by: FAMILY MEDICINE

## 2022-05-16 PROCEDURE — 83880 ASSAY OF NATRIURETIC PEPTIDE: CPT | Performed by: FAMILY MEDICINE

## 2022-05-16 PROCEDURE — 84300 ASSAY OF URINE SODIUM: CPT | Performed by: FAMILY MEDICINE

## 2022-05-16 PROCEDURE — 84443 ASSAY THYROID STIM HORMONE: CPT | Performed by: FAMILY MEDICINE

## 2022-05-16 PROCEDURE — 83935 ASSAY OF URINE OSMOLALITY: CPT | Performed by: FAMILY MEDICINE

## 2022-05-16 PROCEDURE — 84484 ASSAY OF TROPONIN QUANT: CPT | Performed by: FAMILY MEDICINE

## 2022-05-16 PROCEDURE — 93005 ELECTROCARDIOGRAM TRACING: CPT

## 2022-05-16 PROCEDURE — 36415 COLL VENOUS BLD VENIPUNCTURE: CPT | Performed by: FAMILY MEDICINE

## 2022-05-16 PROCEDURE — 27000207 HC ISOLATION

## 2022-05-16 PROCEDURE — 99291 PR CRITICAL CARE, E/M 30-74 MINUTES: ICD-10-PCS | Mod: ,,, | Performed by: FAMILY MEDICINE

## 2022-05-16 RX ORDER — IBUPROFEN 200 MG
16 TABLET ORAL
Status: DISCONTINUED | OUTPATIENT
Start: 2022-05-16 | End: 2022-05-19 | Stop reason: HOSPADM

## 2022-05-16 RX ORDER — LANOLIN ALCOHOL/MO/W.PET/CERES
800 CREAM (GRAM) TOPICAL
Status: DISCONTINUED | OUTPATIENT
Start: 2022-05-16 | End: 2022-05-19 | Stop reason: HOSPADM

## 2022-05-16 RX ORDER — SODIUM,POTASSIUM PHOSPHATES 280-250MG
2 POWDER IN PACKET (EA) ORAL
Status: DISCONTINUED | OUTPATIENT
Start: 2022-05-16 | End: 2022-05-19 | Stop reason: HOSPADM

## 2022-05-16 RX ORDER — SODIUM CHLORIDE 0.9 % (FLUSH) 0.9 %
10 SYRINGE (ML) INJECTION
Status: DISCONTINUED | OUTPATIENT
Start: 2022-05-16 | End: 2022-05-19 | Stop reason: HOSPADM

## 2022-05-16 RX ORDER — ESCITALOPRAM OXALATE 5 MG/1
5 TABLET ORAL DAILY
Status: DISCONTINUED | OUTPATIENT
Start: 2022-05-17 | End: 2022-05-19 | Stop reason: HOSPADM

## 2022-05-16 RX ORDER — IBUPROFEN 200 MG
24 TABLET ORAL
Status: DISCONTINUED | OUTPATIENT
Start: 2022-05-16 | End: 2022-05-19 | Stop reason: HOSPADM

## 2022-05-16 RX ORDER — ENOXAPARIN SODIUM 100 MG/ML
40 INJECTION SUBCUTANEOUS EVERY 24 HOURS
Status: DISCONTINUED | OUTPATIENT
Start: 2022-05-16 | End: 2022-05-16

## 2022-05-16 RX ORDER — ONDANSETRON 2 MG/ML
4 INJECTION INTRAMUSCULAR; INTRAVENOUS
Status: COMPLETED | OUTPATIENT
Start: 2022-05-16 | End: 2022-05-16

## 2022-05-16 RX ORDER — ACETAMINOPHEN 325 MG/1
650 TABLET ORAL EVERY 4 HOURS PRN
Status: DISCONTINUED | OUTPATIENT
Start: 2022-05-16 | End: 2022-05-19 | Stop reason: HOSPADM

## 2022-05-16 RX ORDER — GLUCAGON 1 MG
1 KIT INJECTION
Status: DISCONTINUED | OUTPATIENT
Start: 2022-05-16 | End: 2022-05-19 | Stop reason: HOSPADM

## 2022-05-16 RX ORDER — ENOXAPARIN SODIUM 100 MG/ML
30 INJECTION SUBCUTANEOUS EVERY 24 HOURS
Status: DISCONTINUED | OUTPATIENT
Start: 2022-05-17 | End: 2022-05-19 | Stop reason: HOSPADM

## 2022-05-16 RX ORDER — PROMETHAZINE HYDROCHLORIDE 12.5 MG/1
25 TABLET ORAL EVERY 6 HOURS PRN
Status: DISCONTINUED | OUTPATIENT
Start: 2022-05-16 | End: 2022-05-19 | Stop reason: HOSPADM

## 2022-05-16 RX ORDER — SODIUM CHLORIDE 9 MG/ML
1000 INJECTION, SOLUTION INTRAVENOUS
Status: COMPLETED | OUTPATIENT
Start: 2022-05-16 | End: 2022-05-16

## 2022-05-16 RX ORDER — NALOXONE HCL 0.4 MG/ML
0.02 VIAL (ML) INJECTION
Status: DISCONTINUED | OUTPATIENT
Start: 2022-05-16 | End: 2022-05-19 | Stop reason: HOSPADM

## 2022-05-16 RX ORDER — POTASSIUM CHLORIDE 7.45 MG/ML
10 INJECTION INTRAVENOUS EVERY 4 HOURS
Status: DISCONTINUED | OUTPATIENT
Start: 2022-05-16 | End: 2022-05-17

## 2022-05-16 RX ORDER — ACETAMINOPHEN 325 MG/1
650 TABLET ORAL EVERY 8 HOURS PRN
Status: DISCONTINUED | OUTPATIENT
Start: 2022-05-16 | End: 2022-05-19 | Stop reason: HOSPADM

## 2022-05-16 RX ORDER — POTASSIUM CHLORIDE 29.8 MG/ML
40 INJECTION INTRAVENOUS ONCE
Status: DISCONTINUED | OUTPATIENT
Start: 2022-05-16 | End: 2022-05-16

## 2022-05-16 RX ORDER — ONDANSETRON 2 MG/ML
4 INJECTION INTRAMUSCULAR; INTRAVENOUS EVERY 8 HOURS PRN
Status: DISCONTINUED | OUTPATIENT
Start: 2022-05-16 | End: 2022-05-19 | Stop reason: HOSPADM

## 2022-05-16 RX ORDER — LEVOTHYROXINE SODIUM 88 UG/1
88 TABLET ORAL
Status: DISCONTINUED | OUTPATIENT
Start: 2022-05-17 | End: 2022-05-19 | Stop reason: HOSPADM

## 2022-05-16 RX ORDER — SODIUM CHLORIDE 0.9 % (FLUSH) 0.9 %
10 SYRINGE (ML) INJECTION EVERY 8 HOURS PRN
Status: DISCONTINUED | OUTPATIENT
Start: 2022-05-16 | End: 2022-05-19 | Stop reason: HOSPADM

## 2022-05-16 RX ORDER — FAMOTIDINE 10 MG/ML
20 INJECTION INTRAVENOUS 2 TIMES DAILY
Status: DISCONTINUED | OUTPATIENT
Start: 2022-05-16 | End: 2022-05-19 | Stop reason: HOSPADM

## 2022-05-16 RX ORDER — HEPARIN SODIUM 5000 [USP'U]/ML
5000 INJECTION, SOLUTION INTRAVENOUS; SUBCUTANEOUS EVERY 8 HOURS
Status: DISCONTINUED | OUTPATIENT
Start: 2022-05-16 | End: 2022-05-16

## 2022-05-16 RX ORDER — ASPIRIN 81 MG/1
81 TABLET ORAL DAILY
Status: DISCONTINUED | OUTPATIENT
Start: 2022-05-17 | End: 2022-05-19 | Stop reason: HOSPADM

## 2022-05-16 RX ORDER — SODIUM CHLORIDE 9 MG/ML
INJECTION, SOLUTION INTRAVENOUS CONTINUOUS
Status: DISCONTINUED | OUTPATIENT
Start: 2022-05-16 | End: 2022-05-16

## 2022-05-16 RX ORDER — POTASSIUM CHLORIDE 20 MEQ/1
40 TABLET, EXTENDED RELEASE ORAL ONCE
Status: COMPLETED | OUTPATIENT
Start: 2022-05-16 | End: 2022-05-16

## 2022-05-16 RX ORDER — PANTOPRAZOLE SODIUM 40 MG/10ML
40 INJECTION, POWDER, LYOPHILIZED, FOR SOLUTION INTRAVENOUS DAILY
Status: DISCONTINUED | OUTPATIENT
Start: 2022-05-17 | End: 2022-05-19 | Stop reason: HOSPADM

## 2022-05-16 RX ORDER — SODIUM CHLORIDE AND POTASSIUM CHLORIDE 150; 900 MG/100ML; MG/100ML
INJECTION, SOLUTION INTRAVENOUS CONTINUOUS
Status: DISCONTINUED | OUTPATIENT
Start: 2022-05-16 | End: 2022-05-19 | Stop reason: HOSPADM

## 2022-05-16 RX ADMIN — SODIUM CHLORIDE 1000 ML: 0.9 INJECTION, SOLUTION INTRAVENOUS at 03:05

## 2022-05-16 RX ADMIN — SODIUM CHLORIDE: 0.9 INJECTION, SOLUTION INTRAVENOUS at 08:05

## 2022-05-16 RX ADMIN — ONDANSETRON 4 MG: 2 INJECTION INTRAMUSCULAR; INTRAVENOUS at 02:05

## 2022-05-16 RX ADMIN — SODIUM CHLORIDE 1000 ML: 0.9 INJECTION, SOLUTION INTRAVENOUS at 01:05

## 2022-05-16 RX ADMIN — POTASSIUM CHLORIDE 10 MEQ: 7.46 INJECTION, SOLUTION INTRAVENOUS at 10:05

## 2022-05-16 RX ADMIN — HYDROCORTISONE SODIUM SUCCINATE 125 MG: 250 INJECTION, POWDER, FOR SOLUTION INTRAMUSCULAR; INTRAVENOUS at 06:05

## 2022-05-16 RX ADMIN — FAMOTIDINE 20 MG: 10 INJECTION INTRAVENOUS at 10:05

## 2022-05-16 RX ADMIN — POTASSIUM CHLORIDE AND SODIUM CHLORIDE: 900; 150 INJECTION, SOLUTION INTRAVENOUS at 11:05

## 2022-05-16 RX ADMIN — PROMETHAZINE HYDROCHLORIDE 12.5 MG: 25 INJECTION INTRAMUSCULAR; INTRAVENOUS at 09:05

## 2022-05-16 RX ADMIN — POTASSIUM CHLORIDE 40 MEQ: 1500 TABLET, EXTENDED RELEASE ORAL at 09:05

## 2022-05-16 NOTE — Clinical Note
Is this patient a high probability for COVID-19?: Yes   Diagnosis: Electrolyte depletion [695492]   Future Attending Provider: RADHA ASENCIO [[id number]]   Admitting Provider:: DILLON SERRATO [[id number]]

## 2022-05-16 NOTE — ASSESSMENT & PLAN NOTE
Severe hyponatremia, Na+ 111 on admission  Possibly 2/2 hypovolemia from nausea/vomiting associated with covid-19 virus infection though worsened with IVFs - now considering SIADH vs adrenal insufficiency or other  Will order urine studies though fluid bolus given prior to collection  BMP q2h if giving 3% saline infusion or bolus  Evaluate for fluid overload   Will admit to ICU and await recommendations from critical care team regarding 3% saline especially given hypokalemia  Attempting to get PICC placement now   Check TSH, magnesium  Stress dose steroids ordered

## 2022-05-16 NOTE — ED NOTES
Patient assisted to bedside commode. Patient did not have any urinary output but small amount of loose stool noted. V/S taken. WNL. Patient continues very anxious stating she feels as if she is going to pass out. Patient cleaned and put back in bed.

## 2022-05-16 NOTE — ED NOTES
Patient assisted to bedside commode. Continues with generalized weakness. UA collected by [facility]. V/S WNL

## 2022-05-16 NOTE — SUBJECTIVE & OBJECTIVE
Past Medical History:   Diagnosis Date    Bronchiectasis     CKD (chronic kidney disease)     GERD (gastroesophageal reflux disease)     High cholesterol     Hypertension     Paroxysmal atrial fibrillation     Thyroid disease        Past Surgical History:   Procedure Laterality Date    APPENDECTOMY      HYSTERECTOMY         Review of patient's allergies indicates:   Allergen Reactions    Azithromycin     Codeine     Erythropoietin analogues     Pcn [penicillins]     Sulfa (sulfonamide antibiotics)     Fenofibrate      Made patient hoarse.       No current facility-administered medications on file prior to encounter.     Current Outpatient Medications on File Prior to Encounter   Medication Sig    acyclovir (ZOVIRAX) 400 MG tablet Take 400 mg by mouth 2 (two) times daily as needed.    aspirin (ECOTRIN) 81 MG EC tablet 81 mg.    clonazePAM (KLONOPIN) 0.5 MG tablet     EScitalopram oxalate (LEXAPRO) 5 MG Tab Take 1 tablet (5 mg total) by mouth once daily.    ezetimibe (ZETIA) 10 mg tablet Take 10 mg by mouth once daily.    fluticasone propionate (FLONASE) 50 mcg/actuation nasal spray SMARTSI Spray(s) Both Nares Twice Daily PRN    ipratropium (ATROVENT) 21 mcg (0.03 %) nasal spray 2 sprays.    levothyroxine (SYNTHROID) 88 MCG tablet     meloxicam (MOBIC) 15 MG tablet Take 15 mg by mouth daily as needed.    metoprolol succinate (TOPROL-XL) 25 MG 24 hr tablet     nirmatrelvir-ritonavir 150 mg x 2- 100 mg copackaged tablets (EUA) Take 3 tablets by mouth 2 (two) times daily for 5 days. Each dose contains 2 nirmatrelvir (pink tablets) and 1 ritonavir (white tablet). Take all 3 tablets together    omeprazole (PRILOSEC) 20 MG capsule Take 20 mg by mouth once daily.    omeprazole (PRILOSEC) 40 MG capsule TAKE ONE CAPSULE BY MOUTH EVERY MORNING ON AN EMPTY STOMACH    ondansetron (ZOFRAN) 4 MG tablet Take 1 tablet (4 mg total) by mouth every 8 (eight) hours as needed for Nausea.    pantoprazole (PROTONIX) 20 MG tablet Take 40  mg by mouth once daily.    predniSONE (DELTASONE) 10 MG tablet Take 10 mg by mouth once daily.     Family History       Problem Relation (Age of Onset)    Breast cancer Mother (65)          Tobacco Use    Smoking status: Never Smoker    Smokeless tobacco: Never Used   Substance and Sexual Activity    Alcohol use: No    Drug use: Not on file    Sexual activity: Not on file     Review of Systems   Unable to perform ROS: Mental status change   Constitutional:  Positive for fatigue. Negative for fever.   HENT: Negative.     Respiratory:  Positive for cough. Negative for shortness of breath.    Cardiovascular:  Negative for chest pain.   Gastrointestinal:  Positive for diarrhea and nausea. Negative for abdominal pain and vomiting.   Endocrine: Negative.    Genitourinary:  Positive for decreased urine volume.   Musculoskeletal:  Positive for myalgias.   Skin:  Negative for color change.   Allergic/Immunologic: Negative.    Neurological:  Positive for weakness. Negative for syncope.   Psychiatric/Behavioral: Negative.     Objective:     Vital Signs (Most Recent):  Temp: 98.7 °F (37.1 °C) (05/16/22 1343)  Pulse: 64 (05/16/22 1704)  Resp: (!) 22 (05/16/22 1704)  BP: (!) 150/82 (05/16/22 1704)  SpO2: 95 % (05/16/22 1704)   Vital Signs (24h Range):  Temp:  [98.7 °F (37.1 °C)] 98.7 °F (37.1 °C)  Pulse:  [63-68] 64  Resp:  [19-22] 22  SpO2:  [94 %-98 %] 95 %  BP: (150-162)/(77-84) 150/82     Weight: 72.6 kg (160 lb)  Body mass index is 29.26 kg/m².    Physical Exam  Vitals reviewed.   Constitutional:       Appearance: She is ill-appearing.      Comments: Awake, slightly confused/slowed reactions   HENT:      Head: Normocephalic and atraumatic.      Right Ear: External ear normal.      Left Ear: External ear normal.      Nose: Nose normal.      Mouth/Throat:      Mouth: Mucous membranes are dry.   Eyes:      General: No scleral icterus.     Conjunctiva/sclera: Conjunctivae normal.   Cardiovascular:      Rate and Rhythm: Normal  rate and regular rhythm.      Heart sounds: Murmur heard.     No gallop.   Pulmonary:      Effort: Pulmonary effort is normal. No respiratory distress.      Breath sounds: Rales present. No wheezing.      Comments: Coarse upper airway noises  Abdominal:      General: There is no distension.      Palpations: Abdomen is soft.      Tenderness: There is no abdominal tenderness.   Musculoskeletal:      Right lower leg: No edema.      Left lower leg: No edema.   Skin:     General: Skin is dry.      Coloration: Skin is not jaundiced.      Findings: No bruising.   Neurological:      Comments: No obvious focal deficit but responses are overall slowed   Psychiatric:      Comments: Confused, slowed reactions but awake and responding           Significant Labs: All pertinent labs within the past 24 hours have been reviewed.  CBC:   Recent Labs   Lab 05/16/22  1400   WBC 4.98   HGB 11.6*   HCT 31.9*        CMP:   Recent Labs   Lab 05/16/22  1400 05/16/22  1531   * 110*   K 3.2* 3.0*   CL 78* 81*   CO2 22* 20*    100   BUN 11 10   CREATININE 0.7 0.6   CALCIUM 7.9* 7.8*   PROT 6.0  --    ALBUMIN 3.2*  --    BILITOT 0.7  --    ALKPHOS 57  --    AST 53*  --    ALT 31  --    ANIONGAP 11 9   EGFRNONAA >60.0 >60.0     Lactic Acid:   Recent Labs   Lab 05/16/22  1503   LACTATE 1.0     Magnesium: No results for input(s): MG in the last 48 hours.  Urine Studies:   Recent Labs   Lab 05/16/22  1701   RBCUA 6*   WBCUA 4   BACTERIA Rare       Significant Imaging: I have reviewed all pertinent imaging results/findings within the past 24 hours.  X-Ray Chest AP Portable   Final Result   Abnormal      Findings consistent with mild congestive heart failure.  Close interval follow-up is recommended.      This report was flagged in Epic as abnormal.         Electronically signed by: Carlos Manuel Burnett   Date:    05/16/2022   Time:    14:18

## 2022-05-16 NOTE — HPI
Patient is an 86-year-old female with past medical history of CKD, GERD, hyperlipidemia, hypertension, paroxysmal AFib, thyroid disease who presents to the ER after being diagnosed with COVID-19 virus infection yesterday and now with worsening diarrhea.  Patient is a poor historian but says that she is having profuse diarrhea. Does not feel short of breath but feels bad all over. Has not been able to tolerate PO intake for several days due to diarrhea. Uncertain if she has had fever but reports cough. In the ER, BNP slightly elevated at 124, troponin normal, lactate normal, K+ 3.0, Na+ 111. Prior to lab results she was given 1L NS bolus. Repeat sodium trended to 110. Hospital team called for admission.

## 2022-05-16 NOTE — ED PROVIDER NOTES
Encounter Date: 5/16/2022       History     Chief Complaint   Patient presents with    Weakness    Vomiting    Diarrhea     86-year-old female presents the ED per EMS complaining of weakness fatigue and nausea she was diagnosed here yesterday with COVID-19 she has been previously immunized has a past history of Lily act this is CKD GERD hypertension and atrial fibrillation        Review of patient's allergies indicates:   Allergen Reactions    Azithromycin     Codeine     Erythropoietin analogues     Pcn [penicillins]     Sulfa (sulfonamide antibiotics)     Fenofibrate      Made patient hoarse.     Past Medical History:   Diagnosis Date    Bronchiectasis     CKD (chronic kidney disease)     GERD (gastroesophageal reflux disease)     High cholesterol     Hypertension     Paroxysmal atrial fibrillation     Thyroid disease      Past Surgical History:   Procedure Laterality Date    APPENDECTOMY      HYSTERECTOMY       Family History   Problem Relation Age of Onset    Breast cancer Mother 65     Social History     Tobacco Use    Smoking status: Never Smoker    Smokeless tobacco: Never Used   Substance Use Topics    Alcohol use: No     Review of Systems   Constitutional: Positive for fatigue and fever.   HENT: Negative for congestion and sore throat.    Eyes: Negative for pain.   Respiratory: Negative for shortness of breath.    Cardiovascular: Negative for chest pain.   Gastrointestinal: Negative for nausea.   Genitourinary: Negative for dysuria and frequency.   Musculoskeletal: Negative for back pain.   Skin: Negative for rash.   Neurological: Negative for weakness.   Hematological: Does not bruise/bleed easily.   Psychiatric/Behavioral: Negative for agitation.       Physical Exam     Initial Vitals [05/16/22 1343]   BP Pulse Resp Temp SpO2   (!) 158/77 63 19 98.7 °F (37.1 °C) 98 %      MAP       --         Physical Exam    Nursing note and vitals reviewed.  Constitutional: She appears  well-developed. She is not diaphoretic. She appears distressed.   Patient generally appears pale weak and dehydrated   HENT:   Head: Normocephalic and atraumatic.   Right Ear: External ear normal.   Left Ear: External ear normal.   Eyes: Pupils are equal, round, and reactive to light. Right eye exhibits no discharge. Left eye exhibits no discharge.   Neck: No tracheal deviation present. No JVD present.   Cardiovascular: Exam reveals no friction rub.    No murmur heard.  Pulmonary/Chest: No stridor. No respiratory distress. She has no wheezes. She has no rales.   Abdominal: Bowel sounds are normal. She exhibits no distension.   Musculoskeletal:         General: Normal range of motion.     Neurological: She is alert.   Skin: Skin is warm. There is pallor.   Psychiatric: She has a normal mood and affect.         ED Course   Procedures  Labs Reviewed   CBC W/ AUTO DIFFERENTIAL - Abnormal; Notable for the following components:       Result Value    Hemoglobin 11.6 (*)     Hematocrit 31.9 (*)     MCV 79 (*)     MCHC 36.4 (*)     MPV 8.8 (*)     Mono % 16.9 (*)     All other components within normal limits   COMPREHENSIVE METABOLIC PANEL - Abnormal; Notable for the following components:    Sodium 111 (*)     Potassium 3.2 (*)     Chloride 78 (*)     CO2 22 (*)     Calcium 7.9 (*)     Albumin 3.2 (*)     AST 53 (*)     All other components within normal limits    Narrative:     Sodium critical result(s) called and verbal readback obtained from   Kaylyn Dinero RN ED.  by [facility] [place] 05/16/2022 14:57   B-TYPE NATRIURETIC PEPTIDE - Abnormal; Notable for the following components:     (*)     All other components within normal limits   C-REACTIVE PROTEIN - Abnormal; Notable for the following components:    CRP 15.5 (*)     All other components within normal limits   CK - Abnormal; Notable for the following components:    CPK 1200 (*)     All other components within normal limits   BASIC METABOLIC PANEL - Abnormal; Notable for  the following components:    Sodium 110 (*)     Potassium 3.0 (*)     Chloride 81 (*)     CO2 20 (*)     Calcium 7.8 (*)     All other components within normal limits    Narrative:     Sodium critical result(s) called and verbal readback obtained from   Justin Clinton RN ED.  by [facility] [place] 05/16/2022 16:05   URINALYSIS MICROSCOPIC - Abnormal; Notable for the following components:    RBC, UA 6 (*)     All other components within normal limits    Narrative:     Specimen Source->Urine   WBC, STOOL - Abnormal; Notable for the following components:    Stool WBC Occ neutrophils seen (*)     All other components within normal limits   MAGNESIUM - Abnormal; Notable for the following components:    Magnesium 1.4 (*)     All other components within normal limits   POCT GLUCOSE - Abnormal; Notable for the following components:    POCT Glucose 121 (*)     All other components within normal limits   CULTURE, STOOL   ENTEROHEMORRHAGIC E.COLI   LACTATE DEHYDROGENASE   LACTIC ACID, PLASMA   TROPONIN I   OSMOLALITY, SERUM   TSH   TSH   MAGNESIUM   OCCULT BLOOD X 1, STOOL   CALPROTECTIN, STOOL   GIARDIA/CRYPTOSPORIDIUM (EIA)   STOOL EXAM-OVA,CYSTS,PARASITES     EKG Readings: (Independently Interpreted)   Initial Reading: No STEMI. Rhythm: Normal Sinus Rhythm. Heart Rate: 63. Ectopy: No Ectopy. Conduction: Normal. ST Segments: Normal ST Segments. T Waves: Normal.     ECG Results          EKG 12-lead (Final result)  Result time 05/17/22 11:24:08    Final result by Interface, Lab In [facility] (05/17/22 11:24:08)                 Narrative:    Test Reason : U07.1,    Vent. Rate : 063 BPM     Atrial Rate : 063 BPM     P-R Int : 160 ms          QRS Dur : 094 ms      QT Int : 444 ms       P-R-T Axes : 077 023 072 degrees     QTc Int : 454 ms    Normal sinus rhythm with sinus arrhythmia  Normal ECG  No previous ECGs available  Confirmed by Eliecer Barrientos MD ([id number]) on 5/17/2022 11:24:03 AM    Referred By: DEMETRIS   SELF           Confirmed By:Eliecer Barrientos MD                             Imaging Results           X-Ray Chest AP Portable (Final result)  Result time 05/16/22 14:18:53    Final result by Carlos Manuel Burnett MD (05/16/22 14:18:53)                 Impression:      Findings consistent with mild congestive heart failure.  Close interval follow-up is recommended.    This report was flagged in Epic as abnormal.      Electronically signed by: Carlos Manuel Burnett  Date:    05/16/2022  Time:    14:18             Narrative:    EXAMINATION:  XR CHEST AP PORTABLE    CLINICAL HISTORY:  sob;    TECHNIQUE:  Single frontal view of the chest was performed.    COMPARISON:  02/21/2005.    FINDINGS:  Mild pulmonary hypoinflation.  Mild chronic changes of interstitial fibrosis.  There is a mild diffuse prominence of the pulmonary interstitium consistent with interstitial edema.  No significant pleural effusion.    Heart size is mildly enlarged.  Mild elevation right hemidiaphragm.  Calcified aortic plaque.  Trachea midline.    Bony thorax intact with demineralization.                                 Medications   0.9%  NaCl infusion (0 mLs Intravenous Stopped 5/16/22 1517)   ondansetron injection 4 mg (4 mg Intravenous Given 5/16/22 1449)   0.9%  NaCl infusion (1,000 mLs Intravenous New Bag 5/16/22 1519)   potassium chloride SA CR tablet 40 mEq (40 mEq Oral Given 5/16/22 2100)   promethazine (PHENERGAN) 12.5 mg in dextrose 5 % 50 mL IVPB (0 mg Intravenous Stopped 5/16/22 2211)   potassium chloride 10 mEq in 100 mL IVPB (10 mEq Intravenous New Bag 5/17/22 0420)   diphenoxylate-atropine 2.5-0.025 mg per tablet 2 tablet (2 tablets Oral Given 5/17/22 1404)   potassium chloride SA CR tablet 20 mEq (20 mEq Oral Given 5/18/22 1555)     Medical Decision Making:   ED Management:  Considering the patient's hyponatremia in her appearance I think this patient needs to be admitted with her COVID-19                      Clinical Impression:   Final diagnoses:  [U07.1] COVID-19  [E87.8] Electrolyte  depletion  [R53.83] Fatigue, unspecified type  [E87.1] Hyponatremia          ED Disposition Condition    Admit               Daniel Deluca MD  05/25/22 0028       Daniel Deluca MD  06/23/22 1312

## 2022-05-16 NOTE — ED NOTES
Patient assisted to bedside commode and had large diarrhea episode upon standing on floor. Unable to obtain stool specimen. Pt continues to have very frequent bouts of gas and loose stools. C/O nausea without emesis. Patient bed linens changed and replaced gown. Unable to place PICC line at this time due to room being too small and no other room to place patient. V/s remain wnl. NAD at this time.

## 2022-05-16 NOTE — PLAN OF CARE
05/16/22 1712   Discharge Assessment   Assessment Type Discharge Planning Assessment   Confirmed/corrected address, phone number and insurance Yes   Confirmed Demographics Correct on Facesheet   Source of Information family   When was your last doctors appointment?   (Unknown per son)   Does patient/caregiver understand observation status Yes   Communicated ELIZABETH with patient/caregiver Date not available/Unable to determine   Reason For Admission low potassium, has not eaten, nausea   Lives With alone   Facility Arrived From: Urgent Care   Do you expect to return to your current living situation? Yes   Do you have help at home or someone to help you manage your care at home? Yes   Who are your caregiver(s) and their phone number(s)? Gideon James [phone] or Carlos Barton [phone]   Prior to hospitilization cognitive status: Alert/Oriented   Current cognitive status: Alert/Oriented   Walking or Climbing Stairs Difficulty none   Dressing/Bathing Difficulty none   Home Accessibility wheelchair accessible   Home Layout Able to live on 1st floor   Equipment Currently Used at Home none   Readmission within 30 days? No   Patient currently being followed by outpatient case management? No   Do you currently have service(s) that help you manage your care at home? No   Do you take prescription medications? Yes   Do you have prescription coverage? Yes   Coverage [employer]   Do you have any problems affording any of your prescribed medications? No   Is the patient taking medications as prescribed? yes   Who is going to help you get home at discharge? Gideon James [phone] or Carlos Barton [phone]   How do you get to doctors appointments? car, drives self   Are you on dialysis? No   Do you take coumadin? No   Discharge Plan A Home   Discharge Plan B Home   DME Needed Upon Discharge  none   Discharge Plan discussed with: Adult children   Discharge Barriers Identified None      Assessment completed telephonically with Son Jamel James [phone] due to coivd (+). Patient lives at home alone but has family available for support. Son Jamel James [phone](Lives in [place], [place]) or Daughter Becki Barton [phone] (lives in [place], [place]). Per son, patient is very active, and still sells real estate. He denies her having any equipment at home for use. He denies any needs currently. Case Management will continue to follow for further needs.   Warm/Dry

## 2022-05-16 NOTE — PLAN OF CARE
05/16/22 1711   GAMBLE Message   Medicare Outpatient and Observation Notification regarding financial responsibility Given to patient/caregiver;Explained to patient/caregiver;Signed/date by patient/caregiver  (Verbal signature received from son due to covid)   Date GAMBLE was signed 05/16/22   Time GAMBLE was signed [id number]

## 2022-05-16 NOTE — ED NOTES
Spoke with patients daughter per patient request. Updated on patient status and plan of care including admission. Daughter v/u and request to be updated as time moves on by the floor. Provided the direct number to Medical floor for daughter to call.

## 2022-05-16 NOTE — ASSESSMENT & PLAN NOTE
K+ 3.0 on admission   Likely 2/2 GI losses - diarrhea  Will replace PO if tolerated  Check magnesium levels

## 2022-05-16 NOTE — H&P
[facility] Medicine  History & Physical    Patient Name: Selene Posey  MRN: [id number]  Patient Class: OP- Observation  Admission Date: 5/16/2022  Attending Physician: Kimberly López MD  Primary Care Provider: Robbie Thibodeaux MD         Patient information was obtained from patient and ER records.     Subjective:     Principal Problem:Hyponatremia    Chief Complaint:   Chief Complaint   Patient presents with    Weakness    Vomiting    Diarrhea        HPI: Patient is an 86-year-old female with past medical history of CKD, GERD, hyperlipidemia, hypertension, paroxysmal AFib, thyroid disease who presents to the ER after being diagnosed with COVID-19 virus infection yesterday and now with worsening diarrhea.  Patient is a poor historian but says that she is having profuse diarrhea. Does not feel short of breath but feels bad all over. Has not been able to tolerate PO intake for several days due to diarrhea. Uncertain if she has had fever but reports cough. In the ER, BNP slightly elevated at 124, troponin normal, lactate normal, K+ 3.0, Na+ 111. Prior to lab results she was given 1L NS bolus. Repeat sodium trended to 110. Hospital team called for admission.       Past Medical History:   Diagnosis Date    Bronchiectasis     CKD (chronic kidney disease)     GERD (gastroesophageal reflux disease)     High cholesterol     Hypertension     Paroxysmal atrial fibrillation     Thyroid disease        Past Surgical History:   Procedure Laterality Date    APPENDECTOMY      HYSTERECTOMY         Review of patient's allergies indicates:   Allergen Reactions    Azithromycin     Codeine     Erythropoietin analogues     Pcn [penicillins]     Sulfa (sulfonamide antibiotics)     Fenofibrate      Made patient hoarse.       No current facility-administered medications on file prior to encounter.     Current Outpatient Medications on File Prior to Encounter   Medication Sig    acyclovir  (ZOVIRAX) 400 MG tablet Take 400 mg by mouth 2 (two) times daily as needed.    aspirin (ECOTRIN) 81 MG EC tablet 81 mg.    clonazePAM (KLONOPIN) 0.5 MG tablet     EScitalopram oxalate (LEXAPRO) 5 MG Tab Take 1 tablet (5 mg total) by mouth once daily.    ezetimibe (ZETIA) 10 mg tablet Take 10 mg by mouth once daily.    fluticasone propionate (FLONASE) 50 mcg/actuation nasal spray SMARTSI Spray(s) Both Nares Twice Daily PRN    ipratropium (ATROVENT) 21 mcg (0.03 %) nasal spray 2 sprays.    levothyroxine (SYNTHROID) 88 MCG tablet     meloxicam (MOBIC) 15 MG tablet Take 15 mg by mouth daily as needed.    metoprolol succinate (TOPROL-XL) 25 MG 24 hr tablet     nirmatrelvir-ritonavir 150 mg x 2- 100 mg copackaged tablets (EUA) Take 3 tablets by mouth 2 (two) times daily for 5 days. Each dose contains 2 nirmatrelvir (pink tablets) and 1 ritonavir (white tablet). Take all 3 tablets together    omeprazole (PRILOSEC) 20 MG capsule Take 20 mg by mouth once daily.    omeprazole (PRILOSEC) 40 MG capsule TAKE ONE CAPSULE BY MOUTH EVERY MORNING ON AN EMPTY STOMACH    ondansetron (ZOFRAN) 4 MG tablet Take 1 tablet (4 mg total) by mouth every 8 (eight) hours as needed for Nausea.    pantoprazole (PROTONIX) 20 MG tablet Take 40 mg by mouth once daily.    predniSONE (DELTASONE) 10 MG tablet Take 10 mg by mouth once daily.     Family History       Problem Relation (Age of Onset)    Breast cancer Mother (65)          Tobacco Use    Smoking status: Never Smoker    Smokeless tobacco: Never Used   Substance and Sexual Activity    Alcohol use: No    Drug use: Not on file    Sexual activity: Not on file     Review of Systems   Unable to perform ROS: Mental status change   Constitutional:  Positive for fatigue. Negative for fever.   HENT: Negative.     Respiratory:  Positive for cough. Negative for shortness of breath.    Cardiovascular:  Negative for chest pain.   Gastrointestinal:  Positive for diarrhea and nausea.  Negative for abdominal pain and vomiting.   Endocrine: Negative.    Genitourinary:  Positive for decreased urine volume.   Musculoskeletal:  Positive for myalgias.   Skin:  Negative for color change.   Allergic/Immunologic: Negative.    Neurological:  Positive for weakness. Negative for syncope.   Psychiatric/Behavioral: Negative.     Objective:     Vital Signs (Most Recent):  Temp: 98.7 °F (37.1 °C) (05/16/22 1343)  Pulse: 64 (05/16/22 1704)  Resp: (!) 22 (05/16/22 1704)  BP: (!) 150/82 (05/16/22 1704)  SpO2: 95 % (05/16/22 1704)   Vital Signs (24h Range):  Temp:  [98.7 °F (37.1 °C)] 98.7 °F (37.1 °C)  Pulse:  [63-68] 64  Resp:  [19-22] 22  SpO2:  [94 %-98 %] 95 %  BP: (150-162)/(77-84) 150/82     Weight: 72.6 kg (160 lb)  Body mass index is 29.26 kg/m².    Physical Exam  Vitals reviewed.   Constitutional:       Appearance: She is ill-appearing.      Comments: Awake, slightly confused/slowed reactions   HENT:      Head: Normocephalic and atraumatic.      Right Ear: External ear normal.      Left Ear: External ear normal.      Nose: Nose normal.      Mouth/Throat:      Mouth: Mucous membranes are dry.   Eyes:      General: No scleral icterus.     Conjunctiva/sclera: Conjunctivae normal.   Cardiovascular:      Rate and Rhythm: Normal rate and regular rhythm.      Heart sounds: Murmur heard.     No gallop.   Pulmonary:      Effort: Pulmonary effort is normal. No respiratory distress.      Breath sounds: Rales present. No wheezing.      Comments: Coarse upper airway noises  Abdominal:      General: There is no distension.      Palpations: Abdomen is soft.      Tenderness: There is no abdominal tenderness.   Musculoskeletal:      Right lower leg: No edema.      Left lower leg: No edema.   Skin:     General: Skin is dry.      Coloration: Skin is not jaundiced.      Findings: No bruising.   Neurological:      Comments: No obvious focal deficit but responses are overall slowed   Psychiatric:      Comments: Confused, slowed  reactions but awake and responding           Significant Labs: All pertinent labs within the past 24 hours have been reviewed.  CBC:   Recent Labs   Lab 05/16/22  1400   WBC 4.98   HGB 11.6*   HCT 31.9*        CMP:   Recent Labs   Lab 05/16/22  1400 05/16/22  1531   * 110*   K 3.2* 3.0*   CL 78* 81*   CO2 22* 20*    100   BUN 11 10   CREATININE 0.7 0.6   CALCIUM 7.9* 7.8*   PROT 6.0  --    ALBUMIN 3.2*  --    BILITOT 0.7  --    ALKPHOS 57  --    AST 53*  --    ALT 31  --    ANIONGAP 11 9   EGFRNONAA >60.0 >60.0     Lactic Acid:   Recent Labs   Lab 05/16/22  1503   LACTATE 1.0     Magnesium: No results for input(s): MG in the last 48 hours.  Urine Studies:   Recent Labs   Lab 05/16/22  1701   RBCUA 6*   WBCUA 4   BACTERIA Rare       Significant Imaging: I have reviewed all pertinent imaging results/findings within the past 24 hours.  X-Ray Chest AP Portable   Final Result   Abnormal      Findings consistent with mild congestive heart failure.  Close interval follow-up is recommended.      This report was flagged in Epic as abnormal.         Electronically signed by: Carlos Manuel Burnett   Date:    05/16/2022   Time:    14:18            Assessment/Plan:     * Hyponatremia  Severe hyponatremia, Na+ 111 on admission  Possibly 2/2 hypovolemia from nausea/vomiting associated with covid-19 virus infection though worsened with IVFs - now considering SIADH vs adrenal insufficiency or other  Will order urine studies though fluid bolus given prior to collection  BMP q2h if giving 3% saline infusion or bolus  Evaluate for fluid overload   Will admit to ICU and await recommendations from critical care team regarding 3% saline especially given hypokalemia  Attempting to get PICC placement now   Check TSH, magnesium  Stress dose steroids ordered    Hypothyroid  On home levothyroxine 88 mcg  Recheck TSH given severity of hyponatremia  Adjust dose if needed      Hypokalemia  K+ 3.0 on admission   Likely 2/2 GI losses -  diarrhea  Will replace PO if tolerated  Check magnesium levels    COVID-19 virus infection  Diagnosed 1 day prior to admission  Meets criteria for Remdesivir, already given Paxlovid. Reordered but discuss with eICU MD for further recommendations i.e. use of Remdesivir instead  Patient is identified as High risk for severe complications of COVID 19 based on COVID risk score of 3   Initiate standard COVID protocols; COVID-19 testing ,Infection Control notification  and isolation- respiratory, contact and droplet per protocol    Diagnostics:   Severe hyponatremia  CBC, CMP, CRP, LDH and Portable CXR    Management: Initiate targeted therapy: continue Paxlovid unless otherwise recommended by Critical Care, Dexamethasone PO/IV 6mg daily x10 days and Anticoagulation, Patient admitted to critical care- Will initiate prophylactic dose anticoagulation, Maintain oxygen saturations 92-96% via Nasal Cannula  LPM and monitor with continuous/intermittent pulse oximetry. Inhaled bronchodilators as needed for shortness of breath. Continuous cardiac monitoring.      Diarrhea  Possibly 2/2 COVID 19 infection   Will check stool studies additionally      VTE Risk Mitigation (From admission, onward)    None             Kimberly López MD  Department of Hospital Medicine   [facility] - Emergency Dept

## 2022-05-16 NOTE — ASSESSMENT & PLAN NOTE
Diagnosed 1 day prior to admission  Meets criteria for Remdesivir, already given Paxlovid. Reordered but discuss with eICU MD for further recommendations i.e. use of Remdesivir instead  Patient is identified as High risk for severe complications of COVID 19 based on COVID risk score of 3   Initiate standard COVID protocols; COVID-19 testing ,Infection Control notification  and isolation- respiratory, contact and droplet per protocol    Diagnostics:   Severe hyponatremia  CBC, CMP, CRP, LDH and Portable CXR    Management: Initiate targeted therapy: continue Paxlovid unless otherwise recommended by Critical Care, Dexamethasone PO/IV 6mg daily x10 days and Anticoagulation, Patient admitted to critical care- Will initiate prophylactic dose anticoagulation, Maintain oxygen saturations 92-96% via Nasal Cannula  LPM and monitor with continuous/intermittent pulse oximetry. Inhaled bronchodilators as needed for shortness of breath. Continuous cardiac monitoring.

## 2022-05-17 LAB
ANION GAP SERPL CALC-SCNC: 10 MMOL/L (ref 8–16)
ANION GAP SERPL CALC-SCNC: 10 MMOL/L (ref 8–16)
ANION GAP SERPL CALC-SCNC: 12 MMOL/L (ref 8–16)
ANION GAP SERPL CALC-SCNC: 9 MMOL/L (ref 8–16)
BASOPHILS # BLD AUTO: 0.01 K/UL (ref 0–0.2)
BASOPHILS NFR BLD: 0.3 % (ref 0–1.9)
BUN SERPL-MCNC: 10 MG/DL (ref 8–23)
BUN SERPL-MCNC: 10 MG/DL (ref 8–23)
BUN SERPL-MCNC: 13 MG/DL (ref 8–23)
BUN SERPL-MCNC: 15 MG/DL (ref 8–23)
CALCIUM SERPL-MCNC: 8.5 MG/DL (ref 8.7–10.5)
CALCIUM SERPL-MCNC: 8.8 MG/DL (ref 8.7–10.5)
CALCIUM SERPL-MCNC: 9.1 MG/DL (ref 8.7–10.5)
CALCIUM SERPL-MCNC: 9.2 MG/DL (ref 8.7–10.5)
CHLORIDE SERPL-SCNC: 101 MMOL/L (ref 95–110)
CHLORIDE SERPL-SCNC: 101 MMOL/L (ref 95–110)
CHLORIDE SERPL-SCNC: 94 MMOL/L (ref 95–110)
CHLORIDE SERPL-SCNC: 98 MMOL/L (ref 95–110)
CO2 SERPL-SCNC: 18 MMOL/L (ref 23–29)
CO2 SERPL-SCNC: 21 MMOL/L (ref 23–29)
CO2 SERPL-SCNC: 22 MMOL/L (ref 23–29)
CO2 SERPL-SCNC: 24 MMOL/L (ref 23–29)
CREAT SERPL-MCNC: 0.7 MG/DL (ref 0.5–1.4)
CREAT SERPL-MCNC: 0.8 MG/DL (ref 0.5–1.4)
DIFFERENTIAL METHOD: ABNORMAL
EOSINOPHIL # BLD AUTO: 0 K/UL (ref 0–0.5)
EOSINOPHIL NFR BLD: 0 % (ref 0–8)
ERYTHROCYTE [DISTWIDTH] IN BLOOD BY AUTOMATED COUNT: 11.9 % (ref 11.5–14.5)
EST. GFR  (AFRICAN AMERICAN): >60 ML/MIN/1.73 M^2
EST. GFR  (NON AFRICAN AMERICAN): >60 ML/MIN/1.73 M^2
GLUCOSE SERPL-MCNC: 103 MG/DL (ref 70–110)
GLUCOSE SERPL-MCNC: 105 MG/DL (ref 70–110)
GLUCOSE SERPL-MCNC: 129 MG/DL (ref 70–110)
GLUCOSE SERPL-MCNC: 151 MG/DL (ref 70–110)
HCT VFR BLD AUTO: 36 % (ref 37–48.5)
HGB BLD-MCNC: 13 G/DL (ref 12–16)
IMM GRANULOCYTES # BLD AUTO: 0.07 K/UL (ref 0–0.04)
IMM GRANULOCYTES NFR BLD AUTO: 1.9 % (ref 0–0.5)
LYMPHOCYTES # BLD AUTO: 0.9 K/UL (ref 1–4.8)
LYMPHOCYTES NFR BLD: 23.1 % (ref 18–48)
MAGNESIUM SERPL-MCNC: 1.9 MG/DL (ref 1.6–2.6)
MCH RBC QN AUTO: 29.1 PG (ref 27–31)
MCHC RBC AUTO-ENTMCNC: 36.1 G/DL (ref 32–36)
MCV RBC AUTO: 81 FL (ref 82–98)
MONOCYTES # BLD AUTO: 0.4 K/UL (ref 0.3–1)
MONOCYTES NFR BLD: 11.4 % (ref 4–15)
NEUTROPHILS # BLD AUTO: 2.4 K/UL (ref 1.8–7.7)
NEUTROPHILS NFR BLD: 63.3 % (ref 38–73)
NRBC BLD-RTO: 0 /100 WBC
OSMOLALITY SERPL: 230 MOSM/KG (ref 275–295)
OSMOLALITY UR: 529 MOSM/KG (ref 50–1200)
PLATELET # BLD AUTO: 272 K/UL (ref 150–450)
PMV BLD AUTO: 9.2 FL (ref 9.2–12.9)
POTASSIUM SERPL-SCNC: 3.2 MMOL/L (ref 3.5–5.1)
POTASSIUM SERPL-SCNC: 3.2 MMOL/L (ref 3.5–5.1)
POTASSIUM SERPL-SCNC: 3.9 MMOL/L (ref 3.5–5.1)
POTASSIUM SERPL-SCNC: 4 MMOL/L (ref 3.5–5.1)
RBC # BLD AUTO: 4.47 M/UL (ref 4–5.4)
SODIUM SERPL-SCNC: 126 MMOL/L (ref 136–145)
SODIUM SERPL-SCNC: 131 MMOL/L (ref 136–145)
SODIUM SERPL-SCNC: 131 MMOL/L (ref 136–145)
SODIUM SERPL-SCNC: 132 MMOL/L (ref 136–145)
WBC # BLD AUTO: 3.76 K/UL (ref 3.9–12.7)

## 2022-05-17 PROCEDURE — 63600175 PHARM REV CODE 636 W HCPCS: Performed by: INTERNAL MEDICINE

## 2022-05-17 PROCEDURE — 25000003 PHARM REV CODE 250: Performed by: HOSPITALIST

## 2022-05-17 PROCEDURE — 99232 SBSQ HOSP IP/OBS MODERATE 35: CPT | Mod: ,,, | Performed by: HOSPITALIST

## 2022-05-17 PROCEDURE — 85025 COMPLETE CBC W/AUTO DIFF WBC: CPT | Performed by: FAMILY MEDICINE

## 2022-05-17 PROCEDURE — 99232 PR SUBSEQUENT HOSPITAL CARE,LEVL II: ICD-10-PCS | Mod: ,,, | Performed by: HOSPITALIST

## 2022-05-17 PROCEDURE — 36415 COLL VENOUS BLD VENIPUNCTURE: CPT | Performed by: INTERNAL MEDICINE

## 2022-05-17 PROCEDURE — 63600175 PHARM REV CODE 636 W HCPCS: Performed by: FAMILY MEDICINE

## 2022-05-17 PROCEDURE — 83735 ASSAY OF MAGNESIUM: CPT | Performed by: INTERNAL MEDICINE

## 2022-05-17 PROCEDURE — 97166 OT EVAL MOD COMPLEX 45 MIN: CPT

## 2022-05-17 PROCEDURE — 27000207 HC ISOLATION

## 2022-05-17 PROCEDURE — 63600175 PHARM REV CODE 636 W HCPCS: Performed by: HOSPITALIST

## 2022-05-17 PROCEDURE — 25000003 PHARM REV CODE 250: Performed by: FAMILY MEDICINE

## 2022-05-17 PROCEDURE — 97530 THERAPEUTIC ACTIVITIES: CPT

## 2022-05-17 PROCEDURE — 80048 BASIC METABOLIC PNL TOTAL CA: CPT | Mod: 91 | Performed by: INTERNAL MEDICINE

## 2022-05-17 PROCEDURE — 97162 PT EVAL MOD COMPLEX 30 MIN: CPT

## 2022-05-17 PROCEDURE — 21400001 HC TELEMETRY ROOM

## 2022-05-17 PROCEDURE — C9113 INJ PANTOPRAZOLE SODIUM, VIA: HCPCS | Performed by: INTERNAL MEDICINE

## 2022-05-17 RX ORDER — DIPHENOXYLATE HYDROCHLORIDE AND ATROPINE SULFATE 2.5; .025 MG/1; MG/1
2 TABLET ORAL ONCE
Status: COMPLETED | OUTPATIENT
Start: 2022-05-17 | End: 2022-05-17

## 2022-05-17 RX ORDER — POTASSIUM CHLORIDE 7.45 MG/ML
10 INJECTION INTRAVENOUS
Status: COMPLETED | OUTPATIENT
Start: 2022-05-17 | End: 2022-05-17

## 2022-05-17 RX ADMIN — HYDROCORTISONE SODIUM SUCCINATE 125 MG: 250 INJECTION, POWDER, FOR SOLUTION INTRAMUSCULAR; INTRAVENOUS at 05:05

## 2022-05-17 RX ADMIN — PANTOPRAZOLE SODIUM 40 MG: 40 INJECTION, POWDER, FOR SOLUTION INTRAVENOUS at 08:05

## 2022-05-17 RX ADMIN — POTASSIUM CHLORIDE AND SODIUM CHLORIDE: 900; 150 INJECTION, SOLUTION INTRAVENOUS at 02:05

## 2022-05-17 RX ADMIN — DIPHENOXYLATE HYDROCHLORIDE AND ATROPINE SULFATE 2 TABLET: 2.5; .025 TABLET ORAL at 02:05

## 2022-05-17 RX ADMIN — POTASSIUM CHLORIDE 10 MEQ: 7.46 INJECTION, SOLUTION INTRAVENOUS at 02:05

## 2022-05-17 RX ADMIN — POTASSIUM CHLORIDE 10 MEQ: 7.46 INJECTION, SOLUTION INTRAVENOUS at 03:05

## 2022-05-17 RX ADMIN — ASPIRIN 81 MG: 81 TABLET, DELAYED RELEASE ORAL at 08:05

## 2022-05-17 RX ADMIN — POTASSIUM CHLORIDE 10 MEQ: 7.46 INJECTION, SOLUTION INTRAVENOUS at 04:05

## 2022-05-17 RX ADMIN — ENOXAPARIN SODIUM 30 MG: 30 INJECTION SUBCUTANEOUS at 05:05

## 2022-05-17 RX ADMIN — HYDROCORTISONE SODIUM SUCCINATE 125 MG: 250 INJECTION, POWDER, FOR SOLUTION INTRAMUSCULAR; INTRAVENOUS at 01:05

## 2022-05-17 RX ADMIN — HYDROCORTISONE SODIUM SUCCINATE 125 MG: 250 INJECTION, POWDER, FOR SOLUTION INTRAMUSCULAR; INTRAVENOUS at 10:05

## 2022-05-17 RX ADMIN — FAMOTIDINE 20 MG: 10 INJECTION INTRAVENOUS at 09:05

## 2022-05-17 RX ADMIN — LEVOTHYROXINE SODIUM 88 MCG: 88 TABLET ORAL at 06:05

## 2022-05-17 RX ADMIN — POTASSIUM CHLORIDE 10 MEQ: 7.46 INJECTION, SOLUTION INTRAVENOUS at 01:05

## 2022-05-17 RX ADMIN — ESCITALOPRAM 5 MG: 5 TABLET, FILM COATED ORAL at 10:05

## 2022-05-17 RX ADMIN — FAMOTIDINE 20 MG: 10 INJECTION INTRAVENOUS at 08:05

## 2022-05-17 NOTE — PLAN OF CARE
Problem: Adult Inpatient Plan of Care  Goal: Plan of Care Review  Outcome: Ongoing, Progressing     Problem: Adult Inpatient Plan of Care  Goal: Optimal Comfort and Wellbeing  Outcome: Ongoing, Progressing     Problem: Electrolyte Imbalance  Goal: Electrolyte Balance  Outcome: Ongoing, Progressing     Problem: Fluid Volume Deficit  Goal: Fluid Balance  Outcome: Ongoing, Progressing

## 2022-05-17 NOTE — EICU
Rounding (Video Assessment):  Yes    Intervention Initiated From:  Bedside    Cici Communicated with Bedside Nurse regarding:  Medication and Other    Nurse Notified:  Yes    Doctor Notified:  Yes    Comments: Informed Dr. Ch: PICC RN unable to get PICC line. Pt has18g. Pt very nauseated; she prefers phenergan if possible. Due to nausea; asked if potassium replacement IV? K=3. RN is asking for oropeza since pt is very weak. Pt's daughter wants to talk to MDs in am about remdesivir- does not want mother to get med until she feels comfortable about drug. Pt (mom) agrees. RN to hold med for tonight

## 2022-05-17 NOTE — EICU
Brief Admit Note     86 yr old female with PMH CKD, GERD, hyperlipidemia, hypertension, paroxysmal AFib, thyroid disease     Current problems :     COVID +ve   Diarrhoea   Hyponatremia   Hypokalemia   Dehydration        CXR is without acute infiltrates and patient is sating well.   No dyspnea   Will change paxlovid to Remdesvir due to the fact that the patient is having profuse diarrhoea     Studies have been sent for hyponatremia   Since patient is dehydrated from profuse diarrhoea as well, will initiate NS at 50 cc/ hr   Chemistry q 4 hrs   KCL 40 meq IV once PICC line is in   Currently due to lack of CNS symptoms, does not qualify for 3% saline     Will need VBG to look at ph   Inflammatory markers     DVT prophylaxis with heparin s/q   GI prophylaxis

## 2022-05-17 NOTE — PT/OT/SLP EVAL
Physical Therapy Evaluation    Patient Name:  Selene Posey   MRN:  [id number]    Recommendations:     Discharge Recommendations:   (home with home health vs skilled placement)   Discharge Equipment Recommendations:  (assessment ongoing)   Barriers to discharge: None    Assessment:     Selene Posey is a 86 y.o. female admitted with a medical diagnosis of Hyponatremia.  She presents with the following impairments/functional limitations:  weakness, impaired endurance, impaired self care skills, impaired functional mobilty, impaired balance, impaired cardiopulmonary response to activity.    Rehab Prognosis: Good; patient would benefit from acute skilled PT services to address these deficits and reach maximum level of function.    Recent Surgery: * No surgery found *      Plan:     During this hospitalization, patient to be seen  (3-5x/wk) to address the identified rehab impairments via gait training, therapeutic activities, therapeutic exercises and progress toward the following goals:    · Plan of Care Expires:   (upon facility discharge)    Subjective     Chief Complaint: weakness and diarrhea   Patient/Family Comments/goals: increase strength and endurance for return to PLOF  Pain/Comfort:  · Pain Rating 1: 0/10    Patients cultural, spiritual, Gnosticist conflicts given the current situation: no    Living Environment:  Patient lives alone in a  home in [place] with no steps to enter.  Prior to admission, patients level of function was independent, active .  Equipment used at home: none.  DME owned (not currently used): none.  Upon discharge, patient will have assistance from his family.    Objective:     Communicated with RN prior to session.  Patient found HOB elevated with bed alarm, peripheral IV, pulse ox (continuous), telemetry  upon PT entry to room.    General Precautions: Standard, airborne, droplet, contact, fall   Orthopedic Precautions:N/A   Braces: N/A  Respiratory  Status: Room air    Exams:  · Cognitive Exam:  Patient is oriented to Person, Place, Time and Situation  · RLE ROM: WFL  · RLE Strength: 3+/5 to 4-/5  · LLE ROM: WFL  · LLE Strength: 3+/5 to 4-/5    Functional Mobility:  · Bed Mobility:  Rolling Left:  contact guard assistance  · Rolling Right: contact guard assistance   · Transfers: activity did not occur on this date secondary to patient fatigue and diarrhea   · Gait: not assessed at this time secondary to fatigue and diarrhea    Therapeutic Activities and Exercises:   initial PT evaulation    Patient Education Provided:              -PT roles, expectations, and POC               -Safety with mobility              -Benefits of OOB activities to increase strength and functional mobility               -Performing ther ex for increasing LE ROM and strength              -Discharge recommendations     AM-PAC 6 CLICK MOBILITY  Total Score:      Patient left HOB elevated with all lines intact, call button in reach, bed alarm on and RN notified.    GOALS:   1. Patient to perform bed mobility with mod I  2. Patient to require CGA for transfers using LRAD  3. Patient to ambulate 100' with CGA using :RAD  4. Patient to tolerate sitting up in chair > 2 hours    History:     Past Medical History:   Diagnosis Date    Bronchiectasis     CKD (chronic kidney disease)     GERD (gastroesophageal reflux disease)     High cholesterol     Hypertension     Paroxysmal atrial fibrillation     Thyroid disease        Past Surgical History:   Procedure Laterality Date    APPENDECTOMY      HYSTERECTOMY         Time Tracking:     PT Received On: 05/17/22  PT Start Time: 1307     PT Stop Time: 1328  PT Total Time (min): 21 min     Billable Minutes: Evaluation 21 min      05/17/2022

## 2022-05-17 NOTE — ASSESSMENT & PLAN NOTE
K+ 3.0 on admission   Likely 2/2 GI losses - diarrhea  Will replace PO if tolerated  Check magnesium levels  Improved

## 2022-05-17 NOTE — NURSING
0815-pt awake. Alert. Drowsy. But pt able to answer questions. Pt states correct place, time. Reoriented to situation.   0830-Spoke with patient and son over phone. Informed son and patient that's its patients decision on receiving remedisivir. Both voiced understanding. Updated pt son of stable condition.   0840-reassess orientation at this time pt answers all questions correctly and appropriate.

## 2022-05-17 NOTE — NURSING
"0225- Call received from [facility] main lab, Osmolality 230. EICU updated with results.   Pt is resting comfortable at this time. NAD noted, Uneventful night. Pts urine output 6000ml. No seizure activity or confusion noted. Pt has not had any diarrhea during this shift. Nausea/vomiting controlled with Phenergan. Pt had stated, " I am feeling better".  Will give report to oncoming RN.   "

## 2022-05-17 NOTE — ASSESSMENT & PLAN NOTE
Severe hyponatremia, Na+ 111 on admission  Possibly 2/2 hypovolemia from nausea/vomiting associated with covid-19 virus infection though worsened with IVFs - now considering SIADH vs adrenal insufficiency or other  Will order urine studies though fluid bolus given prior to collection  BMP q2h if giving 3% saline infusion or bolus  Evaluate for fluid overload   Will admit to ICU and await recommendations from critical care team regarding 3% saline especially given hypokalemia  Attempting to get PICC placement now   Check TSH, magnesium  Stress dose steroids ordered  Improved

## 2022-05-17 NOTE — NURSING
Spoke with patient, states still undecided regarding coivd 19 medicine. Son will visit today and will speak with md. Set breakfast up for patient. Pt tolerating well. Pt denies any nausea at this time.

## 2022-05-17 NOTE — PROGRESS NOTES
[facility] Medicine  Progress Note    Patient Name: Selene Posey  MRN: [id number]  Patient Class: IP- Inpatient   Admission Date: 5/16/2022  Length of Stay: 1 days  Attending Physician: Serafin Zamora MD  Primary Care Provider: Robbie Thibodeaux MD        Subjective:     Principal Problem:Hyponatremia        HPI:  Patient is an 86-year-old female with past medical history of CKD, GERD, hyperlipidemia, hypertension, paroxysmal AFib, thyroid disease who presents to the ER after being diagnosed with COVID-19 virus infection yesterday and now with worsening diarrhea.  Patient is a poor historian but says that she is having profuse diarrhea. Does not feel short of breath but feels bad all over. Has not been able to tolerate PO intake for several days due to diarrhea. Uncertain if she has had fever but reports cough. In the ER, BNP slightly elevated at 124, troponin normal, lactate normal, K+ 3.0, Na+ 111. Prior to lab results she was given 1L NS bolus. Repeat sodium trended to 110. Hospital team called for admission.       Overview/Hospital Course:  No notes on file    Interval History:  He is feeling better.  She is no longer is having diarrhea.  Denies nausea, vomiting, fever, chills or shortness of breath.  He does feel a little weak.    Review of Systems   Constitutional:  Positive for fatigue. Negative for chills and fever.   HENT: Negative.     Eyes: Negative.    Respiratory: Negative.     Cardiovascular: Negative.    Gastrointestinal:  Negative for diarrhea, nausea and vomiting.   Endocrine: Negative.    Genitourinary: Negative.    Musculoskeletal: Negative.    Skin: Negative.    Allergic/Immunologic: Negative.    Neurological: Negative.    Hematological: Negative.    Psychiatric/Behavioral: Negative.     Objective:     Vital Signs (Most Recent):  Temp: 97.2 °F (36.2 °C) (05/17/22 0702)  Pulse: (!) 49 (05/17/22 0702)  Resp: 20 (05/17/22 0702)  BP: (!) 100/58 (05/17/22 0702)  SpO2: 99 %  (05/17/22 0821)   Vital Signs (24h Range):  Temp:  [97.2 °F (36.2 °C)-98.7 °F (37.1 °C)] 97.2 °F (36.2 °C)  Pulse:  [49-68] 49  Resp:  [13-26] 20  SpO2:  [94 %-99 %] 99 %  BP: (100-165)/() 100/58     Weight: 69.9 kg (154 lb 1.6 oz)  Body mass index is 28.19 kg/m².    Intake/Output Summary (Last 24 hours) at 5/17/2022 1006  Last data filed at 5/17/2022 0655  Gross per 24 hour   Intake 1060 ml   Output 6100 ml   Net -5040 ml      Physical Exam  Vitals and nursing note reviewed.   Constitutional:       Appearance: Normal appearance.   HENT:      Head: Normocephalic and atraumatic.      Right Ear: External ear normal.      Left Ear: External ear normal.      Nose: Nose normal.      Mouth/Throat:      Mouth: Mucous membranes are moist.   Eyes:      Extraocular Movements: Extraocular movements intact.   Cardiovascular:      Rate and Rhythm: Normal rate and regular rhythm.      Pulses: Normal pulses.      Heart sounds: Normal heart sounds.   Pulmonary:      Effort: Pulmonary effort is normal.      Breath sounds: Normal breath sounds.   Abdominal:      General: Abdomen is flat. Bowel sounds are normal.      Palpations: Abdomen is soft.   Musculoskeletal:         General: Normal range of motion.      Cervical back: Normal range of motion and neck supple.   Skin:     General: Skin is warm.      Capillary Refill: Capillary refill takes 2 to 3 seconds.   Neurological:      General: No focal deficit present.      Mental Status: She is alert and oriented to person, place, and time. Mental status is at baseline.   Psychiatric:         Mood and Affect: Mood normal.         Behavior: Behavior normal.         Thought Content: Thought content normal.         Judgment: Judgment normal.       Significant Labs: All pertinent labs within the past 24 hours have been reviewed.    Significant Imaging: I have reviewed all pertinent imaging results/findings within the past 24 hours.      Assessment/Plan:      * Hyponatremia  Severe  hyponatremia, Na+ 111 on admission  Possibly 2/2 hypovolemia from nausea/vomiting associated with covid-19 virus infection though worsened with IVFs - now considering SIADH vs adrenal insufficiency or other  Will order urine studies though fluid bolus given prior to collection  BMP q2h if giving 3% saline infusion or bolus  Evaluate for fluid overload   Will admit to ICU and await recommendations from critical care team regarding 3% saline especially given hypokalemia  Attempting to get PICC placement now   Check TSH, magnesium  Stress dose steroids ordered  Improved    Hypothyroid  On home levothyroxine 88 mcg  Recheck TSH given severity of hyponatremia  Adjust dose if needed      Hypokalemia  K+ 3.0 on admission   Likely 2/2 GI losses - diarrhea  Will replace PO if tolerated  Check magnesium levels  Improved    COVID-19 virus infection  Diagnosed 1 day prior to admission  Meets criteria for Remdesivir, already given Paxlovid. Reordered but discuss with eICU MD for further recommendations i.e. use of Remdesivir instead  Patient is identified as High risk for severe complications of COVID 19 based on COVID risk score of 3   Initiate standard COVID protocols; COVID-19 testing ,Infection Control notification  and isolation- respiratory, contact and droplet per protocol    Diagnostics:   Severe hyponatremia  CBC, CMP, CRP, LDH and Portable CXR    Management: Initiate targeted therapy: continue Paxlovid unless otherwise recommended by Critical Care, Dexamethasone PO/IV 6mg daily x10 days and Anticoagulation, Patient admitted to critical care- Will initiate prophylactic dose anticoagulation, Maintain oxygen saturations 92-96% via Nasal Cannula  LPM and monitor with continuous/intermittent pulse oximetry. Inhaled bronchodilators as needed for shortness of breath. Continuous cardiac monitoring.      Diarrhea  Possibly 2/2 COVID 19 infection   Will check stool studies additionally  C diff negative  Stools culture  pending  Diarrhea is resolving        VTE Risk Mitigation (From admission, onward)         Ordered     enoxaparin injection 30 mg  Daily         05/16/22 2119     Place sequential compression device  Until discontinued         05/16/22 2105     IP VTE HIGH RISK PATIENT  Once         05/16/22 2105     Place sequential compression device  Until discontinued         05/16/22 2105                Discharge Planning   ELIZABETH:      Code Status: Full Code   Is the patient medically ready for discharge?:     Reason for patient still in hospital (select all that apply): Treatment  Discharge Plan A: Home                  Serafin Zamora MD  Department of [facility] Medicine   [facility]

## 2022-05-17 NOTE — EICU
Alerted by bedside that the PICC could not be placed.     Has 2 IV s     NaCl with KCL ordered as continous infusion instead of nacl   KCL 10 meq x 2 doses   KCL 40 meq po after phenergan injection

## 2022-05-17 NOTE — SUBJECTIVE & OBJECTIVE
Interval History:  He is feeling better.  She is no longer is having diarrhea.  Denies nausea, vomiting, fever, chills or shortness of breath.  He does feel a little weak.    Review of Systems   Constitutional:  Positive for fatigue. Negative for chills and fever.   HENT: Negative.     Eyes: Negative.    Respiratory: Negative.     Cardiovascular: Negative.    Gastrointestinal:  Negative for diarrhea, nausea and vomiting.   Endocrine: Negative.    Genitourinary: Negative.    Musculoskeletal: Negative.    Skin: Negative.    Allergic/Immunologic: Negative.    Neurological: Negative.    Hematological: Negative.    Psychiatric/Behavioral: Negative.     Objective:     Vital Signs (Most Recent):  Temp: 97.2 °F (36.2 °C) (05/17/22 0702)  Pulse: (!) 49 (05/17/22 0702)  Resp: 20 (05/17/22 0702)  BP: (!) 100/58 (05/17/22 0702)  SpO2: 99 % (05/17/22 0821)   Vital Signs (24h Range):  Temp:  [97.2 °F (36.2 °C)-98.7 °F (37.1 °C)] 97.2 °F (36.2 °C)  Pulse:  [49-68] 49  Resp:  [13-26] 20  SpO2:  [94 %-99 %] 99 %  BP: (100-165)/() 100/58     Weight: 69.9 kg (154 lb 1.6 oz)  Body mass index is 28.19 kg/m².    Intake/Output Summary (Last 24 hours) at 5/17/2022 1006  Last data filed at 5/17/2022 0655  Gross per 24 hour   Intake 1060 ml   Output 6100 ml   Net -5040 ml      Physical Exam  Vitals and nursing note reviewed.   Constitutional:       Appearance: Normal appearance.   HENT:      Head: Normocephalic and atraumatic.      Right Ear: External ear normal.      Left Ear: External ear normal.      Nose: Nose normal.      Mouth/Throat:      Mouth: Mucous membranes are moist.   Eyes:      Extraocular Movements: Extraocular movements intact.   Cardiovascular:      Rate and Rhythm: Normal rate and regular rhythm.      Pulses: Normal pulses.      Heart sounds: Normal heart sounds.   Pulmonary:      Effort: Pulmonary effort is normal.      Breath sounds: Normal breath sounds.   Abdominal:      General: Abdomen is flat. Bowel sounds are  normal.      Palpations: Abdomen is soft.   Musculoskeletal:         General: Normal range of motion.      Cervical back: Normal range of motion and neck supple.   Skin:     General: Skin is warm.      Capillary Refill: Capillary refill takes 2 to 3 seconds.   Neurological:      General: No focal deficit present.      Mental Status: She is alert and oriented to person, place, and time. Mental status is at baseline.   Psychiatric:         Mood and Affect: Mood normal.         Behavior: Behavior normal.         Thought Content: Thought content normal.         Judgment: Judgment normal.       Significant Labs: All pertinent labs within the past 24 hours have been reviewed.    Significant Imaging: I have reviewed all pertinent imaging results/findings within the past 24 hours.

## 2022-05-17 NOTE — NURSING
1300-assisted pt to med surg in wheelchair. Pt tolerated well. Report given to gwen avalos. Oriented pt to room. Pt voiced understanding. Updated son over phone about transfer to medical floor. Tele 31 in use. nad noted. resp even and unlabored.

## 2022-05-17 NOTE — PLAN OF CARE
Problem: Adult Inpatient Plan of Care  Goal: Plan of Care Review  Outcome: Ongoing, Progressing  Goal: Patient-Specific Goal (Individualized)  Outcome: Ongoing, Progressing  Goal: Absence of Hospital-Acquired Illness or Injury  Outcome: Ongoing, Progressing  Goal: Optimal Comfort and Wellbeing  Outcome: Ongoing, Progressing  Goal: Readiness for Transition of Care  Outcome: Ongoing, Progressing     Problem: Infection  Goal: Absence of Infection Signs and Symptoms  Outcome: Ongoing, Progressing     Problem: Skin Injury Risk Increased  Goal: Skin Health and Integrity  Outcome: Ongoing, Progressing     Problem: Electrolyte Imbalance  Goal: Electrolyte Balance  Outcome: Ongoing, Progressing     Problem: Activity Intolerance  Goal: Enhanced Capacity and Energy  Outcome: Ongoing, Progressing     Problem: Depression  Goal: Improved Mood  Outcome: Ongoing, Progressing     Problem: Fluid Volume Deficit  Goal: Fluid Balance  Outcome: Ongoing, Progressing

## 2022-05-17 NOTE — PLAN OF CARE
05/17/22 1324   Discharge Reassessment   Assessment Type Discharge Planning Reassessment   Left message on patient's cell phone of needing her to call me in regard to Advance Directive/POA. Nurse provided with packet for patient to look at & my phone number for her to call me back so I can explain the process.

## 2022-05-17 NOTE — NURSING
1145-oropeza removed at this time. Assisted pt to shower. Pt tolerated well but very weak. Watery stools noted. Assisted pt back to bed. Complete shower completed. Tele number 31 placed at this time with cont pulse ox.

## 2022-05-17 NOTE — NURSING
Multiple attempts made to start picc line to right upper arm after obtaining consent from patient. All attempts unsuccessful due to guidewire not advancing past the axilla. Bleeding at sites controlled with no swelling and minimal bruising.  While attempting to locate a vessel on the left arm attending rn informed me that patient would not be getting hypertonic saline per md orders and no longer will need central venous access. Procedure stopped at this time. 18 g peripheral iv placed to left upper arm x 1 attempt with positive blood return and flush. Site unremarkable. Explained the situation to patient. Pt verbalized understanding.

## 2022-05-17 NOTE — ED NOTES
Dr. López in unit requesting a picc line be placed for hypertonic saline admin. Dr. Deluca agrees with plan and will place an ed order for a picc line to be placed. Will place picc line in icu once icu arrives due to  space in patients ed room at this time.

## 2022-05-17 NOTE — NURSING
"1850- Pt received from ER . Awake and alert. RR even and non labored. NAD noted. Pt connected to cardiac monitor, B/P and cont pulse ox.Reviewed poc, call light placed within reach.   1910- Justin RN at bedside to place picc line.   2005- RN unable to place picc line. Pt distressed. 18 ross placed left upper arm. RN informed that  Pt will not be receiving 3% hypertonic saline at this time due to not meeting criteria. Pt is awake, alert and oriented. No confusion noted, no seizure activity noted.   2023 Spoke with Naomi, Pts daughter. Update provided.Becki stated , "shedid not want her Mother to receive remdesivir infusion". Instructed to discuss concerns with MD.  2030- Pt incontinent of urine and stool. No stool noted. Pt to weak to use BSC at this time. Danay care provided.   2111-Spoke with CHRISTINE Rolon RN regarding families concerns, clarification regarding MD orders, and Pt needing a 2nd antiemteic.   2123- order received to change NS to Ns with  20meq kcl to infuse at 50ml/hr.  2135-Becki called, had placed call on speaker where multiple family members wanted to ask questions, verbalized concerns regarding remdesivir infusion. Informed the family MD had been notified and family could discuss options tomorrow.Becki stated that "she will be making the decisions for her mother. Becki informed this was a family matter and needed to be discussed with Jamel.   2200-Spoke with Pt as Rashmi RN witness conversation. When Pt was asked if for any reason her heart should stop beating or she should stop breathing, who does she want staff to call first to make dicisions for Pt.t. Pt stated, " I want you to call my son  Jamel". SS/ consult placed for family assessment and to assist Pt  with advance directives.                                     2141-Spoke with Kassie and Jamel Hinton, Pts son and DIL. Update provided.   2230- Order received for oropeza cath, Pts bladder distended. Oropeza cath " placed using sterile technique. Immediately obtained 2000 ml clear yellow urine.Pt reports feeling immediate relief.

## 2022-05-17 NOTE — ASSESSMENT & PLAN NOTE
Possibly 2/2 COVID 19 infection   Will check stool studies additionally  C diff negative  Stools culture pending  Diarrhea is resolving

## 2022-05-17 NOTE — PT/OT/SLP EVAL
"Occupational Therapy   Evaluation    Name: Selene Posey  MRN: [id number]  Admitting Diagnosis:  Hyponatremia  Recent Surgery: * No surgery found *      Recommendations:     Discharge Recommendations:  dc home w/ hh   Discharge Equipment Recommendations:    none  Barriers to discharge:   limited assistance at home     Assessment:     Selene Posey is a 86 y.o. female with a medical diagnosis of Hyponatremia.  She presents with decreased ind w/ all adls. Performance deficits affecting function:  decreased functional activity tolerance, decreased strength bue's , decreased static/dynamic stand balance and tolerance.     Rehab Prognosis: Good; patient would benefit from acute skilled OT services to address these deficits and reach maximum level of function.       Plan:     Patient to be seen   3-5 x's per week to address the above listed problems via  ot intervention  · Plan of Care Expires:  upon dc   · Plan of Care Reviewed with:  patient   Subjective     Chief Complaint: hyponATREMIA  Patient/Family Comments/goals: "to go back to work."    Occupational Profile:  Living Environment: patient was living at home alone and was Ind w/ all adls and Iadls.     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     Patients cultural, spiritual, Cheondoism conflicts given the current situation:      Objective:     Communicated with: nursing prior to session.  Patient found supine with   upon OT entry to room.    General Precautions: Standard,   covid precautions  Orthopedic Precautions:  none  Braces:  none  Respiratory Status: Room air    Occupational Performance:    Bed Mobility:    · Patient completed Rolling/Turning to Left with  stand by assistance  · Patient completed Rolling/Turning to Right with stand by assistance  · Patient completed Scooting/Bridging with stand by assistance  · Patient completed Supine " to Sit with stand by assistance  · Patient completed Sit to Supine with stand by assistance    Functional Mobility/Transfers:  · Patient completed Sit <> Stand Transfer with stand by assistance  with  hand-held assist   · Patient completed Bed <> Chair Transfer using Stand Pivot technique with contact guard assistance with hand-held assist    Activities of Daily Living:  · Self feed sua  · G/h sua  · ub dressing min a  · Lb dressing min a  · toileting min a     Cognitive/Visual Perceptual:  Cognitive/Psychosocial Skills:     -       Oriented to: Person, Place, Time and Situation     Physical Exam:  Patient's arom bue's wfls all planes.  Patient static/dynamic stand balance is fair+/fair: patient's functional activity tolerance is poor.           Treatment & Education:  PatientEducation:   performed initial ot eval this date. Patient did not want to get up at first, but I explained that she was getting too debilitated by staying in supine position and that it would be best for her to sit up as much as possible. Patient performed all bed mobility and sit to stand activities w/ therapist.      Patient left supine with all lines intact  GOALS:   1. Patient will increase functional activity tolerance to good in order to increase her ind w/ all adls.   2. Patient will perform toileting w/ sua  3. Patient will perform lb dressing w/ sua.    History:     Past Medical History:   Diagnosis Date    Bronchiectasis     CKD (chronic kidney disease)     GERD (gastroesophageal reflux disease)     High cholesterol     Hypertension     Paroxysmal atrial fibrillation     Thyroid disease        Past Surgical History:   Procedure Laterality Date    APPENDECTOMY      HYSTERECTOMY         Time Tracking:     OT Date of Treatment:  05/17/2022  OT Start Time:  13:30  OT Stop Time:  13:54  OT Total Time (min):  24    Billable Minutes: eval 15: 9 min theract    5/17/2022

## 2022-05-17 NOTE — CONSULTS
Nutrition Services    Assessment: 87 y/o WF admitted with Weakness and Hyponatremia - the Hyponatremia is improving.   -Poor PO intake on admit secondary to N/V, this is now improving and she was able to tolerate breakfast well. Discussed with RN.   + COVID-19.   Would recommend continuing IVFs with K+ riders at this time. Pt on Regular Diet - least restrictive.   -Labs and status reviewed   -No recent weight loss.     PMHx:   Patient Active Problem List   Diagnosis    Diarrhea    Intestinal gas excretion    Abdominal pain, other specified site    GERD (gastroesophageal reflux disease)    Trigger ring finger of right hand    Status post total left knee replacement    Difficulty walking    Weakness of right lower extremity    Hyponatremia    COVID-19 virus infection    Hypokalemia    Hypothyroid       Labs:  BMP:   Lab Results   Component Value Date     (L) 05/17/2022    CALCIUM 8.8 05/17/2022    MG 1.9 05/17/2022     LFTs:   Lab Results   Component Value Date    PROT 6.0 05/16/2022    ALBUMIN 3.2 (L) 05/16/2022    BILITOT 0.7 05/16/2022    AST 53 (H) 05/16/2022    ALKPHOS 57 05/16/2022    ALT 31 05/16/2022     DM:   Lab Results   Component Value Date    CREATININE 0.8 05/17/2022     Cardiac:   Lab Results   Component Value Date    TROPONINI <0.006 05/16/2022     (H) 05/16/2022     CBC:   Lab Results   Component Value Date    WBC 3.76 (L) 05/17/2022    HGB 13.0 05/17/2022    HCT 36.0 (L) 05/17/2022     05/17/2022    MCV 81 (L) 05/17/2022    RDW 11.9 05/17/2022     Iron Profile:   Lab Results   Component Value Date    WBC 3.76 (L) 05/17/2022    HGB 13.0 05/17/2022    HCT 36.0 (L) 05/17/2022     05/17/2022    MCV 81 (L) 05/17/2022    RDW 11.9 05/17/2022     Lipid Profile: No results found for: CHOL, HDL, LDL, TRIG, CHOLHDL    Meds:    aspirin  81 mg Oral Daily    diphenoxylate-atropine 2.5-0.025 mg  2 tablet Oral Once    enoxaparin  30 mg Subcutaneous Daily    EScitalopram  "oxalate  5 mg Oral Daily    famotidine (PF)  20 mg Intravenous BID    hydrocortisone sod succ (PF)  125 mg Intravenous Q8H    levothyroxine  88 mcg Oral Before breakfast    nirmatrelvir-ritonavir   Oral BID    pantoprazole  40 mg Intravenous Daily    remdesivir loading dose infusion  200 mg Intravenous Q24H    Followed by    [START ON 5/20/2022] remdesivir infusion  100 mg Intravenous Daily     Continuous Infusions:    0/9% NACL & POTASSIUM CHLORIDE 20 MEQ/L 50 mL/hr at 05/16/22 2318       Intake & Output:    Intake/Output Summary (Last 24 hours) at 5/17/2022 1322  Last data filed at 5/17/2022 1100  Gross per 24 hour   Intake 1060 ml   Output 6550 ml   Net -5490 ml       Ht: 5' 2"  Wt: 154 lbs  Wt Readings from Last 3 Encounters:   05/16/22 2000 69.9 kg (154 lb 1.6 oz)   05/16/22 1349 72.6 kg (160 lb)   05/15/22 1017 63.5 kg (140 lb)   03/23/22 1341 64.9 kg (143 lb)     IBW: [unfilled] lbs  Body mass index is 28.19 kg/m².  BMI Level: Overweight    Last Bowel Movement on 5/16      Caloric & Protein Goals: 1700 kcals/day; 70 gm protein/day    Diet: Regular    Nutrition Assessment / Plan:  +Weakness and Hyponatremia - the Hyponatremia is improving.   -Poor PO intake on admit secondary to N/V, this is now improving and she was able to tolerate breakfast well. Discussed with RN.   -Continue Regular, least restrictive PO Diet.       Anahy Rock, MS, RD, [facility]  [phone]    "

## 2022-05-18 LAB
ALBUMIN SERPL BCP-MCNC: 3.1 G/DL (ref 3.5–5.2)
ALP SERPL-CCNC: 60 U/L (ref 55–135)
ALT SERPL W/O P-5'-P-CCNC: 28 U/L (ref 10–44)
ANION GAP SERPL CALC-SCNC: 11 MMOL/L (ref 8–16)
AST SERPL-CCNC: 40 U/L (ref 10–40)
BASOPHILS # BLD AUTO: 0.01 K/UL (ref 0–0.2)
BASOPHILS NFR BLD: 0.2 % (ref 0–1.9)
BILIRUB SERPL-MCNC: 0.3 MG/DL (ref 0.1–1)
BUN SERPL-MCNC: 15 MG/DL (ref 8–23)
CALCIUM SERPL-MCNC: 8.4 MG/DL (ref 8.7–10.5)
CHLORIDE SERPL-SCNC: 102 MMOL/L (ref 95–110)
CO2 SERPL-SCNC: 20 MMOL/L (ref 23–29)
CREAT SERPL-MCNC: 0.8 MG/DL (ref 0.5–1.4)
DIFFERENTIAL METHOD: ABNORMAL
EOSINOPHIL # BLD AUTO: 0 K/UL (ref 0–0.5)
EOSINOPHIL NFR BLD: 0 % (ref 0–8)
ERYTHROCYTE [DISTWIDTH] IN BLOOD BY AUTOMATED COUNT: 12.3 % (ref 11.5–14.5)
EST. GFR  (AFRICAN AMERICAN): >60 ML/MIN/1.73 M^2
EST. GFR  (NON AFRICAN AMERICAN): >60 ML/MIN/1.73 M^2
GLUCOSE SERPL-MCNC: 101 MG/DL (ref 70–110)
HCT VFR BLD AUTO: 31.1 % (ref 37–48.5)
HGB BLD-MCNC: 11.2 G/DL (ref 12–16)
IMM GRANULOCYTES # BLD AUTO: 0.02 K/UL (ref 0–0.04)
IMM GRANULOCYTES NFR BLD AUTO: 0.3 % (ref 0–0.5)
LYMPHOCYTES # BLD AUTO: 1.9 K/UL (ref 1–4.8)
LYMPHOCYTES NFR BLD: 30 % (ref 18–48)
MAGNESIUM SERPL-MCNC: 1.9 MG/DL (ref 1.6–2.6)
MCH RBC QN AUTO: 29.3 PG (ref 27–31)
MCHC RBC AUTO-ENTMCNC: 36 G/DL (ref 32–36)
MCV RBC AUTO: 81 FL (ref 82–98)
MONOCYTES # BLD AUTO: 0.9 K/UL (ref 0.3–1)
MONOCYTES NFR BLD: 14.8 % (ref 4–15)
NEUTROPHILS # BLD AUTO: 3.5 K/UL (ref 1.8–7.7)
NEUTROPHILS NFR BLD: 54.7 % (ref 38–73)
NRBC BLD-RTO: 0 /100 WBC
PLATELET # BLD AUTO: 247 K/UL (ref 150–450)
PMV BLD AUTO: 9.4 FL (ref 9.2–12.9)
POTASSIUM SERPL-SCNC: 2.8 MMOL/L (ref 3.5–5.1)
PROT SERPL-MCNC: 6.2 G/DL (ref 6–8.4)
RBC # BLD AUTO: 3.82 M/UL (ref 4–5.4)
SODIUM SERPL-SCNC: 133 MMOL/L (ref 136–145)
WBC # BLD AUTO: 6.36 K/UL (ref 3.9–12.7)

## 2022-05-18 PROCEDURE — 85025 COMPLETE CBC W/AUTO DIFF WBC: CPT | Performed by: HOSPITALIST

## 2022-05-18 PROCEDURE — 27000207 HC ISOLATION

## 2022-05-18 PROCEDURE — 99232 SBSQ HOSP IP/OBS MODERATE 35: CPT | Mod: ,,, | Performed by: HOSPITALIST

## 2022-05-18 PROCEDURE — 25000003 PHARM REV CODE 250: Performed by: HOSPITALIST

## 2022-05-18 PROCEDURE — 97535 SELF CARE MNGMENT TRAINING: CPT

## 2022-05-18 PROCEDURE — 83735 ASSAY OF MAGNESIUM: CPT | Performed by: HOSPITALIST

## 2022-05-18 PROCEDURE — 97110 THERAPEUTIC EXERCISES: CPT

## 2022-05-18 PROCEDURE — 21400001 HC TELEMETRY ROOM

## 2022-05-18 PROCEDURE — 36415 COLL VENOUS BLD VENIPUNCTURE: CPT | Performed by: HOSPITALIST

## 2022-05-18 PROCEDURE — 94761 N-INVAS EAR/PLS OXIMETRY MLT: CPT

## 2022-05-18 PROCEDURE — 99232 PR SUBSEQUENT HOSPITAL CARE,LEVL II: ICD-10-PCS | Mod: ,,, | Performed by: HOSPITALIST

## 2022-05-18 PROCEDURE — 63600175 PHARM REV CODE 636 W HCPCS: Performed by: HOSPITALIST

## 2022-05-18 PROCEDURE — 80053 COMPREHEN METABOLIC PANEL: CPT | Performed by: HOSPITALIST

## 2022-05-18 PROCEDURE — C9113 INJ PANTOPRAZOLE SODIUM, VIA: HCPCS | Performed by: HOSPITALIST

## 2022-05-18 RX ORDER — POTASSIUM CHLORIDE 20 MEQ/1
20 TABLET, EXTENDED RELEASE ORAL ONCE
Status: COMPLETED | OUTPATIENT
Start: 2022-05-18 | End: 2022-05-18

## 2022-05-18 RX ORDER — POTASSIUM CHLORIDE 20 MEQ/1
20 TABLET, EXTENDED RELEASE ORAL 2 TIMES DAILY
Status: DISCONTINUED | OUTPATIENT
Start: 2022-05-18 | End: 2022-05-19 | Stop reason: HOSPADM

## 2022-05-18 RX ADMIN — ASPIRIN 81 MG: 81 TABLET, DELAYED RELEASE ORAL at 08:05

## 2022-05-18 RX ADMIN — LEVOTHYROXINE SODIUM 88 MCG: 88 TABLET ORAL at 06:05

## 2022-05-18 RX ADMIN — ESCITALOPRAM 5 MG: 5 TABLET, FILM COATED ORAL at 08:05

## 2022-05-18 RX ADMIN — ENOXAPARIN SODIUM 30 MG: 30 INJECTION SUBCUTANEOUS at 05:05

## 2022-05-18 RX ADMIN — POTASSIUM CHLORIDE AND SODIUM CHLORIDE: 900; 150 INJECTION, SOLUTION INTRAVENOUS at 03:05

## 2022-05-18 RX ADMIN — PANTOPRAZOLE SODIUM 40 MG: 40 INJECTION, POWDER, FOR SOLUTION INTRAVENOUS at 08:05

## 2022-05-18 RX ADMIN — FAMOTIDINE 20 MG: 10 INJECTION INTRAVENOUS at 08:05

## 2022-05-18 RX ADMIN — HYDROCORTISONE SODIUM SUCCINATE 125 MG: 250 INJECTION, POWDER, FOR SOLUTION INTRAMUSCULAR; INTRAVENOUS at 12:05

## 2022-05-18 RX ADMIN — POTASSIUM CHLORIDE 20 MEQ: 1500 TABLET, EXTENDED RELEASE ORAL at 03:05

## 2022-05-18 RX ADMIN — POTASSIUM CHLORIDE 20 MEQ: 1500 TABLET, EXTENDED RELEASE ORAL at 09:05

## 2022-05-18 RX ADMIN — POTASSIUM BICARBONATE 50 MEQ: 978 TABLET, EFFERVESCENT ORAL at 12:05

## 2022-05-18 RX ADMIN — FAMOTIDINE 20 MG: 10 INJECTION INTRAVENOUS at 09:05

## 2022-05-18 RX ADMIN — HYDROCORTISONE SODIUM SUCCINATE 125 MG: 250 INJECTION, POWDER, FOR SOLUTION INTRAMUSCULAR; INTRAVENOUS at 02:05

## 2022-05-18 NOTE — PLAN OF CARE
05/18/22 1514   Discharge Reassessment   Assessment Type Discharge Planning Reassessment   Did the patient's condition or plan change since previous assessment? No   Discharge Plan discussed with: Patient   Communicated ELIZABETH with patient/caregiver Yes   Discharge Plan A Home Health   Discharge Plan B Home   DME Needed Upon Discharge  none   Discharge Barriers Identified None   Why the patient remains in the hospital Requires continued medical care   Post-Acute Status   Post-Acute Authorization Home Health   Home Health Status Referrals Sent   Discharge Delays None known at this time   Patient resting in bed. Refused PT but states will see them once she gets a little rest as they gave her sleeping medication last night & it's making her drowsy. Plan is to be discharged tomorrow. States has used [facility] in the past; will set up home health with them. Referral sent. Denies any other needs at this time.

## 2022-05-18 NOTE — SUBJECTIVE & OBJECTIVE
Interval History:  She is no longer having diarrhea but states that she is too weak to be discharged home.  She wants a stable 1 more day    Review of Systems   Constitutional: Negative.    HENT: Negative.     Eyes: Negative.    Cardiovascular: Negative.    Gastrointestinal: Negative.    Endocrine: Negative.    Genitourinary: Negative.    Musculoskeletal: Negative.    Skin: Negative.    Allergic/Immunologic: Negative.    Neurological: Negative.    Hematological: Negative.    Psychiatric/Behavioral: Negative.     Objective:     Vital Signs (Most Recent):  Temp: 97.8 °F (36.6 °C) (05/18/22 1200)  Pulse: 66 (05/18/22 1200)  Resp: 17 (05/18/22 1200)  BP: (!) 145/64 (05/18/22 1200)  SpO2: 98 % (05/18/22 1200)   Vital Signs (24h Range):  Temp:  [97.7 °F (36.5 °C)-98.1 °F (36.7 °C)] 97.8 °F (36.6 °C)  Pulse:  [59-78] 66  Resp:  [17-20] 17  SpO2:  [97 %-98 %] 98 %  BP: (128-152)/(63-77) 145/64     Weight: 69.9 kg (154 lb 1.6 oz)  Body mass index is 28.19 kg/m².    Intake/Output Summary (Last 24 hours) at 5/18/2022 1405  Last data filed at 5/18/2022 1200  Gross per 24 hour   Intake 840 ml   Output 650 ml   Net 190 ml      Physical Exam  Constitutional:       Appearance: Normal appearance.   HENT:      Head: Normocephalic and atraumatic.      Nose: Nose normal.      Mouth/Throat:      Mouth: Mucous membranes are moist.   Eyes:      Extraocular Movements: Extraocular movements intact.   Cardiovascular:      Rate and Rhythm: Normal rate and regular rhythm.      Pulses: Normal pulses.      Heart sounds: Normal heart sounds.   Pulmonary:      Effort: Pulmonary effort is normal.      Breath sounds: Normal breath sounds.   Abdominal:      General: Abdomen is flat.   Musculoskeletal:         General: Normal range of motion.      Cervical back: Normal range of motion and neck supple.   Skin:     General: Skin is warm and dry.      Capillary Refill: Capillary refill takes 2 to 3 seconds.   Neurological:      General: No focal deficit  present.      Mental Status: She is alert and oriented to person, place, and time.   Psychiatric:         Mood and Affect: Mood normal.         Behavior: Behavior normal.         Thought Content: Thought content normal.       Significant Labs: All pertinent labs within the past 24 hours have been reviewed.    Significant Imaging: I have reviewed all pertinent imaging results/findings within the past 24 hours.

## 2022-05-18 NOTE — PT/OT/SLP PROGRESS
Patient declined to participate in physical therapy this morning stating she is too tired and needs to rest. RN and MD notified. PT will attempt another visit later on today.

## 2022-05-18 NOTE — PROGRESS NOTES
[facility] Medicine  Progress Note    Patient Name: Selene Posey  MRN: [id number]  Patient Class: IP- Inpatient   Admission Date: 5/16/2022  Length of Stay: 2 days  Attending Physician: Serafin Zamora MD  Primary Care Provider: Robbie Thibodeaux MD        Subjective:     Principal Problem:Hyponatremia        HPI:  Patient is an 86-year-old female with past medical history of CKD, GERD, hyperlipidemia, hypertension, paroxysmal AFib, thyroid disease who presents to the ER after being diagnosed with COVID-19 virus infection yesterday and now with worsening diarrhea.  Patient is a poor historian but says that she is having profuse diarrhea. Does not feel short of breath but feels bad all over. Has not been able to tolerate PO intake for several days due to diarrhea. Uncertain if she has had fever but reports cough. In the ER, BNP slightly elevated at 124, troponin normal, lactate normal, K+ 3.0, Na+ 111. Prior to lab results she was given 1L NS bolus. Repeat sodium trended to 110. Hospital team called for admission.       Overview/Hospital Course:  No notes on file    Interval History:  She is no longer having diarrhea but states that she is too weak to be discharged home.  She wants a stable 1 more day    Review of Systems   Constitutional: Negative.    HENT: Negative.     Eyes: Negative.    Cardiovascular: Negative.    Gastrointestinal: Negative.    Endocrine: Negative.    Genitourinary: Negative.    Musculoskeletal: Negative.    Skin: Negative.    Allergic/Immunologic: Negative.    Neurological: Negative.    Hematological: Negative.    Psychiatric/Behavioral: Negative.     Objective:     Vital Signs (Most Recent):  Temp: 97.8 °F (36.6 °C) (05/18/22 1200)  Pulse: 66 (05/18/22 1200)  Resp: 17 (05/18/22 1200)  BP: (!) 145/64 (05/18/22 1200)  SpO2: 98 % (05/18/22 1200)   Vital Signs (24h Range):  Temp:  [97.7 °F (36.5 °C)-98.1 °F (36.7 °C)] 97.8 °F (36.6 °C)  Pulse:  [59-78] 66  Resp:  [17-20]  17  SpO2:  [97 %-98 %] 98 %  BP: (128-152)/(63-77) 145/64     Weight: 69.9 kg (154 lb 1.6 oz)  Body mass index is 28.19 kg/m².    Intake/Output Summary (Last 24 hours) at 5/18/2022 1405  Last data filed at 5/18/2022 1200  Gross per 24 hour   Intake 840 ml   Output 650 ml   Net 190 ml      Physical Exam  Constitutional:       Appearance: Normal appearance.   HENT:      Head: Normocephalic and atraumatic.      Nose: Nose normal.      Mouth/Throat:      Mouth: Mucous membranes are moist.   Eyes:      Extraocular Movements: Extraocular movements intact.   Cardiovascular:      Rate and Rhythm: Normal rate and regular rhythm.      Pulses: Normal pulses.      Heart sounds: Normal heart sounds.   Pulmonary:      Effort: Pulmonary effort is normal.      Breath sounds: Normal breath sounds.   Abdominal:      General: Abdomen is flat.   Musculoskeletal:         General: Normal range of motion.      Cervical back: Normal range of motion and neck supple.   Skin:     General: Skin is warm and dry.      Capillary Refill: Capillary refill takes 2 to 3 seconds.   Neurological:      General: No focal deficit present.      Mental Status: She is alert and oriented to person, place, and time.   Psychiatric:         Mood and Affect: Mood normal.         Behavior: Behavior normal.         Thought Content: Thought content normal.       Significant Labs: All pertinent labs within the past 24 hours have been reviewed.    Significant Imaging: I have reviewed all pertinent imaging results/findings within the past 24 hours.      Assessment/Plan:      * Hyponatremia  Severe hyponatremia, Na+ 111 on admission  Possibly 2/2 hypovolemia from nausea/vomiting associated with covid-19 virus infection though worsened with IVFs - now considering SIADH vs adrenal insufficiency or other  Will order urine studies though fluid bolus given prior to collection  BMP q2h if giving 3% saline infusion or bolus  Evaluate for fluid overload   Will admit to ICU and  await recommendations from critical care team regarding 3% saline especially given hypokalemia  Attempting to get PICC placement now   Check TSH, magnesium  Stress dose steroids ordered  Improved    Hypothyroid  On home levothyroxine 88 mcg  Recheck TSH given severity of hyponatremia  Adjust dose if needed      Hypokalemia  K+ 3.0 on admission   Likely 2/2 GI losses - diarrhea  Will replace PO if tolerated  Check magnesium levels  Improved    COVID-19 virus infection  Diagnosed 1 day prior to admission  Meets criteria for Remdesivir, already given Paxlovid. Reordered but discuss with eICU MD for further recommendations i.e. use of Remdesivir instead  Patient is identified as High risk for severe complications of COVID 19 based on COVID risk score of 3   Initiate standard COVID protocols; COVID-19 testing ,Infection Control notification  and isolation- respiratory, contact and droplet per protocol    Diagnostics:   Severe hyponatremia  CBC, CMP, CRP, LDH and Portable CXR    Management: Initiate targeted therapy: continue Paxlovid unless otherwise recommended by Critical Care, Dexamethasone PO/IV 6mg daily x10 days and Anticoagulation, Patient admitted to critical care- Will initiate prophylactic dose anticoagulation, Maintain oxygen saturations 92-96% via Nasal Cannula  LPM and monitor with continuous/intermittent pulse oximetry. Inhaled bronchodilators as needed for shortness of breath. Continuous cardiac monitoring.      Diarrhea  Possibly 2/2 COVID 19 infection   Will check stool studies additionally  C diff negative  Stools culture pending  Diarrhea is resolving        VTE Risk Mitigation (From admission, onward)         Ordered     enoxaparin injection 30 mg  Daily         05/16/22 2119     Place sequential compression device  Until discontinued         05/16/22 2105     IP VTE HIGH RISK PATIENT  Once         05/16/22 2105     Place sequential compression device  Until discontinued         05/16/22 2105                 Discharge Planning   ELIZABETH:      Code Status: Full Code   Is the patient medically ready for discharge?:     Reason for patient still in hospital (select all that apply): Treatment  Discharge Plan A: Home                  Serafin Zamora MD  Department of [facility] Medicine   [facility]

## 2022-05-18 NOTE — PLAN OF CARE
05/18/22 1035   Medicare Message   Important Message from Medicare regarding Discharge Appeal Rights Given to patient/caregiver;Explained to patient/caregiver;Signed/date by patient/caregiver   Date IMM was signed 05/18/22   Time IMM was signed 1035   Patient in covid isolation. Verbal consent received.

## 2022-05-18 NOTE — PT/OT/SLP PROGRESS
Physical Therapy Treatment    Patient Name:  Selene Posey   MRN:  [id number]    Recommendations:     Discharge Recommendations:   (home with home health vs skilled placement)   Discharge Equipment Recommendations:  (assessment ongoing)   Barriers to discharge: None    Assessment:     Selene Posey is a 86 y.o. female admitted with a medical diagnosis of Hyponatremia.  She presents with the following impairments/functional limitations:  weakness, impaired endurance, impaired self care skills, impaired functional mobilty, impaired balance, impaired cardiopulmonary response to activity. Patient requires encouragement and cueing throughout treatment session for max participation.    Rehab Prognosis: Good; patient would benefit from acute skilled PT services to address these deficits and reach maximum level of function.    Recent Surgery: * No surgery found *      Plan:     During this hospitalization, patient to be seen  (3-5x/wk) to address the identified rehab impairments via gait training, therapeutic activities, therapeutic exercises and progress toward the following goals:    · Plan of Care Expires:   (upon facility discharge)    Subjective     Chief Complaint: fatigue  Patient/Family Comments/goals: patient stating she was given too much medication last night due to being unable to sleep  Pain/Comfort:  · Pain Rating 1: 0/10      Objective:     Communicated with RN prior to session. Patient initially refused PT this afternoon but agreeable after speaking with RN. Patient found HOB elevated with bed alarm, peripheral IV, pulse ox (continuous), telemetry upon PT entry to room.     General Precautions: Standard, airborne, droplet, contact, fall   Orthopedic Precautions:N/A   Braces:  NT  Respiratory Status: Room air     Functional Mobility:  · Bed Mobility:  Supine to Sit: contact guard assistance  · Sit to Supine: activity did not occur secondary to patient left sitting up in chair at completion of treatment  session  · Transfers:  Bed to Chair: contact guard assistance with  hand-held assist  using  Step Transfer      Therapeutic Activities and Exercises:  Patient performed seated LE exercise on side of bed 2 x 10 reps to include: heel/toe raises, alt knee ext, and marching. Patient performed static standing activities with CGA x 2 min f/b transfer to chair.    Patient Education Provided:              -PT roles, expectations, and POC               -Safety with mobility              -Benefits of OOB activities to increase strength and functional mobility               -Performing ther ex for increasing LE ROM and strength              -Discharge recommendations     Patient left up in chair with all lines intact, call button in reach and RN notified..    GOALS:   1. Patient to perform bed mobility with mod I  2. Patient to require CGA for transfers using LRAD  3. Patient to ambulate 100' with CGA using :RAD  4. Patient to tolerate sitting up in chair > 2 hours    Time Tracking:     PT Received On: 05/18/22  PT Start Time: 1308     PT Stop Time: 1323  PT Total Time (min): 15 min     Billable Minutes: Therapeutic Exercise 15 min    Treatment Type: Treatment  PT/PTA: PT           05/18/2022

## 2022-05-18 NOTE — PLAN OF CARE
Problem: Adult Inpatient Plan of Care  Goal: Plan of Care Review  Outcome: Ongoing, Progressing  Goal: Patient-Specific Goal (Individualized)  Outcome: Ongoing, Progressing  Goal: Absence of Hospital-Acquired Illness or Injury  Outcome: Ongoing, Progressing  Goal: Optimal Comfort and Wellbeing  Outcome: Ongoing, Progressing  Goal: Readiness for Transition of Care  Outcome: Ongoing, Progressing     Problem: Infection  Goal: Absence of Infection Signs and Symptoms  Outcome: Ongoing, Progressing     Problem: Skin Injury Risk Increased  Goal: Skin Health and Integrity  Outcome: Ongoing, Progressing     Problem: Electrolyte Imbalance  Goal: Electrolyte Balance  Outcome: Ongoing, Progressing     Problem: Activity Intolerance  Goal: Enhanced Capacity and Energy  Outcome: Ongoing, Progressing     Problem: Depression  Goal: Improved Mood  Outcome: Ongoing, Progressing     Problem: Fluid Volume Deficit  Goal: Fluid Balance  Outcome: Ongoing, Progressing     Problem: Fall Injury Risk  Goal: Absence of Fall and Fall-Related Injury  Outcome: Ongoing, Progressing

## 2022-05-18 NOTE — PT/OT/SLP PROGRESS
"Occupational Therapy   Treatment    Name: Selene Posey  MRN: [id number]  Admitting Diagnosis:  Hyponatremia       Recommendations:     Discharge Recommendations:  dc home w/ hh   Discharge Equipment Recommendations:    none as patient states she has a rw already  Barriers to discharge:    lives alone    Assessment:     Selene Posey is a 86 y.o. female with a medical diagnosis of Hyponatremia.  She presents with decreased ind w/ all adls. Performance deficits affecting function are  decreased functional activity tolerance, decreased static/dynamic stand balance, decreased bue strength.   Rehab Prognosis:  Good; patient would benefit from acute skilled OT services to address these deficits and reach maximum level of function.       Plan:     Patient to be seen   3-5x's per week to address the above listed problems via  ot intervention  · Plan of Care Expires:  upon dc   · Plan of Care Reviewed with:   patient/ case management    Subjective     Pain/Comfort:  ·  I'm not having pain today."    Objective:     Communicated with: nursing prior to session.  Patient found up in chair    upon OT entry to room.    General Precautions: Standard,     Orthopedic Precautions:  none  Braces:  none  Respiratory Status: Room air     Occupational Performance:          Functional Mobility/Transfers:  · Patient completed Sit <> Stand Transfer with contact guard assistance  with  hand-held assist   · Patient completed Bed <> Chair Transfer using Stand Pivot technique with contact guard assistance with hand-held assist  · Patient completed Toilet Transfer Stand Pivot technique with contact guard assistance with  hand-held assist      Activities of Daily Living:  · Patient performed toileting tasks w/ sba , yet bsc transfer w/ cga.         Treatment & Education:  Patient performed bue arom activities for all planes of ue movement 2 sets 15 reps all planes. Patient then stated she had to use bathroom. Patient performed chair to bsc " transfer w/ cga for optimal safety as patient c/o dizziness upon stand.  Patient performed toileting w/ sba .  Patient was able to tolerate sitting on bsc for approx 20 min as patient stated she was having trouble urinating.  Patient did have bowel movement and finally 800 cc's of urine.  Nursing notified. Patient transferred back to chair w/ cga.   Patient left up in chair with all lines intactEducation:      GOALS:   See initial ot eval    Time Tracking:     OT Date of Treatment:   0518/2022  OT Start Time:  14:08  OT Stop Time:  14:48  OT Total Time (min):  40    Billable Minutes:Self Care/Home Management 30  Therapeutic Exercise 10               5/18/2022

## 2022-05-19 VITALS
DIASTOLIC BLOOD PRESSURE: 69 MMHG | BODY MASS INDEX: 28.36 KG/M2 | TEMPERATURE: 97 F | HEIGHT: 62 IN | SYSTOLIC BLOOD PRESSURE: 146 MMHG | WEIGHT: 154.13 LBS | HEART RATE: 69 BPM | RESPIRATION RATE: 17 BRPM | OXYGEN SATURATION: 96 %

## 2022-05-19 LAB
ANION GAP SERPL CALC-SCNC: 8 MMOL/L (ref 8–16)
BASOPHILS # BLD AUTO: 0.01 K/UL (ref 0–0.2)
BASOPHILS NFR BLD: 0.1 % (ref 0–1.9)
BUN SERPL-MCNC: 9 MG/DL (ref 8–23)
CALCIUM SERPL-MCNC: 8.4 MG/DL (ref 8.7–10.5)
CHLORIDE SERPL-SCNC: 98 MMOL/L (ref 95–110)
CO2 SERPL-SCNC: 28 MMOL/L (ref 23–29)
CREAT SERPL-MCNC: 0.7 MG/DL (ref 0.5–1.4)
DIFFERENTIAL METHOD: ABNORMAL
EOSINOPHIL # BLD AUTO: 0 K/UL (ref 0–0.5)
EOSINOPHIL NFR BLD: 0.6 % (ref 0–8)
ERYTHROCYTE [DISTWIDTH] IN BLOOD BY AUTOMATED COUNT: 12.6 % (ref 11.5–14.5)
EST. GFR  (AFRICAN AMERICAN): >60 ML/MIN/1.73 M^2
EST. GFR  (NON AFRICAN AMERICAN): >60 ML/MIN/1.73 M^2
GLUCOSE SERPL-MCNC: 82 MG/DL (ref 70–110)
HCT VFR BLD AUTO: 32.7 % (ref 37–48.5)
HGB BLD-MCNC: 11.3 G/DL (ref 12–16)
IMM GRANULOCYTES # BLD AUTO: 0.03 K/UL (ref 0–0.04)
IMM GRANULOCYTES NFR BLD AUTO: 0.4 % (ref 0–0.5)
LYMPHOCYTES # BLD AUTO: 3.6 K/UL (ref 1–4.8)
LYMPHOCYTES NFR BLD: 52.6 % (ref 18–48)
MAGNESIUM SERPL-MCNC: 1.8 MG/DL (ref 1.6–2.6)
MCH RBC QN AUTO: 28.6 PG (ref 27–31)
MCHC RBC AUTO-ENTMCNC: 34.6 G/DL (ref 32–36)
MCV RBC AUTO: 83 FL (ref 82–98)
MONOCYTES # BLD AUTO: 0.8 K/UL (ref 0.3–1)
MONOCYTES NFR BLD: 11.1 % (ref 4–15)
NEUTROPHILS # BLD AUTO: 2.4 K/UL (ref 1.8–7.7)
NEUTROPHILS NFR BLD: 35.2 % (ref 38–73)
NRBC BLD-RTO: 0 /100 WBC
PLATELET # BLD AUTO: 234 K/UL (ref 150–450)
PMV BLD AUTO: 9.3 FL (ref 9.2–12.9)
POTASSIUM SERPL-SCNC: 3.3 MMOL/L (ref 3.5–5.1)
RBC # BLD AUTO: 3.95 M/UL (ref 4–5.4)
SODIUM SERPL-SCNC: 134 MMOL/L (ref 136–145)
WBC # BLD AUTO: 6.92 K/UL (ref 3.9–12.7)

## 2022-05-19 PROCEDURE — C9113 INJ PANTOPRAZOLE SODIUM, VIA: HCPCS | Performed by: HOSPITALIST

## 2022-05-19 PROCEDURE — 63600175 PHARM REV CODE 636 W HCPCS: Performed by: HOSPITALIST

## 2022-05-19 PROCEDURE — 99238 HOSP IP/OBS DSCHRG MGMT 30/<: CPT | Mod: ,,, | Performed by: HOSPITALIST

## 2022-05-19 PROCEDURE — 99900035 HC TECH TIME PER 15 MIN (STAT)

## 2022-05-19 PROCEDURE — 97116 GAIT TRAINING THERAPY: CPT

## 2022-05-19 PROCEDURE — 99238 PR HOSPITAL DISCHARGE DAY,<30 MIN: ICD-10-PCS | Mod: ,,, | Performed by: HOSPITALIST

## 2022-05-19 PROCEDURE — 83735 ASSAY OF MAGNESIUM: CPT | Performed by: HOSPITALIST

## 2022-05-19 PROCEDURE — 25000003 PHARM REV CODE 250: Performed by: HOSPITALIST

## 2022-05-19 PROCEDURE — 85025 COMPLETE CBC W/AUTO DIFF WBC: CPT | Performed by: HOSPITALIST

## 2022-05-19 PROCEDURE — 80048 BASIC METABOLIC PNL TOTAL CA: CPT | Performed by: HOSPITALIST

## 2022-05-19 PROCEDURE — 36415 COLL VENOUS BLD VENIPUNCTURE: CPT | Performed by: HOSPITALIST

## 2022-05-19 RX ORDER — POTASSIUM CHLORIDE 750 MG/1
10 TABLET, EXTENDED RELEASE ORAL 2 TIMES DAILY
Qty: 7 TABLET | Refills: 0 | Status: SHIPPED | OUTPATIENT
Start: 2022-05-19 | End: 2023-05-01

## 2022-05-19 RX ADMIN — ASPIRIN 81 MG: 81 TABLET, DELAYED RELEASE ORAL at 08:05

## 2022-05-19 RX ADMIN — FAMOTIDINE 20 MG: 10 INJECTION INTRAVENOUS at 08:05

## 2022-05-19 RX ADMIN — LEVOTHYROXINE SODIUM 88 MCG: 88 TABLET ORAL at 05:05

## 2022-05-19 RX ADMIN — DEXAMETHASONE 6 MG: 4 TABLET ORAL at 09:05

## 2022-05-19 RX ADMIN — POTASSIUM CHLORIDE 20 MEQ: 1500 TABLET, EXTENDED RELEASE ORAL at 08:05

## 2022-05-19 RX ADMIN — PANTOPRAZOLE SODIUM 40 MG: 40 INJECTION, POWDER, FOR SOLUTION INTRAVENOUS at 08:05

## 2022-05-19 NOTE — PT/OT/SLP PROGRESS
Physical Therapy Treatment    Patient Name:  Selene Posey   MRN:  [id number]    Recommendations:     Discharge Recommendations:   home with home health   Discharge Equipment Recommendations:  none   Barriers to discharge: None    Assessment:     Selene Posey is a 86 y.o. female admitted with a medical diagnosis of Hyponatremia.  She presents with the following impairments/functional limitations:  weakness, impaired endurance, impaired self care skills, impaired functional mobilty, impaired balance, impaired cardiopulmonary response to activity. Patient stating she has been getting up in the chair and going to the bathroom. She is waiting on her son to pick her up.    Rehab Prognosis: Good; patient would benefit from acute skilled PT services to address these deficits and reach maximum level of function.    Recent Surgery: * No surgery found *      Plan:     During this hospitalization, patient to be seen  (3-5x/wk) to address the identified rehab impairments via gait training, therapeutic activities, therapeutic exercises and progress toward the following goals:    · Plan of Care Expires:   (upon facility discharge)    Subjective     Chief Complaint: fatigue  Patient/Family Comments/goals: patient stating she is still very weak and fatigued but is ready to go home  Pain/Comfort:  Pain Rating 1: 0/10      Objective:     Communicated with RN prior to session. Patient found sitting on the side of the bed with upon PT entry to room.     General Precautions: Standard, airborne, droplet, contact, fall   Orthopedic Precautions:N/A   Braces:  NT  Respiratory Status: Room air     Functional Mobility:  · Bed Mobility:  Activity did not occur secondary to patient sitting up on side of bed upon PT entery  · Sit to Supine: activity did not occur secondary to patient left sitting up in chair at completion of treatment session  · Transfers:  contact guard assistance without AD  · Gait: patient ambulated from bed to chair  with CGA without AD    Therapeutic Activities and Exercises:  Patient performed seated LE exercise on side of bed 2 x 10 reps to include: heel/toe raises, alt knee ext, and marching. Patient performed static standing activities with CGA f/b transfer ambulation to chair w/o AD.    Patient Education Provided:              -PT roles, expectations, and POC               -Safety with mobility              -Benefits of OOB activities to increase strength and functional mobility               -Performing ther ex for increasing LE ROM and strength              -Discharge recommendations     Patient left up in chair with all lines intact, call button in reach and son present at bedside.    GOALS:   1. Patient to perform bed mobility with mod I  2. Patient to require CGA for transfers using LRAD  3. Patient to ambulate 100' with CGA using :RAD  4. Patient to tolerate sitting up in chair > 2 hours    Time Tracking:     PT Received On: 05/19/22  PT Start Time: 1208     PT Stop Time: 1224  PT Total Time (min): 16 min     Billable Minutes:  Gait Training 16 min    Treatment Type: Treatment  PT/PTA: PT           05/19/2022

## 2022-05-19 NOTE — PLAN OF CARE
05/19/22 1043   Final Note   Assessment Type Final Discharge Note   Anticipated Discharge Disposition Home-Health   What phone number can be called within the next 1-3 days to see how you are doing after discharge? [phone]   Hospital Resources/Appts/Education Provided Appointments scheduled and added to AVS   Post-Acute Status   Post-Acute Authorization Home Health   Home Health Status Set-up Complete/Auth obtained   Discharge Delays None known at this time   Left message on patient's phone with follow up appointments with Dr Queen for tomorrow & Dr Thibodeaux for May 27th. Nurse to provide her with written appointments. Home health set up with [facility]. No other needs at this time.

## 2022-05-19 NOTE — NURSING
IV removed TIP intact. Discharge instructions, medications, upcoming appointments reviewed with patients verbalized understanding. Tele Box returned. Belongings collected by patient. Wheeled to private car escorted by staff member

## 2022-05-19 NOTE — PLAN OF CARE
Problem: Adult Inpatient Plan of Care  Goal: Plan of Care Review  Outcome: Ongoing, Progressing     Problem: Adult Inpatient Plan of Care  Goal: Patient-Specific Goal (Individualized)  Outcome: Ongoing, Progressing     Problem: Infection  Goal: Absence of Infection Signs and Symptoms  Outcome: Ongoing, Progressing     Problem: Skin Injury Risk Increased  Goal: Skin Health and Integrity  Outcome: Ongoing, Progressing

## 2022-05-19 NOTE — PLAN OF CARE
Problem: Adult Inpatient Plan of Care  Goal: Plan of Care Review  Outcome: Adequate for Care Transition  Goal: Patient-Specific Goal (Individualized)  Outcome: Adequate for Care Transition  Goal: Absence of Hospital-Acquired Illness or Injury  Outcome: Adequate for Care Transition  Goal: Optimal Comfort and Wellbeing  Outcome: Adequate for Care Transition  Goal: Readiness for Transition of Care  Outcome: Adequate for Care Transition     Problem: Infection  Goal: Absence of Infection Signs and Symptoms  Outcome: Adequate for Care Transition     Problem: Skin Injury Risk Increased  Goal: Skin Health and Integrity  Outcome: Adequate for Care Transition     Problem: Electrolyte Imbalance  Goal: Electrolyte Balance  Outcome: Adequate for Care Transition     Problem: Activity Intolerance  Goal: Enhanced Capacity and Energy  Outcome: Adequate for Care Transition     Problem: Depression  Goal: Improved Mood  Outcome: Adequate for Care Transition     Problem: Fluid Volume Deficit  Goal: Fluid Balance  Outcome: Adequate for Care Transition     Problem: Fall Injury Risk  Goal: Absence of Fall and Fall-Related Injury  Outcome: Adequate for Care Transition

## 2022-05-20 ENCOUNTER — PATIENT OUTREACH (OUTPATIENT)
Dept: ADMINISTRATIVE | Facility: CLINIC | Age: 87
End: 2022-05-20
Payer: MEDICARE

## 2022-05-20 NOTE — PROGRESS NOTES
C3 nurse attempted to contact Selene Posey for a TCC post hospital discharge follow up call. The patient is unable to conduct the call @ this time. The patient requested a callback.    The patient has a scheduled [facility] appointment with Robbie Thibodeaux MD on 05/27/2022 @ 0800am.

## 2022-05-20 NOTE — DISCHARGE SUMMARY
[facility] Medicine  Discharge Summary      Patient Name: Selene Posey  MRN: [id number]  Admission Date: 5/16/2022  Hospital Length of Stay: 3 days  Discharge Date and Time:  05/20/2022 5:50 PM  Attending Physician: Marilyn att. providers found   Discharging Provider: Serafin Zamora MD  Primary Care Provider: Robbie Thibodeaux MD    Admitting diagnosis:    1. Hyponatremia    2. Hypothyroidism    3. Hypokalemia    4. COVID 19 viral infection    5. Diarrhea    Final diagnosis:    1. Diarrhea    2. Hyponatremia    3. Hypokalemia    4. COVID 19 viral infection    5. Hypothyroidism    Reason for hospitalization:  To treat hyponatremia and diarrhea.          HPI:  If 6-year-old female with a history of chronic kidney disease gastroesophageal reflux disease, hyperlipidemia, hypertension, proxysmal atrial fibrillation, and hypothyroidism has been having diarrhea for several days that has been getting progressively worse.  He was seen in emergency room with a sodium of 111. Her potassium was 3.0.  He tested positive for COVID.  Hospitalist team was asked to admit and treat for diarrhea and severe hyponatremia.    * No surgery found *      Hospital Course:  Patient was placed on airborne and contact isolation.  Hyponatremia was treated with IV fluids and improved by the 2nd hospital day.  She was checked for C diff and was negative.  Her diarrhea improved after dose of Lomotil.  She continued to improve with serum sodium of 134 on day of discharge.  Hypokalemia was treated with potassium chloride.  She was able to tolerate her diet well.  Did initial plan was to discharge her on 05/18/2022 but patient complained being too weak for discharge.  PT and OT were were ordered.  Patient felt Kwon on 05/19/2022 and want to be discharge.  She was told that she need to be self quarantine for 5-6 days.    Consults:   Consults (From admission, onward)        Status Ordering Provider     Inpatient consult to Registered  Dietitian/Nutritionist  Once        Provider:  (Not yet assigned)    SHADY Gracia          Final Active Diagnoses:    Diagnosis Date Noted POA    PRINCIPAL PROBLEM:  Hyponatremia [E87.1] 05/16/2022 Yes    COVID-19 virus infection [U07.1] 05/16/2022 Yes    Hypokalemia [E87.6] 05/16/2022 Yes    Hypothyroid [E03.9] 05/16/2022 Yes      Problems Resolved During this Admission:    Diagnosis Date Noted Date Resolved POA    Diarrhea [R19.7] 09/19/2013 05/19/2022 Yes      Discharged Condition: stable    [place] [facility]    [place]:   Follow-up Information     Robbie Thibodeaux MD Follow up on 5/27/2022.    Specialty: Family Medicine  Why: 11:30am for hospital follow-up appointment  Contact information:  [address] [place] [address] [phone]             Kemar Queen MD. Go on 5/20/2022.    Specialties: Cardiology, Interventional Cardiology  Why: appointment Friday May 20th at 2:10pm for hospital follow up; will be seen in [facility] clinic  Contact information:  [address] [facility] [facility] [address]  [phone]             [facility] Follow up.    Why: will provide home health services  Contact information:  [address] [place] [address]  [phone]                     Patient Instructions:      SUBSEQUENT HOME HEALTH ORDERS   Order Comments: Subsequent Home Health Orders    Current Medications:  Current Facility-Administered Medications:  0.9 % NaCl with KCl 20 mEq infusion, , Intravenous, Continuous, Shady Zamora MD, Last Rate: 50 mL/hr at 05/18/22 1531, New Bag at 05/18/22 1531  acetaminophen tablet 650 mg, 650 mg, Oral, Q4H PRN, Shady Zamora MD  acetaminophen tablet 650 mg, 650 mg, Oral, Q8H PRN, Shady Zamora MD  aspirin EC tablet 81 mg, 81 mg, Oral, Daily, Shady Zamora MD, 81 mg at 05/19/22 0822  dexAMETHasone tablet 6 mg, 6 mg, Oral, Daily, Shady Zamora MD, 6 mg at  05/19/22 0900  dextrose 10% bolus 125 mL, 12.5 g, Intravenous, PRN, Serafin Zamora MD  dextrose 10% bolus 250 mL, 25 g, Intravenous, PRN, Serafin Zamora MD  enoxaparin injection 30 mg, 30 mg, Subcutaneous, Daily, Serafin Zamora MD, 30 mg at 05/18/22 1743  EScitalopram oxalate tablet 5 mg, 5 mg, Oral, Daily, Serafin Zamora MD, 5 mg at 05/18/22 0825  famotidine (PF) injection 20 mg, 20 mg, Intravenous, BID, Serafin Zamora MD, 20 mg at 05/19/22 0823  glucagon (human recombinant) injection 1 mg, 1 mg, Intramuscular, PRN, Serafin Zamora MD  glucose chewable tablet 16 g, 16 g, Oral, PRN, Serafin Zamora MD  glucose chewable tablet 24 g, 24 g, Oral, PRN, Serafin Zamora MD  levothyroxine tablet 88 mcg, 88 mcg, Oral, Before breakfast, Serafin Zamora MD, 88 mcg at 05/19/22 0559  magnesium oxide tablet 800 mg, 800 mg, Oral, PRN, Serafin Zamora MD  magnesium oxide tablet 800 mg, 800 mg, Oral, PRN, Serafin Zamora MD  naloxone 0.4 mg/mL injection 0.02 mg, 0.02 mg, Intravenous, PRN, Serafin Zamora MD  ondansetron injection 4 mg, 4 mg, Intravenous, Q8H PRN, Serafin Zamora MD  pantoprazole injection 40 mg, 40 mg, Intravenous, Daily, Serafin Zamora MD, 40 mg at 05/19/22 0824  potassium bicarbonate disintegrating tablet 40 mEq, 40 mEq, Oral, PRN, Serafin Zamora MD  potassium bicarbonate disintegrating tablet 50 mEq, 50 mEq, Oral, PRN, Serafin Zamora MD, 50 mEq at 05/18/22 1241  potassium chloride SA CR tablet 20 mEq, 20 mEq, Oral, BID, Serafin Zamora MD, 20 mEq at 05/19/22 0825  potassium, sodium phosphates 280-160-250 mg packet 2 packet, 2 packet, Oral, PRN, Serafin Zamora MD  potassium, sodium phosphates 280-160-250 mg packet 2 packet, 2 packet, Oral, PRN, Serafin Zamora MD  potassium, sodium phosphates 280-160-250 mg packet 2 packet, 2 packet, Oral, PRN, Serafin Zamora MD  promethazine tablet 25 mg, 25 mg, Oral, Q6H PRN, Earnest C.  MD Noah  sodium chloride 0.9% flush 10 mL, 10 mL, Intravenous, PRN, Serafin Zamora MD  sodium chloride 0.9% flush 10 mL, 10 mL, Intravenous, Q8H PRN, Serafin Zamora MD  trazodone split tablet 25 mg, 25 mg, Oral, Nightly PRN, Serafin Zamora MD        Nursing:   Routine Skin for Bedridden Patients: Instruct patient/caregiver to apply moisture barrier cream to all skin folds and wet areas in perineal area daily and after baths and all bowel movements.    Diet:   regular diet    Activities:   activity as tolerated    Labs:  SN to perform labs:  CBC: Biweekly; 3week(s) and BMP: Biweekly; 3week(s) and Report Lab results to PCP.    Referrals/ Consults  Physical Therapy to evaluate and treat. Evaluate for home safety and equipment needs; Establish/upgrade home exercise program. Perform / instruct on therapeutic exercises, gait training, transfer training, and Range of Motion.  Occupational Therapy to evaluate and treat. Evaluate home environment for safety and equipment needs. Perform/Instruct on transfers, ADL training, ROM, and therapeutic exercises.  Aide to provide assistance with personal care, ADLs, and vital signs.    Home Health Aide:  Nursing Three times weekly, Physical Therapy Three times weekly, Occupational Therapy Three times weekly, and Home Health Aide Three times weekly     Order Specific Question Answer Comments   What Home Health Agency is the patient currently using? Other/External      Medications:  Reconciled Home Medications:      Medication List      START taking these medications    potassium chloride SA 10 MEQ tablet  Commonly known as: K-DUR,KLOR-CON  Take 1 tablet (10 mEq total) by mouth 2 (two) times daily.        CONTINUE taking these medications    acyclovir 400 MG tablet  Commonly known as: ZOVIRAX  Take 400 mg by mouth 2 (two) times daily as needed.     aspirin 81 MG EC tablet  Commonly known as: ECOTRIN  81 mg.     clonazePAM 0.5 MG tablet  Commonly known as: KlonoPIN      EScitalopram oxalate 5 MG Tab  Commonly known as: LEXAPRO  Take 1 tablet (5 mg total) by mouth once daily.     ezetimibe 10 mg tablet  Commonly known as: ZETIA  Take 10 mg by mouth once daily.     fluticasone propionate 50 mcg/actuation nasal spray  Commonly known as: FLONASE  SMARTSI Spray(s) Both Nares Twice Daily PRN     ipratropium 21 mcg (0.03 %) nasal spray  Commonly known as: ATROVENT  2 sprays.     levothyroxine 88 MCG tablet  Commonly known as: SYNTHROID     meloxicam 15 MG tablet  Commonly known as: MOBIC  Take 15 mg by mouth daily as needed.     metoprolol succinate 25 MG 24 hr tablet  Commonly known as: TOPROL-XL     nirmatrelvir-ritonavir 150 mg x 2- 100 mg copackaged tablets (EUA)  Take 3 tablets by mouth 2 (two) times daily for 5 days. Each dose contains 2 nirmatrelvir (pink tablets) and 1 ritonavir (white tablet). Take all 3 tablets together     * omeprazole 20 MG capsule  Commonly known as: PRILOSEC  Take 20 mg by mouth once daily.     * omeprazole 40 MG capsule  Commonly known as: PRILOSEC  TAKE ONE CAPSULE BY MOUTH EVERY MORNING ON AN EMPTY STOMACH     ondansetron 4 MG tablet  Commonly known as: ZOFRAN  Take 1 tablet (4 mg total) by mouth every 8 (eight) hours as needed for Nausea.     pantoprazole 20 MG tablet  Commonly known as: PROTONIX  Take 40 mg by mouth once daily.     predniSONE 10 MG tablet  Commonly known as: DELTASONE  Take 10 mg by mouth once daily.         * This list has 2 medication(s) that are the same as other medications prescribed for you. Read the directions carefully, and ask your doctor or other care provider to review them with you.                Significant Diagnostic Studies: Labs:   CMP   Recent Labs   Lab 22  0635   *   K 3.3*   CL 98   CO2 28   GLU 82   BUN 9   CREATININE 0.7   CALCIUM 8.4*   ANIONGAP 8   ESTGFRAFRICA >60.0   EGFRNONAA >60.0    and CBC   Recent Labs   Lab 22  0635   WBC 6.92   HGB 11.3*   HCT 32.7*          Pending  Diagnostic Studies:     Procedure Component Value Units Date/Time    Calprotectin, Stool [[id number]] Collected: 05/16/22 1757    Order Status: Sent Lab Status: In process Updated: 05/16/22 1800    Specimen: Stool     Giardia / Cryptosporidum, EIA [[id number]] Collected: 05/16/22 1757    Order Status: Sent Lab Status: In process Updated: 05/16/22 1759    Specimen: Stool     Stool Exam-Ova,Cysts,Parasites [[id number]] Collected: 05/16/22 1757    Order Status: Sent Lab Status: In process Updated: 05/16/22 1759    Specimen: Stool         Indwelling Lines/Drains at time of discharge:   Lines/Drains/Airways     None                 Time spent on the discharge of patient:22  minutes         Serfain Zamora MD  Department of [facility] Medicine  [facility]

## 2022-05-21 NOTE — PROGRESS NOTES
C3 nurse attempted to contact Selene Posey for a TCC post hospital discharge follow up call. No answer. Left voicemail with callback information.

## 2022-05-22 NOTE — PROGRESS NOTES
C3 nurse attempted to contact Selene Posey for a TCC post hospital discharge follow up call. The patient is unable to conduct the call @ this time. The patient requested a callback.

## 2022-05-23 NOTE — PROGRESS NOTES
C3 nurse attempted to contact Selene Posey for a TCC post hospital discharge follow up call. No answer. No voicemail available.The patient does have a follow up appointment with Robbie Thibodeaux MD on 05/27/2022.

## 2022-05-29 ENCOUNTER — HOSPITAL ENCOUNTER (EMERGENCY)
Facility: HOSPITAL | Age: 87
Discharge: HOME | End: 2022-05-29
Attending: INTERNAL MEDICINE
Payer: MEDICARE

## 2022-05-29 VITALS
DIASTOLIC BLOOD PRESSURE: 80 MMHG | SYSTOLIC BLOOD PRESSURE: 130 MMHG | BODY MASS INDEX: 23.9 KG/M2 | RESPIRATION RATE: 18 BRPM | WEIGHT: 140 LBS | HEIGHT: 64 IN | HEART RATE: 88 BPM | OXYGEN SATURATION: 98 % | TEMPERATURE: 99 F

## 2022-05-29 DIAGNOSIS — R31.9 URINARY TRACT INFECTION WITH HEMATURIA, SITE UNSPECIFIED: Primary | ICD-10-CM

## 2022-05-29 DIAGNOSIS — N39.0 URINARY TRACT INFECTION WITH HEMATURIA, SITE UNSPECIFIED: Primary | ICD-10-CM

## 2022-05-29 LAB
ANION GAP SERPL CALC-SCNC: 11 MMOL/L (ref 8–16)
BACTERIA #/AREA URNS HPF: ABNORMAL /HPF
BASOPHILS # BLD AUTO: 0.05 K/UL (ref 0–0.2)
BASOPHILS NFR BLD: 0.4 % (ref 0–1.9)
BILIRUB UR QL STRIP: NEGATIVE
BUN SERPL-MCNC: 10 MG/DL (ref 8–23)
CALCIUM SERPL-MCNC: 9.4 MG/DL (ref 8.7–10.5)
CHLORIDE SERPL-SCNC: 94 MMOL/L (ref 95–110)
CLARITY UR: ABNORMAL
CO2 SERPL-SCNC: 23 MMOL/L (ref 23–29)
COLOR UR: YELLOW
CREAT SERPL-MCNC: 0.7 MG/DL (ref 0.5–1.4)
DIFFERENTIAL METHOD: ABNORMAL
EOSINOPHIL # BLD AUTO: 0.3 K/UL (ref 0–0.5)
EOSINOPHIL NFR BLD: 2.3 % (ref 0–8)
ERYTHROCYTE [DISTWIDTH] IN BLOOD BY AUTOMATED COUNT: 12.9 % (ref 11.5–14.5)
EST. GFR  (AFRICAN AMERICAN): >60 ML/MIN/1.73 M^2
EST. GFR  (NON AFRICAN AMERICAN): >60 ML/MIN/1.73 M^2
GLUCOSE SERPL-MCNC: 110 MG/DL (ref 70–110)
GLUCOSE UR QL STRIP: NEGATIVE
HCT VFR BLD AUTO: 33.7 % (ref 37–48.5)
HGB BLD-MCNC: 11.3 G/DL (ref 12–16)
HGB UR QL STRIP: ABNORMAL
IMM GRANULOCYTES # BLD AUTO: 0.09 K/UL (ref 0–0.04)
IMM GRANULOCYTES NFR BLD AUTO: 0.7 % (ref 0–0.5)
KETONES UR QL STRIP: NEGATIVE
LEUKOCYTE ESTERASE UR QL STRIP: ABNORMAL
LYMPHOCYTES # BLD AUTO: 3.4 K/UL (ref 1–4.8)
LYMPHOCYTES NFR BLD: 27.4 % (ref 18–48)
MCH RBC QN AUTO: 28.9 PG (ref 27–31)
MCHC RBC AUTO-ENTMCNC: 33.5 G/DL (ref 32–36)
MCV RBC AUTO: 86 FL (ref 82–98)
MICROSCOPIC COMMENT: ABNORMAL
MONOCYTES # BLD AUTO: 1 K/UL (ref 0.3–1)
MONOCYTES NFR BLD: 8.2 % (ref 4–15)
NEUTROPHILS # BLD AUTO: 7.5 K/UL (ref 1.8–7.7)
NEUTROPHILS NFR BLD: 61 % (ref 38–73)
NITRITE UR QL STRIP: NEGATIVE
NRBC BLD-RTO: 0 /100 WBC
PH UR STRIP: 6 [PH] (ref 5–8)
PLATELET # BLD AUTO: 418 K/UL (ref 150–450)
PMV BLD AUTO: 8.4 FL (ref 9.2–12.9)
POTASSIUM SERPL-SCNC: 3.9 MMOL/L (ref 3.5–5.1)
PROT UR QL STRIP: NEGATIVE
RBC # BLD AUTO: 3.91 M/UL (ref 4–5.4)
RBC #/AREA URNS HPF: 25 /HPF (ref 0–4)
SODIUM SERPL-SCNC: 128 MMOL/L (ref 136–145)
SP GR UR STRIP: <=1.005 (ref 1–1.03)
URN SPEC COLLECT METH UR: ABNORMAL
UROBILINOGEN UR STRIP-ACNC: NEGATIVE EU/DL
WBC # BLD AUTO: 12.36 K/UL (ref 3.9–12.7)
WBC #/AREA URNS HPF: >100 /HPF (ref 0–5)

## 2022-05-29 PROCEDURE — 80048 BASIC METABOLIC PNL TOTAL CA: CPT | Performed by: NURSE PRACTITIONER

## 2022-05-29 PROCEDURE — 87088 URINE BACTERIA CULTURE: CPT | Performed by: INTERNAL MEDICINE

## 2022-05-29 PROCEDURE — 99283 EMERGENCY DEPT VISIT LOW MDM: CPT | Mod: 25

## 2022-05-29 PROCEDURE — 81000 URINALYSIS NONAUTO W/SCOPE: CPT | Performed by: INTERNAL MEDICINE

## 2022-05-29 PROCEDURE — 87077 CULTURE AEROBIC IDENTIFY: CPT | Performed by: INTERNAL MEDICINE

## 2022-05-29 PROCEDURE — 85025 COMPLETE CBC W/AUTO DIFF WBC: CPT | Performed by: NURSE PRACTITIONER

## 2022-05-29 PROCEDURE — 87186 SC STD MICRODIL/AGAR DIL: CPT | Performed by: INTERNAL MEDICINE

## 2022-05-29 PROCEDURE — 87086 URINE CULTURE/COLONY COUNT: CPT | Performed by: INTERNAL MEDICINE

## 2022-05-29 RX ORDER — CIPROFLOXACIN 250 MG/1
250 TABLET, FILM COATED ORAL EVERY 12 HOURS
Qty: 6 TABLET | Refills: 0 | Status: SHIPPED | OUTPATIENT
Start: 2022-05-29 | End: 2022-06-01

## 2022-05-29 NOTE — ED NOTES
Pt resting quietly, son at bedside. Denies any pain or needs at this time. Will continue to monitor.

## 2022-05-29 NOTE — ED PROVIDER NOTES
Encounter Date: 5/29/2022       History     Chief Complaint   Patient presents with    Dysuria     X 4 days     86-year-old female presents with dysuria x4 days.  She denies any back pain, hematuria, increased urinary frequency.  She was recently hospitalized with COVID-19 and hyponatremia at 111 on the May 16th 2022. She denies any fever or body aches.  She denies any shortness of breath or chest pain.  She does state that she does cough up phlegm from time to time.  She denies any sinus tenderness or headaches.  She states that she has been taking Mucinex and Allegra for her lingering upper respiratory symptoms.          Review of patient's allergies indicates:   Allergen Reactions    Azithromycin     Codeine     Erythropoietin analogues     Pcn [penicillins]     Sulfa (sulfonamide antibiotics)     Fenofibrate      Made patient hoarse.     Past Medical History:   Diagnosis Date    Bronchiectasis     CKD (chronic kidney disease)     GERD (gastroesophageal reflux disease)     High cholesterol     Hypertension     Paroxysmal atrial fibrillation     Thyroid disease      Past Surgical History:   Procedure Laterality Date    APPENDECTOMY      HYSTERECTOMY       Family History   Problem Relation Age of Onset    Breast cancer Mother 65     Social History     Tobacco Use    Smoking status: Never Smoker    Smokeless tobacco: Never Used   Substance Use Topics    Alcohol use: No     Review of Systems   Constitutional: Negative for chills and fever.        Weakness   HENT: Positive for congestion.    Respiratory: Positive for cough. Negative for chest tightness and shortness of breath.    Cardiovascular: Negative for chest pain.   Gastrointestinal: Negative for abdominal distention, abdominal pain, constipation, diarrhea, nausea and vomiting.   Genitourinary: Positive for dysuria.   Musculoskeletal: Negative for arthralgias.   Skin: Negative for rash.   Neurological: Negative.  Negative for headaches.    Hematological: Negative.    Psychiatric/Behavioral: Negative.    All other systems reviewed and are negative.      Physical Exam     Initial Vitals   BP Pulse Resp Temp SpO2   05/29/22 1035 05/29/22 1033 05/29/22 1033 05/29/22 1033 05/29/22 1033   126/75 81 20 98 °F (36.7 °C) 99 %      MAP       --                Physical Exam    Nursing note and vitals reviewed.  Constitutional: She appears well-developed and well-nourished. She is not diaphoretic.   HENT:   Head: Normocephalic and atraumatic.   Right Ear: External ear normal.   Left Ear: External ear normal.   Eyes: Pupils are equal, round, and reactive to light. Right eye exhibits no discharge. Left eye exhibits no discharge. No scleral icterus.   Neck:   Normal range of motion.  Cardiovascular: Normal rate, regular rhythm and normal heart sounds. Exam reveals no gallop and no friction rub.    No murmur heard.  Pulmonary/Chest: Breath sounds normal. No respiratory distress. She has no wheezes. She has no rales.   Abdominal: Abdomen is soft. She exhibits no distension.   No abdominal tenderness   Musculoskeletal:      Cervical back: Normal range of motion.     Neurological: She is alert and oriented to person, place, and time. She has normal strength. GCS score is 15. GCS eye subscore is 4. GCS verbal subscore is 5. GCS motor subscore is 6.   Skin: Skin is warm and dry.   Psychiatric: She has a normal mood and affect.         ED Course   Procedures  Labs Reviewed   URINALYSIS, REFLEX TO URINE CULTURE - Abnormal; Notable for the following components:       Result Value    Appearance, UA Hazy (*)     Occult Blood UA 2+ (*)     Leukocytes, UA 3+ (*)     All other components within normal limits    Narrative:     Preferred Collection Type->Urine, Clean Catch  Specimen Source->Urine   URINALYSIS MICROSCOPIC - Abnormal; Notable for the following components:    RBC, UA 25 (*)     WBC, UA >100 (*)     Bacteria Many (*)     All other components within normal limits     Narrative:     Preferred Collection Type->Urine, Clean Catch  Specimen Source->Urine   BASIC METABOLIC PANEL - Abnormal; Notable for the following components:    Sodium 128 (*)     Chloride 94 (*)     All other components within normal limits   CBC W/ AUTO DIFFERENTIAL - Abnormal; Notable for the following components:    RBC 3.91 (*)     Hemoglobin 11.3 (*)     Hematocrit 33.7 (*)     MPV 8.4 (*)     Immature Granulocytes 0.7 (*)     Immature Grans (Abs) 0.09 (*)     All other components within normal limits   CULTURE, URINE          Imaging Results    None          Medications - No data to display  Medical Decision Making:   Differential Diagnosis:   Urinary tract infection, pyelonephritis, dehydration, COVID-19, acute hyponatremia  ED Management:  86-year-old female presents with dysuria x4 days.  She was recently hospitalized for COVID-19 and hyponatremia at 110. No adventitious sounds on lung exam.  She denies any shortness of breath or chest pain.  She has been home resting after recovering from COVID-19.  She has not followed-up with her primary care provider yet from last hospitalization.  Urinalysis is concerning for UTI. Dr. Barfield advised for her to be put on ciprofloxacin.  Sodium is 128. Discussed sodium level with Dr. Barfield.  He advised for no further evaluation of the sodiume at this time as it is a chronic issue. Dr. Barfield advised for her to follow-up close with PCP.  She is neurologically intact.  She denies any dizziness. Ambulating without difficulty in exam room. Return to the emergency room for any fever, not tolerating fluids, not tolerating medication, any worsening concerns or symptoms.               ED Course as of 05/29/22 1219   Sun May 29, 2022   1129 CBC auto differential(!) [PW]   1129 WBC, UA(!): >100 [PW]   1129 RBC, UA(!): 25 [PW]   1129 Urinalysis, Reflex to Urine Culture Urine, Clean Catch(!) [PW]   1148 Basic metabolic panel(!) [PW]      ED Course User Index  [PW] Oscar  MD Lico             Clinical Impression:   Final diagnoses:  [N39.0, R31.9] Urinary tract infection with hematuria, site unspecified (Primary)          ED Disposition Condition    Discharge Stable        ED Prescriptions     Medication Sig Dispense Start Date End Date Auth. Provider    ciprofloxacin HCl (CIPRO) 250 MG tablet Take 1 tablet (250 mg total) by mouth every 12 (twelve) hours. for 3 days 6 tablet 5/29/2022 6/1/2022 Loli Castro NP        Follow-up Information     Follow up With Specialties Details Why Contact Info    Robbie Thibodeaux MD Family Medicine In 2 days  [address] [place] [address] [phone]      [facility] Emergency Dept Emergency Medicine  As needed, If symptoms worsen [address] [facility] [address] [phone]           Loli Castro NP  05/29/22 1223       Loli Castro NP  05/29/22 1226       Loli Castro NP  05/29/22 1227

## 2022-05-31 LAB — BACTERIA UR CULT: ABNORMAL

## 2022-05-31 NOTE — PROGRESS NOTES
C3 nurse attempted to contact Selene Posey for a [facility] post hospital discharge follow up call. No answer. No voicemail available.The patient does not have a scheduled HOSFU appointment. NON-[facility] PCP.

## 2022-06-03 ENCOUNTER — EXTERNAL HOME HEALTH (OUTPATIENT)
Dept: HOME HEALTH SERVICES | Facility: HOSPITAL | Age: 87
End: 2022-06-03
Payer: MEDICARE

## 2022-09-21 ENCOUNTER — HOSPITAL ENCOUNTER (OUTPATIENT)
Dept: RADIOLOGY | Facility: HOSPITAL | Age: 87
Discharge: HOME | End: 2022-09-21
Attending: NURSE PRACTITIONER
Payer: MEDICARE

## 2022-09-21 ENCOUNTER — TELEPHONE (OUTPATIENT)
Dept: PULMONOLOGY | Facility: CLINIC | Age: 87
End: 2022-09-21

## 2022-09-21 ENCOUNTER — OFFICE VISIT (OUTPATIENT)
Dept: PULMONOLOGY | Facility: CLINIC | Age: 87
End: 2022-09-21
Payer: MEDICARE

## 2022-09-21 VITALS
DIASTOLIC BLOOD PRESSURE: 70 MMHG | HEART RATE: 65 BPM | WEIGHT: 136 LBS | BODY MASS INDEX: 23.22 KG/M2 | HEIGHT: 64 IN | OXYGEN SATURATION: 97 % | SYSTOLIC BLOOD PRESSURE: 120 MMHG

## 2022-09-21 DIAGNOSIS — R53.83 FATIGUE, UNSPECIFIED TYPE: ICD-10-CM

## 2022-09-21 DIAGNOSIS — E87.1 HYPONATREMIA: ICD-10-CM

## 2022-09-21 DIAGNOSIS — Z86.16 HISTORY OF COVID-19: ICD-10-CM

## 2022-09-21 DIAGNOSIS — J47.9 BRONCHIECTASIS WITHOUT COMPLICATION: Primary | ICD-10-CM

## 2022-09-21 DIAGNOSIS — K21.9 GASTROESOPHAGEAL REFLUX DISEASE WITHOUT ESOPHAGITIS: ICD-10-CM

## 2022-09-21 PROCEDURE — 99213 OFFICE O/P EST LOW 20 MIN: CPT | Mod: S$GLB,,, | Performed by: NURSE PRACTITIONER

## 2022-09-21 PROCEDURE — 99213 PR OFFICE/OUTPT VISIT, EST, LEVL III, 20-29 MIN: ICD-10-PCS | Mod: S$GLB,,, | Performed by: NURSE PRACTITIONER

## 2022-09-21 PROCEDURE — 71046 X-RAY EXAM CHEST 2 VIEWS: CPT | Mod: TC

## 2022-09-21 RX ORDER — METOPROLOL TARTRATE 25 MG/1
25 TABLET, FILM COATED ORAL
COMMUNITY
Start: 2022-06-06 | End: 2023-05-01

## 2022-09-21 NOTE — PROGRESS NOTES
SUBJECTIVE:    Patient ID: Selene Posey is a 87 y.o. female.    Chief Complaint: Follow-up    Patient here today feeling fatigued today.   She is using her Acapella at night and it helps her bring up mucous.  She states she had Covid in May and was admitted to hospital for 5 days because her Sodium was low and severe diarrhea.   She was severely hyponatremic when she was admitted in May with Covid initial sodium level was 111, by time of discharge she was 128.  Chest xray done while in the hospital had findings consistent with mild CHF.  She denies dyspnea. She states she has felt fatigued since having Covid.  She is still working as a  because she loves what she does.     Past Medical History:   Diagnosis Date    Bronchiectasis     CKD (chronic kidney disease)     GERD (gastroesophageal reflux disease)     High cholesterol     Hypertension     Paroxysmal atrial fibrillation     Thyroid disease      Past Surgical History:   Procedure Laterality Date    APPENDECTOMY      HYSTERECTOMY       Family History   Problem Relation Age of Onset    Breast cancer Mother 65        Social History:   Marital Status:   Occupation:   Alcohol History:  reports no history of alcohol use.  Tobacco History:  reports that she has never smoked. She has never used smokeless tobacco.  Drug History:  has no history on file for drug use.    Review of patient's allergies indicates:   Allergen Reactions    Azithromycin     Codeine     Erythropoietin analogues     Pcn [penicillins]     Sulfa (sulfonamide antibiotics)     Fenofibrate      Made patient hoarse.       Current Outpatient Medications   Medication Sig Dispense Refill    acyclovir (ZOVIRAX) 400 MG tablet Take 400 mg by mouth 2 (two) times daily as needed.      aspirin (ECOTRIN) 81 MG EC tablet 81 mg.      clonazePAM (KLONOPIN) 0.5 MG tablet       ezetimibe (ZETIA) 10 mg tablet Take 10 mg by mouth once daily.      fluticasone propionate  (FLONASE) 50 mcg/actuation nasal spray SMARTSI Spray(s) Both Nares Twice Daily PRN      ipratropium (ATROVENT) 21 mcg (0.03 %) nasal spray 2 sprays.      levothyroxine (SYNTHROID) 88 MCG tablet       metoprolol tartrate (LOPRESSOR) 25 MG tablet 25 mg.      omeprazole (PRILOSEC) 40 MG capsule TAKE ONE CAPSULE BY MOUTH EVERY MORNING ON AN EMPTY STOMACH      EScitalopram oxalate (LEXAPRO) 5 MG Tab Take 1 tablet (5 mg total) by mouth once daily. (Patient not taking: Reported on 2022) 30 tablet 6    meloxicam (MOBIC) 15 MG tablet Take 15 mg by mouth daily as needed.      metoprolol succinate (TOPROL-XL) 25 MG 24 hr tablet       omeprazole (PRILOSEC) 20 MG capsule Take 20 mg by mouth once daily.      ondansetron (ZOFRAN) 4 MG tablet Take 1 tablet (4 mg total) by mouth every 8 (eight) hours as needed for Nausea. (Patient not taking: Reported on 2022) 12 tablet 0    pantoprazole (PROTONIX) 20 MG tablet Take 40 mg by mouth once daily.      potassium chloride SA (K-DUR,KLOR-CON) 10 MEQ tablet Take 1 tablet (10 mEq total) by mouth 2 (two) times daily. (Patient not taking: Reported on 2022) 7 tablet 0    predniSONE (DELTASONE) 10 MG tablet Take 10 mg by mouth once daily.       No current facility-administered medications for this visit.     PFT  no obstruction, mild restriction, moderate diffusion defect.   Last chest xray 2022  Impression:     Findings consistent with mild congestive heart failure      General: Feeling Well. Feels fatigued today, no fever   Eyes: Vision is good.  ENT:  ears feel full today   Heart::  palpitations.  Lungs: expectorates mucous at night with acapella, no shortness of breath   GI: GERD, dysphagia at times  : No dysuria, hesitancy, or nocturia.  Musculoskeletal: No joint pain or myalgias.  Skin: No lesions or rashes.  Neuro: occasional headaches   Lymph: legs swell if she stands on them all day   Psych: anxiety   Endo: weight gain     OBJECTIVE:      /70 (BP  "Location: Left arm, Patient Position: Sitting, BP Method: Medium (Manual))   Pulse 65   Ht 5' 4" (1.626 m)   Wt 61.7 kg (136 lb)   SpO2 97%   BMI 23.34 kg/m²     Physical Exam  GENERAL: Older patient in no distress.  HEENT: Pupils equal and reactive. Extraocular movements intact. Nose intact.      Pharynx moist. Good light reflex to bilateral ears with no effusions  NECK: Supple.   HEART: Regular rate and rhythm. No murmur or gallop auscultated.  LUNGS: faint crackles to bases posteriorly   ABDOMEN: Bowel sounds present. Non-tender, no masses palpated.  EXTREMITIES: Normal muscle tone and joint movement, no cyanosis or clubbing.   LYMPHATICS: No adenopathy palpated, trace edema to legs   SKIN: Dry, intact, no lesions.   NEURO: Cranial nerves II-XII intact. Motor strength 5/5 bilaterally, upper and lower extremities.  PSYCH: Appropriate affect.    Assessment:       1. Fatigue, unspecified type    2. Gastroesophageal reflux disease without esophagitis    3. Bronchiectasis without complication    4. History of COVID-19    5. Hyponatremia            Plan:       Fatigue, unspecified type  -     CBC auto differential; Future; Expected date: 09/21/2022  -     Comprehensive Metabolic Panel; Future; Expected date: 09/21/2022  -     X-Ray Chest PA And Lateral; Future    Gastroesophageal reflux disease without esophagitis    Bronchiectasis without complication    History of COVID-19    Hyponatremia         Continue to use your acapella twice a day  Cbc, CMP, chest xray         Follow up in about 6 months (around 3/21/2023).                    "

## 2022-09-21 NOTE — TELEPHONE ENCOUNTER
Cbc anemia stable      CMP sodium 131 was severely hyponatremic with covid. At time of discharge in May her sodium was 128.

## 2022-09-21 NOTE — TELEPHONE ENCOUNTER
Chest xray  IMPRESSION:  No acute pulmonary process     Mild increase in interstitial markings suggestive of chronic changes

## 2022-09-22 NOTE — TELEPHONE ENCOUNTER
Spoke with the patient reviewed labs and chest xray.  She is not short of breath. She denies headaches, no seizures.  Will forward her labs to Dr. Thibodeaux her PCP.

## 2022-09-27 LAB — BMD RECOMMENDATION EXT: NORMAL

## 2022-12-16 ENCOUNTER — OFFICE VISIT (OUTPATIENT)
Dept: URGENT CARE | Facility: CLINIC | Age: 87
End: 2022-12-16
Payer: MEDICARE

## 2022-12-16 VITALS
DIASTOLIC BLOOD PRESSURE: 80 MMHG | OXYGEN SATURATION: 99 % | BODY MASS INDEX: 23.9 KG/M2 | HEIGHT: 64 IN | WEIGHT: 140 LBS | SYSTOLIC BLOOD PRESSURE: 124 MMHG | TEMPERATURE: 98 F | HEART RATE: 77 BPM

## 2022-12-16 DIAGNOSIS — S16.1XXA STRAIN OF NECK MUSCLE, INITIAL ENCOUNTER: Primary | ICD-10-CM

## 2022-12-16 PROCEDURE — 99214 PR OFFICE/OUTPT VISIT, EST, LEVL IV, 30-39 MIN: ICD-10-PCS | Mod: S$GLB,,, | Performed by: NURSE PRACTITIONER

## 2022-12-16 PROCEDURE — 99214 OFFICE O/P EST MOD 30 MIN: CPT | Mod: S$GLB,,, | Performed by: NURSE PRACTITIONER

## 2022-12-16 RX ORDER — FLUTICASONE PROPIONATE 50 MCG
1 SPRAY, SUSPENSION (ML) NASAL DAILY
Qty: 18.2 ML | Refills: 0 | Status: SHIPPED | OUTPATIENT
Start: 2022-12-16 | End: 2022-12-30

## 2022-12-16 RX ORDER — CYCLOBENZAPRINE HCL 10 MG
10 TABLET ORAL 3 TIMES DAILY PRN
Qty: 12 TABLET | Refills: 0 | Status: SHIPPED | OUTPATIENT
Start: 2022-12-16 | End: 2022-12-20

## 2022-12-16 NOTE — PROGRESS NOTES
"Subjective:       Patient ID: Selene Posey is a 87 y.o. female.    Vitals:  height is 5' 4" (1.626 m) and weight is 63.5 kg (140 lb). Her oral temperature is 97.8 °F (36.6 °C). Her blood pressure is 124/80 and her pulse is 77. Her oxygen saturation is 99%.     Chief Complaint: Neck Pain    This is a 87 y.o. female who presents today with a chief complaint of  neck pain x 7 months ago. Patient states the neck pain started when patient as positive in May 2022 for Covid. Patient also requesting med refill on Fluticasone nasal spray.    Neck Pain   This is a recurrent problem. The current episode started more than 1 month ago. The problem occurs constantly. The problem has been unchanged. The pain is associated with an unknown factor. The pain is present in the right side. The quality of the pain is described as aching. The pain is at a severity of 8/10. The pain is moderate. The symptoms are aggravated by position and twisting. She has tried neck support (heating pad) for the symptoms. The treatment provided no relief.     Neck: Positive for neck pain.     Objective:      Physical Exam   Constitutional: She is oriented to person, place, and time. She is cooperative. normalawake  HENT:   Head: Normocephalic and atraumatic.   Ears:   Right Ear: Tympanic membrane, external ear and ear canal normal.   Left Ear: Tympanic membrane, external ear and ear canal normal.   Nose: Nose normal.   Mouth/Throat: Uvula is midline, oropharynx is clear and moist and mucous membranes are normal. Mucous membranes are moist. Oropharynx is clear.   Eyes: Conjunctivae and lids are normal. Pupils are equal, round, and reactive to light. Extraocular movement intact   Neck: Trachea normal and phonation normal.      Comments: + spasm right cervical paraspinal muscle. muscular tenderness present.   Cardiovascular: Normal rate, regular rhythm, normal heart sounds and normal pulses.   Pulmonary/Chest: Effort normal and breath sounds normal. "   Abdominal: Normal appearance.   Musculoskeletal: Normal range of motion.         General: Tenderness present. Normal range of motion.   Lymphadenopathy:     She has no cervical adenopathy.        Right cervical: No superficial cervical, no deep cervical and no posterior cervical adenopathy present.       Left cervical: No superficial cervical, no deep cervical and no posterior cervical adenopathy present.   Neurological: no focal deficit. She is alert and oriented to person, place, and time.   Skin: Skin is warm, dry and intact.   Psychiatric: Her behavior is normal. Mood normal.   Vitals reviewed.      Assessment:       1. Strain of neck muscle, initial encounter          Plan:         Strain of neck muscle, initial encounter  -     cyclobenzaprine (FLEXERIL) 10 MG tablet; Take 1 tablet (10 mg total) by mouth 3 (three) times daily as needed for Muscle spasms.  Dispense: 12 tablet; Refill: 0  -     fluticasone propionate (FLONASE) 50 mcg/actuation nasal spray; 1 spray (50 mcg total) by Each Nostril route once daily. for 14 days  Dispense: 18.2 mL; Refill: 0

## 2022-12-19 LAB
CHOLEST SERPL-MSCNC: 214 MG/DL (ref 0–200)
HDLC SERPL-MCNC: 51 MG/DL
LDLC SERPL CALC-MCNC: 146 MG/DL
TRIGL SERPL-MCNC: 157 MG/DL

## 2023-02-28 ENCOUNTER — OFFICE VISIT (OUTPATIENT)
Dept: URGENT CARE | Facility: CLINIC | Age: 88
End: 2023-02-28
Payer: MEDICARE

## 2023-02-28 VITALS
TEMPERATURE: 98 F | SYSTOLIC BLOOD PRESSURE: 146 MMHG | DIASTOLIC BLOOD PRESSURE: 62 MMHG | OXYGEN SATURATION: 96 % | WEIGHT: 136 LBS | RESPIRATION RATE: 18 BRPM | HEART RATE: 63 BPM | HEIGHT: 64 IN | BODY MASS INDEX: 23.22 KG/M2

## 2023-02-28 DIAGNOSIS — R05.9 COUGH, UNSPECIFIED TYPE: Primary | ICD-10-CM

## 2023-02-28 DIAGNOSIS — R11.0 NAUSEA: ICD-10-CM

## 2023-02-28 DIAGNOSIS — U07.1 COVID: ICD-10-CM

## 2023-02-28 LAB
CTP QC/QA: YES
SARS-COV-2 AG RESP QL IA.RAPID: POSITIVE

## 2023-02-28 PROCEDURE — 87811 SARS-COV-2 COVID19 W/OPTIC: CPT | Mod: QW,CR,S$GLB,

## 2023-02-28 PROCEDURE — 87811 SARS CORONAVIRUS 2 ANTIGEN POCT, MANUAL READ: ICD-10-PCS | Mod: QW,CR,S$GLB,

## 2023-02-28 PROCEDURE — 99214 OFFICE O/P EST MOD 30 MIN: CPT | Mod: CR,S$GLB,,

## 2023-02-28 PROCEDURE — 99214 PR OFFICE/OUTPT VISIT, EST, LEVL IV, 30-39 MIN: ICD-10-PCS | Mod: CR,S$GLB,,

## 2023-02-28 RX ORDER — ONDANSETRON 4 MG/1
4 TABLET, ORALLY DISINTEGRATING ORAL ONCE
Qty: 1 TABLET | Refills: 0 | Status: SHIPPED | OUTPATIENT
Start: 2023-02-28 | End: 2023-02-28

## 2023-02-28 NOTE — PROGRESS NOTES
"Subjective:       Patient ID: Selene Posey is a 87 y.o. female.    Vitals:  height is 5' 4" (1.626 m) and weight is 61.7 kg (136 lb). Her oral temperature is 97.8 °F (36.6 °C). Her blood pressure is 146/62 (abnormal) and her pulse is 63. Her respiration is 18 and oxygen saturation is 96%.     Chief Complaint: Cough    This is a 87 y.o. female who presents today with a chief complaint of  Patient presents with:  Cough, chest congestion, nausea, and nasal congestion x 4 days. Patient denied having fever.         Cough  This is a new problem. The current episode started in the past 7 days. The problem has been gradually improving. The cough is Productive of sputum. Associated symptoms include chills and nasal congestion. Nothing aggravates the symptoms. Her past medical history is significant for bronchitis.     Constitution: Positive for chills.   HENT:  Positive for congestion.    Neck: neck negative.   Cardiovascular: Negative.    Eyes: Negative.    Respiratory:  Positive for cough.    Gastrointestinal:  Positive for nausea.   Genitourinary: Negative.    Musculoskeletal: Negative.    Allergic/Immunologic: Negative.    Neurological: Negative.    Hematologic/Lymphatic: Negative.    Psychiatric/Behavioral: Negative.           Objective:      Physical Exam   Constitutional: normal  HENT:   Head: Normocephalic and atraumatic.   Ears:   Right Ear: Tympanic membrane, external ear and ear canal normal.   Left Ear: Tympanic membrane, external ear and ear canal normal.   Nose: Congestion present.   Mouth/Throat: Mucous membranes are moist. Oropharynx is clear.   Eyes: Conjunctivae are normal. Pupils are equal, round, and reactive to light. Extraocular movement intact   Neck: Neck supple.   Cardiovascular: Regular rhythm, normal heart sounds and normal pulses.   Pulmonary/Chest: Effort normal and breath sounds normal.         Comments: Positive for cough    Abdominal: Normal appearance. Soft.   Musculoskeletal: Normal " range of motion.         General: Normal range of motion.   Neurological: no focal deficit. She is alert.   Skin: Skin is warm. Capillary refill takes less than 2 seconds.   Psychiatric: Her behavior is normal. Mood and thought content normal.   Nursing note and vitals reviewed.      Past medical history and current medications reviewed.       Assessment:           1. Cough, unspecified type    2. COVID    3. Nausea              Plan:         Cough, unspecified type  -     SARS Coronavirus 2 Antigen, POCT Manual Read    COVID    Nausea  -     ondansetron (ZOFRAN-ODT) 4 MG TbDL; Take 1 tablet (4 mg total) by mouth once. for 1 dose  Dispense: 1 tablet; Refill: 0             Patient Instructions   Please return here or go to the Emergency Department for any concerns or worsening of condition.  Please drink plenty of fluids.  Please get plenty of rest.  If you do not have Hypertension or any history of palpitations, it is ok to take over the counter Sudafed or Mucinex D or Allegra-D or Claritin-D or Zyrtec-D.  If you do take one of the above, it is ok to combine that with plain over the counter Mucinex or Allegra or Claritin or Zyrtec.  If for example you are taking Zyrtec -D, you can combine that with Mucinex, but not Mucinex-D.  If you are taking Mucinex-D, you can combine that with plain Allegra or Claritin or Zyrtec.   If you do have Hypertension or palpitations, it is safe to take Coricidin HBP for relief of sinus symptoms.  If not allergic, please take over the counter Tylenol (Acetaminophen) and/or Motrin (Ibuprofen) as directed for control of pain and/or fever.  Please follow up with your primary care doctor or specialist as needed.    If you  smoke, please stop smoking.    Your test was POSITIVE for COVID-19 (coronavirus).       Please isolate yourself at home.  You may leave home and/or return to work once the following conditions are met:    If you were not hospitalized and are not moderately to severely  immunocompromised:   More than 5 days since symptoms first appeared AND  More than 24 hours fever free without medications AND  Symptoms are improving  Continue to wear a mask around others for 5 additional days.    If you were hospitalized OR are moderately to severely immunocompromised:  More than 20 days since symptoms first appeared  More than 24 hours fever free without medications  Symptoms have improved    If you had no symptoms but tested positive:  More than 5 days since the date of the first positive test (20 days if moderately to severely immunocompromised). If you develop symptoms, then use the guidelines above.  Continue to wear a mask around others for 5 additional days.

## 2023-03-23 ENCOUNTER — OFFICE VISIT (OUTPATIENT)
Dept: PULMONOLOGY | Facility: CLINIC | Age: 88
End: 2023-03-23
Payer: MEDICARE

## 2023-03-23 VITALS
DIASTOLIC BLOOD PRESSURE: 70 MMHG | BODY MASS INDEX: 23.56 KG/M2 | HEART RATE: 70 BPM | SYSTOLIC BLOOD PRESSURE: 120 MMHG | HEIGHT: 64 IN | OXYGEN SATURATION: 97 % | WEIGHT: 138 LBS

## 2023-03-23 DIAGNOSIS — J47.9 BRONCHIECTASIS WITHOUT COMPLICATION: Primary | ICD-10-CM

## 2023-03-23 PROCEDURE — 99213 PR OFFICE/OUTPT VISIT, EST, LEVL III, 20-29 MIN: ICD-10-PCS | Mod: S$GLB,,, | Performed by: NURSE PRACTITIONER

## 2023-03-23 PROCEDURE — 99213 OFFICE O/P EST LOW 20 MIN: CPT | Mod: S$GLB,,, | Performed by: NURSE PRACTITIONER

## 2023-03-23 NOTE — PROGRESS NOTES
SUBJECTIVE:    Patient ID: Selene Posey is a 87 y.o. female.    Chief Complaint: Follow-up (Pt had COVID again 2023)    Patient here today feeling alright. She states she is just always fatigued. She had Covid again in Feb with no complications. She states she has been told she likely has TONYA but she is no interested in being tested for it or treating it.  She still works as a  and is very active. She uses the Acapella to help with bronchiectasis and will take mucinex as well at times.  She does suffer with Reflux sleep elevated.       Past Medical History:   Diagnosis Date    Bronchiectasis     CKD (chronic kidney disease)     GERD (gastroesophageal reflux disease)     High cholesterol     Hypertension     Paroxysmal atrial fibrillation     Thyroid disease      Past Surgical History:   Procedure Laterality Date    APPENDECTOMY      HYSTERECTOMY       Family History   Problem Relation Age of Onset    Breast cancer Mother 65        Social History:   Marital Status:   Occupation:   Alcohol History:  reports no history of alcohol use.  Tobacco History:  reports that she has never smoked. She has never used smokeless tobacco.  Drug History:  has no history on file for drug use.    Review of patient's allergies indicates:   Allergen Reactions    Azithromycin     Codeine     Erythropoietin analogues     Pcn [penicillins]     Sulfa (sulfonamide antibiotics)     Fenofibrate      Made patient hoarse.       Current Outpatient Medications   Medication Sig Dispense Refill    aspirin (ECOTRIN) 81 MG EC tablet 81 mg.      clonazePAM (KLONOPIN) 0.5 MG tablet       ezetimibe (ZETIA) 10 mg tablet Take 10 mg by mouth once daily.      fluticasone propionate (FLONASE) 50 mcg/actuation nasal spray SMARTSI Spray(s) Both Nares Twice Daily PRN      ipratropium (ATROVENT) 21 mcg (0.03 %) nasal spray 2 sprays.      levothyroxine (SYNTHROID) 88 MCG tablet       meloxicam (MOBIC) 15 MG  tablet Take 15 mg by mouth daily as needed.      metoprolol succinate (TOPROL-XL) 25 MG 24 hr tablet       omeprazole (PRILOSEC) 20 MG capsule Take 20 mg by mouth once daily.      omeprazole (PRILOSEC) 40 MG capsule TAKE ONE CAPSULE BY MOUTH EVERY MORNING ON AN EMPTY STOMACH      pantoprazole (PROTONIX) 20 MG tablet Take 40 mg by mouth once daily.      acyclovir (ZOVIRAX) 400 MG tablet Take 400 mg by mouth 2 (two) times daily as needed.      EScitalopram oxalate (LEXAPRO) 5 MG Tab Take 1 tablet (5 mg total) by mouth once daily. (Patient not taking: Reported on 9/21/2022) 30 tablet 6    metoprolol tartrate (LOPRESSOR) 25 MG tablet 25 mg.      ondansetron (ZOFRAN) 4 MG tablet Take 1 tablet (4 mg total) by mouth every 8 (eight) hours as needed for Nausea. (Patient not taking: Reported on 9/21/2022) 12 tablet 0    potassium chloride SA (K-DUR,KLOR-CON) 10 MEQ tablet Take 1 tablet (10 mEq total) by mouth 2 (two) times daily. (Patient not taking: Reported on 9/21/2022) 7 tablet 0    predniSONE (DELTASONE) 10 MG tablet Take 10 mg by mouth once daily.       No current facility-administered medications for this visit.     PFT 2021 no obstruction, mild restriction, moderate diffusion defect.     Chest xray 09/2022  IMPRESSION:  No acute pulmonary process     Mild increase in interstitial markings suggestive of chronic changes      Last ct of chest 03/2021  IMPRESSION:     1. Mild bilateral lower lobe bronchiectasis.  2. 5 mm left midlung nodule unchanged since 2016 and presumably  benign.  3. Coronary artery calcifications.     General: Feeling Well.  fatigue    Eyes: Vision is good.  ENT:  no complaints   Heart::  palpitations.  Lungs: expectorates mucous at night with acapella, no shortness of breath   GI: GERD, dysphagia at times  : No dysuria, hesitancy, or nocturia.  Musculoskeletal: chronic neck discomfort   Skin: No lesions or rashes.  Neuro: occasional headaches   Lymph: legs swell during the day  Psych: anxiety  "  Endo: weight stable     OBJECTIVE:      /70 (BP Location: Left arm, Patient Position: Sitting, BP Method: Medium (Manual))   Pulse 70   Ht 5' 4" (1.626 m)   Wt 62.6 kg (138 lb)   SpO2 97%   BMI 23.69 kg/m²     Physical Exam  GENERAL: Older patient in no distress.  HEENT: Pupils equal and reactive. Extraocular movements intact. Nose intact.      Pharynx moist.   NECK: Supple.   HEART: Regular rate and rhythm. No murmur or gallop auscultated.  LUNGS: faint crackles to bases posteriorly   ABDOMEN: Bowel sounds present. Non-tender, no masses palpated.  EXTREMITIES: Normal muscle tone and joint movement, no cyanosis or clubbing.   LYMPHATICS: No adenopathy palpated, trace edema to legs   SKIN: Dry, intact, no lesions.   NEURO: Cranial nerves II-XII intact. Motor strength 5/5 bilaterally, upper and lower extremities.  PSYCH: Appropriate affect.    Assessment:       1. Bronchiectasis without complication              Plan:       Bronchiectasis without complication             Continue to use your acapella twice a day  Fine to take mucinex   Patient not interested in sleep study, likely has TONYA causing her fatigue      Follow up in about 6 months (around 9/23/2023).                      "

## 2023-05-01 ENCOUNTER — OFFICE VISIT (OUTPATIENT)
Dept: FAMILY MEDICINE | Facility: CLINIC | Age: 88
End: 2023-05-01
Payer: MEDICARE

## 2023-05-01 ENCOUNTER — PATIENT OUTREACH (OUTPATIENT)
Dept: ADMINISTRATIVE | Facility: HOSPITAL | Age: 88
End: 2023-05-01
Payer: MEDICARE

## 2023-05-01 VITALS
OXYGEN SATURATION: 98 % | HEART RATE: 63 BPM | BODY MASS INDEX: 24.43 KG/M2 | HEIGHT: 64 IN | WEIGHT: 143.13 LBS | DIASTOLIC BLOOD PRESSURE: 64 MMHG | SYSTOLIC BLOOD PRESSURE: 132 MMHG | RESPIRATION RATE: 16 BRPM

## 2023-05-01 DIAGNOSIS — I48.0 PAROXYSMAL ATRIAL FIBRILLATION: ICD-10-CM

## 2023-05-01 DIAGNOSIS — B00.9 HSV INFECTION: ICD-10-CM

## 2023-05-01 DIAGNOSIS — F41.0 ANXIETY ATTACK: ICD-10-CM

## 2023-05-01 DIAGNOSIS — I70.0 ATHEROSCLEROSIS OF AORTA: ICD-10-CM

## 2023-05-01 DIAGNOSIS — E03.9 ACQUIRED HYPOTHYROIDISM: ICD-10-CM

## 2023-05-01 DIAGNOSIS — I10 ESSENTIAL HYPERTENSION: Primary | ICD-10-CM

## 2023-05-01 DIAGNOSIS — K21.9 GASTROESOPHAGEAL REFLUX DISEASE, UNSPECIFIED WHETHER ESOPHAGITIS PRESENT: ICD-10-CM

## 2023-05-01 DIAGNOSIS — Z76.89 ENCOUNTER TO ESTABLISH CARE WITH NEW DOCTOR: ICD-10-CM

## 2023-05-01 DIAGNOSIS — E78.5 HYPERLIPIDEMIA, UNSPECIFIED HYPERLIPIDEMIA TYPE: ICD-10-CM

## 2023-05-01 PROCEDURE — 99213 OFFICE O/P EST LOW 20 MIN: CPT | Mod: PBBFAC | Performed by: FAMILY MEDICINE

## 2023-05-01 PROCEDURE — 99999 PR PBB SHADOW E&M-EST. PATIENT-LVL III: ICD-10-PCS | Mod: PBBFAC,,, | Performed by: FAMILY MEDICINE

## 2023-05-01 PROCEDURE — 99214 OFFICE O/P EST MOD 30 MIN: CPT | Mod: S$PBB,ICN,, | Performed by: FAMILY MEDICINE

## 2023-05-01 PROCEDURE — 99214 PR OFFICE/OUTPT VISIT, EST, LEVL IV, 30-39 MIN: ICD-10-PCS | Mod: S$PBB,ICN,, | Performed by: FAMILY MEDICINE

## 2023-05-01 PROCEDURE — 99999 PR PBB SHADOW E&M-EST. PATIENT-LVL III: CPT | Mod: PBBFAC,,, | Performed by: FAMILY MEDICINE

## 2023-05-01 NOTE — PROGRESS NOTES
Population Health Outreach.  05/01/2023  EFAX SENT TO [facility] DEPT FOR MOST RECENT DEXA SCREENING & LIPID PANEL

## 2023-05-01 NOTE — LETTER
FAX      AUTHORIZATION FOR RELEASE OF   CONFIDENTIAL INFORMATION        [facility] MEDICAL RECORDS      We are seeing Selene Posey, date of birth 1935, in the clinic at [facility]. Genesis Rich MD is the patient's PCP. Selene Posey has an outstanding lab/procedure at the time we reviewed their chart. In order to help keep their health information updated, Selene Posey has authorized us to request the following medical record(s):              ( X )  MOST RECENT FASTING LAB RESULTS    ( X )  DEXA SCAN ( MOST RECENT WITHIN 3 YRS)                      Please fax records to Genesis Rich MD at [facility]  [phone].      Kaylyn Mendiola LPN  Performance Improvement Coordinator  [facility]  [address] [place] [address] [phone]

## 2023-05-01 NOTE — PROGRESS NOTES
[facility] - Clinic Note    Subjective      Ms. Posey is a 87 y.o. female who presents to clinic establish care.     Previously followed by Dr. Thibodeaux.   Reports had labs and DEXA done recently at Dr. Richardson.     History of HTN and Afib: followed by Dr. Queen.   On metoprolol and aspirin    GERD: currently on prilosec    Hypothyroidism: takes levothyroxine    HLD: on ezetimibe    Has a history of oral and genital HSV-takes acyclovir prn during breakouts    Anxiety attacks: klonopin prn. No increase use.      PMCOMPA Morton has a past medical history of Bronchiectasis, CKD (chronic kidney disease), GERD (gastroesophageal reflux disease), High cholesterol, Hypertension, Paroxysmal atrial fibrillation, and Thyroid disease.   PSXCOMPA Morton has a past surgical history that includes Appendectomy; Hysterectomy; Eye surgery (August 2022); Breast surgery (1975); Tonsillectomy (1944); and Joint replacement (2020).   DENISE Morton's family history includes Breast cancer (age of onset: 65) in her mother; Cancer in her mother.   ELVA Morton reports that she has never smoked. She has never used smokeless tobacco. She reports that she does not drink alcohol and does not use drugs.   ROSCOE Morton is allergic to azithromycin, codeine, erythropoietin analogues, pcn [penicillins], sulfa (sulfonamide antibiotics), atorvastatin, and fenofibrate.   EMMY Morton has a current medication list which includes the following prescription(s): acyclovir, aspirin, clonazepam, fluticasone propionate, levothyroxine, metoprolol succinate, omeprazole, and ezetimibe.     Review of Systems   Constitutional:  Negative for activity change, appetite change, chills, fatigue and fever.   Eyes:  Negative for visual disturbance.   Respiratory:  Negative for cough and shortness of breath.    Cardiovascular:  Negative for chest pain, palpitations and leg swelling.   Gastrointestinal:  Negative for abdominal pain, nausea and vomiting.   Skin:   "Negative for wound.   Neurological:  Negative for dizziness and headaches.   Psychiatric/Behavioral:  Negative for confusion, decreased concentration and sleep disturbance. The patient is not nervous/anxious.    Objective     /64 (BP Location: Left arm, Patient Position: Sitting, BP Method: Medium (Manual))   Pulse 63   Resp 16   Ht 5' 4" (1.626 m)   Wt 64.9 kg (143 lb 2 oz)   SpO2 98%   BMI 24.57 kg/m²     Physical Exam   Constitutional: normal appearance. She appears well-developed and well-nourished.  Non-toxic appearance. No distress. She does not appear ill.   HENT:   Head: Normocephalic and atraumatic.   Eyes: Right eye exhibits no discharge. Left eye exhibits no discharge.   Cardiovascular: Normal rate, regular rhythm, normal heart sounds and normal pulses. Exam reveals no gallop and no friction rub.   No murmur heard.Pulmonary:      Effort: Pulmonary effort is normal. No respiratory distress.      Breath sounds: Normal breath sounds. No wheezing, rhonchi or rales.     Abdominal: Normal appearance.   Musculoskeletal:      Cervical back: Neck supple.   Lymphadenopathy:     She has no cervical adenopathy.   Neurological: She is alert.   Skin: Skin is warm and dry. Capillary refill takes less than 2 seconds. She is not diaphoretic.   Psychiatric: Her behavior is normal. Mood, judgment and thought content normal.   Vitals reviewed.   Assessment/Plan     Selene was seen today for establish care.    Diagnoses and all orders for this visit:  -New patient and new problem to me    Essential hypertension  -stable and well controlled. Continue current regimen    Atherosclerosis of aorta  -calcified aortic plaque seen on CXR 2022. On zetia. Continue to monitor    Gastroesophageal reflux disease, unspecified whether esophagitis present  -stable on prilosec    Paroxysmal atrial fibrillation  -well controlled on metoprolol. Stable. Continue follow up with cards    Acquired hypothyroidism  -stable. Continue " levothyroxine    HSV infection  -acyclovir prn    Hyperlipidemia, unspecified hyperlipidemia type  -continue zetia    Anxiety attack  -klonopin stable.     Encounter to establish care with new doctor    -will obtain recent labs and DEXA.     Follow up in about 6 months (around 11/1/2023), or if symptoms worsen or fail to improve.        Genesis Rich MD  Family Medicine  [facility]

## 2023-05-04 ENCOUNTER — PATIENT OUTREACH (OUTPATIENT)
Dept: ADMINISTRATIVE | Facility: HOSPITAL | Age: 88
End: 2023-05-04
Payer: MEDICARE

## 2023-05-04 NOTE — PROGRESS NOTES
Records Received, hyper-linked into chart at this time. The following record(s)  below were uploaded for Health Maintenance .    09/27/2022  DEXA SCREENING           12/19/2022  LIPID PANEL

## 2023-09-25 ENCOUNTER — TELEPHONE (OUTPATIENT)
Dept: FAMILY MEDICINE | Facility: CLINIC | Age: 88
End: 2023-09-25
Payer: MEDICARE

## 2023-09-25 NOTE — TELEPHONE ENCOUNTER
----- Message from Lorraine Araujo sent at 9/25/2023  1:08 PM CDT -----  Contact: PT  Type:  Sooner Apoointment Request    Caller is requesting a sooner appointment.  Caller declined first available appointment listed below.  Caller will not accept being placed on the waitlist and is requesting a message be sent to doctor.  Name of Caller:PT   When is the first available appointment?SCHEDULED FOR 11/01/23  Symptoms: EST CARE   Would the patient rather a call back or a response via [facility]? CALL   Best Call Back Number:[phone] (work)  Additional Information: PT WOULD LIKE A CALL BACK TO SCHEDULE A SOONER APPT. PT IS UNABLE TO GET INTO HER PORTAL WHEN SOONER APPTS ARE AVAILABLE TO RESCHEDULE.   THANK YOU

## 2023-09-27 ENCOUNTER — OFFICE VISIT (OUTPATIENT)
Dept: PULMONOLOGY | Facility: CLINIC | Age: 88
End: 2023-09-27
Payer: MEDICARE

## 2023-09-27 VITALS
SYSTOLIC BLOOD PRESSURE: 135 MMHG | BODY MASS INDEX: 24.24 KG/M2 | DIASTOLIC BLOOD PRESSURE: 65 MMHG | OXYGEN SATURATION: 97 % | WEIGHT: 142 LBS | HEIGHT: 64 IN | HEART RATE: 80 BPM

## 2023-09-27 DIAGNOSIS — J47.9 BRONCHIECTASIS WITHOUT COMPLICATION: Primary | ICD-10-CM

## 2023-09-27 PROCEDURE — 99213 OFFICE O/P EST LOW 20 MIN: CPT | Mod: S$GLB,,, | Performed by: NURSE PRACTITIONER

## 2023-09-27 PROCEDURE — 99213 PR OFFICE/OUTPT VISIT, EST, LEVL III, 20-29 MIN: ICD-10-PCS | Mod: S$GLB,,, | Performed by: NURSE PRACTITIONER

## 2023-09-27 RX ORDER — ASCORBIC ACID 500 MG
500 TABLET ORAL DAILY
COMMUNITY

## 2023-09-27 RX ORDER — OXYMETAZOLINE HCL 0.05 %
2 SPRAY, NON-AEROSOL (ML) NASAL 2 TIMES DAILY
COMMUNITY
End: 2024-03-12

## 2023-09-27 RX ORDER — SPIRONOLACTONE 25 MG
1 TABLET ORAL DAILY
COMMUNITY

## 2023-09-27 NOTE — PROGRESS NOTES
SUBJECTIVE:    Patient ID: Selene Posey is a 88 y.o. female.    Chief Complaint: Bronchiectasis    Patient here today feeling alright. She was using an IS not acapella but does feel it helps with her breathing and helps get up the mucous.  She takes Delsym at times for the cough.  She is still working in real estate.        Past Medical History:   Diagnosis Date    Bronchiectasis     CKD (chronic kidney disease)     GERD (gastroesophageal reflux disease)     High cholesterol     Hypertension     Paroxysmal atrial fibrillation     Thyroid disease      Past Surgical History:   Procedure Laterality Date    APPENDECTOMY      BREAST SURGERY      EYE SURGERY  2022    HYSTERECTOMY      JOINT REPLACEMENT      TONSILLECTOMY       Family History   Problem Relation Age of Onset    Breast cancer Mother 65    Cancer Mother         Social History:   Marital Status:   Occupation:   Alcohol History:  reports no history of alcohol use.  Tobacco History:  reports that she has never smoked. She has never used smokeless tobacco.  Drug History:  reports no history of drug use.    Review of patient's allergies indicates:   Allergen Reactions    Azithromycin     Codeine     Erythropoietin analogues     Pcn [penicillins]     Sulfa (sulfonamide antibiotics)     Atorvastatin      Other reaction(s): Joint pain    Fenofibrate      Made patient hoarse.       Current Outpatient Medications   Medication Sig Dispense Refill    acyclovir (ZOVIRAX) 400 MG tablet Take 400 mg by mouth 2 (two) times daily as needed.      ascorbic acid, vitamin C, (VITAMIN C) 500 MG tablet Take 500 mg by mouth.      aspirin (ECOTRIN) 81 MG EC tablet 81 mg.      clonazePAM (KLONOPIN) 0.5 MG tablet Take 0.5 mg by mouth daily as needed.      ezetimibe (ZETIA) 10 mg tablet Take 10 mg by mouth once daily.      fluticasone propionate (FLONASE) 50 mcg/actuation nasal spray SMARTSI Spray(s) Both Nares Twice Daily PRN       "levothyroxine (SYNTHROID) 88 MCG tablet Take 88 mcg by mouth before breakfast.      lutein 20 mg Cap Take by mouth.      metoprolol succinate (TOPROL-XL) 25 MG 24 hr tablet Take 25 mg by mouth once daily.      omeprazole (PRILOSEC) 40 MG capsule TAKE ONE CAPSULE BY MOUTH EVERY MORNING ON AN EMPTY STOMACH      oxymetazoline (AFRIN) 0.05 % nasal spray 2 sprays by Nasal route 2 (two) times daily.       No current facility-administered medications for this visit.     PFT 2021 no obstruction, mild restriction, moderate diffusion defect.     Chest xray 09/2022  IMPRESSION:  No acute pulmonary process     Mild increase in interstitial markings suggestive of chronic changes      Last ct of chest 03/2021  IMPRESSION:     1. Mild bilateral lower lobe bronchiectasis.  2. 5 mm left midlung nodule unchanged since 2016 and presumably  benign.  3. Coronary artery calcifications.     General: Feeling Well.    Eyes: Vision is good.  ENT:  no complaints   Heart::  palpitations.  Lungs: expectorates mucous at night, no shortness of breath   GI: GERD, dysphagia at times  : No dysuria, hesitancy, or nocturia.  Musculoskeletal: chronic neck discomfort   Skin: No lesions or rashes.  Neuro: occasional headaches   Lymph: legs swell during the day  Psych: anxiety better  Endo: weight stable     OBJECTIVE:      /65 (BP Location: Left arm, Patient Position: Sitting, BP Method: Medium (Manual))   Pulse 80   Ht 5' 4" (1.626 m)   Wt 64.4 kg (142 lb)   SpO2 97%   BMI 24.37 kg/m²     Physical Exam  GENERAL: Older patient in no distress.  HEENT: Pupils equal and reactive. Extraocular movements intact. Nose intact.      Pharynx moist.   NECK: Supple.   HEART: Regular rate and rhythm. No murmur or gallop auscultated.  LUNGS: faint crackles to bases posteriorly   ABDOMEN: Bowel sounds present. Non-tender, no masses palpated.  EXTREMITIES: Normal muscle tone and joint movement, no cyanosis or clubbing.   LYMPHATICS: No adenopathy palpated, " trace edema to legs   SKIN: Dry, intact, no lesions.   NEURO: Cranial nerves II-XII intact. Motor strength 5/5 bilaterally, upper and lower extremities.  PSYCH: Appropriate affect.    Assessment:       1. Bronchiectasis without complication                Plan:       Bronchiectasis without complication  -     HME - OTHER               Will order an acapella , order given to patient   Fine to take mucinex     Follow up in about 6 months (around 3/27/2024).

## 2023-10-09 ENCOUNTER — OFFICE VISIT (OUTPATIENT)
Dept: FAMILY MEDICINE | Facility: CLINIC | Age: 88
End: 2023-10-09
Payer: MEDICARE

## 2023-10-09 ENCOUNTER — HOSPITAL ENCOUNTER (OUTPATIENT)
Dept: RADIOLOGY | Facility: HOSPITAL | Age: 88
Discharge: HOME | End: 2023-10-09
Attending: STUDENT IN AN ORGANIZED HEALTH CARE EDUCATION/TRAINING PROGRAM
Payer: MEDICARE

## 2023-10-09 VITALS
OXYGEN SATURATION: 98 % | SYSTOLIC BLOOD PRESSURE: 120 MMHG | BODY MASS INDEX: 24.48 KG/M2 | RESPIRATION RATE: 16 BRPM | HEART RATE: 67 BPM | WEIGHT: 143.38 LBS | DIASTOLIC BLOOD PRESSURE: 70 MMHG | HEIGHT: 64 IN

## 2023-10-09 DIAGNOSIS — B00.1 FEVER BLISTER: ICD-10-CM

## 2023-10-09 DIAGNOSIS — M85.89 OSTEOPENIA OF MULTIPLE SITES: ICD-10-CM

## 2023-10-09 DIAGNOSIS — I70.0 ATHEROSCLEROSIS OF AORTA: ICD-10-CM

## 2023-10-09 DIAGNOSIS — E87.1 HYPONATREMIA: ICD-10-CM

## 2023-10-09 DIAGNOSIS — E87.6 HYPOKALEMIA: ICD-10-CM

## 2023-10-09 DIAGNOSIS — Z78.9 STATIN INTOLERANCE: ICD-10-CM

## 2023-10-09 DIAGNOSIS — R26.2 DIFFICULTY WALKING: ICD-10-CM

## 2023-10-09 DIAGNOSIS — G44.52 NEW DAILY PERSISTENT HEADACHE: ICD-10-CM

## 2023-10-09 DIAGNOSIS — E55.9 VITAMIN D DEFICIENCY: ICD-10-CM

## 2023-10-09 DIAGNOSIS — I10 ESSENTIAL HYPERTENSION: Primary | ICD-10-CM

## 2023-10-09 DIAGNOSIS — J47.9 BRONCHIECTASIS WITHOUT COMPLICATION: ICD-10-CM

## 2023-10-09 DIAGNOSIS — S09.90XA TRAUMATIC INJURY OF HEAD, INITIAL ENCOUNTER: ICD-10-CM

## 2023-10-09 DIAGNOSIS — K22.4 ESOPHAGEAL SPASM: ICD-10-CM

## 2023-10-09 DIAGNOSIS — E78.2 MIXED HYPERLIPIDEMIA: ICD-10-CM

## 2023-10-09 DIAGNOSIS — E03.9 ACQUIRED HYPOTHYROIDISM: ICD-10-CM

## 2023-10-09 DIAGNOSIS — I48.0 PAROXYSMAL ATRIAL FIBRILLATION: ICD-10-CM

## 2023-10-09 DIAGNOSIS — K21.9 GASTROESOPHAGEAL REFLUX DISEASE WITHOUT ESOPHAGITIS: ICD-10-CM

## 2023-10-09 PROBLEM — U07.1 COVID-19 VIRUS INFECTION: Status: RESOLVED | Noted: 2022-05-16 | Resolved: 2023-10-09

## 2023-10-09 PROBLEM — N18.2: Status: ACTIVE | Noted: 2023-10-09

## 2023-10-09 PROBLEM — E06.3 HYPOTHYROIDISM, ACQUIRED, AUTOIMMUNE: Status: ACTIVE | Noted: 2023-10-09

## 2023-10-09 PROBLEM — I13.0: Status: RESOLVED | Noted: 2023-10-09 | Resolved: 2023-10-09

## 2023-10-09 PROBLEM — N18.2: Status: RESOLVED | Noted: 2023-10-09 | Resolved: 2023-10-09

## 2023-10-09 PROBLEM — E06.3 HYPOTHYROIDISM, ACQUIRED, AUTOIMMUNE: Status: RESOLVED | Noted: 2023-10-09 | Resolved: 2023-10-09

## 2023-10-09 PROBLEM — I13.0: Status: ACTIVE | Noted: 2023-10-09

## 2023-10-09 PROCEDURE — 99999PBSHW FLU VACCINE - QUADRIVALENT - ADJUVANTED: ICD-10-PCS | Mod: PBBFAC,,,

## 2023-10-09 PROCEDURE — 70450 CT HEAD WITHOUT CONTRAST: ICD-10-PCS | Mod: 26,,, | Performed by: RADIOLOGY

## 2023-10-09 PROCEDURE — 70450 CT HEAD/BRAIN W/O DYE: CPT | Mod: TC

## 2023-10-09 PROCEDURE — 99999 PR PBB SHADOW E&M-EST. PATIENT-LVL IV: CPT | Mod: PBBFAC,,, | Performed by: STUDENT IN AN ORGANIZED HEALTH CARE EDUCATION/TRAINING PROGRAM

## 2023-10-09 PROCEDURE — 70450 CT HEAD/BRAIN W/O DYE: CPT | Mod: 26,,, | Performed by: RADIOLOGY

## 2023-10-09 PROCEDURE — 99999 PR PBB SHADOW E&M-EST. PATIENT-LVL IV: ICD-10-PCS | Mod: PBBFAC,,, | Performed by: STUDENT IN AN ORGANIZED HEALTH CARE EDUCATION/TRAINING PROGRAM

## 2023-10-09 PROCEDURE — 99214 PR OFFICE/OUTPT VISIT, EST, LEVL IV, 30-39 MIN: ICD-10-PCS | Mod: S$PBB,,, | Performed by: STUDENT IN AN ORGANIZED HEALTH CARE EDUCATION/TRAINING PROGRAM

## 2023-10-09 PROCEDURE — 99999PBSHW FLU VACCINE - QUADRIVALENT - ADJUVANTED: Mod: PBBFAC,,,

## 2023-10-09 PROCEDURE — G0008 ADMIN INFLUENZA VIRUS VAC: HCPCS | Mod: PBBFAC,PN

## 2023-10-09 PROCEDURE — 99214 OFFICE O/P EST MOD 30 MIN: CPT | Mod: PBBFAC,PN,25 | Performed by: STUDENT IN AN ORGANIZED HEALTH CARE EDUCATION/TRAINING PROGRAM

## 2023-10-09 PROCEDURE — 99214 OFFICE O/P EST MOD 30 MIN: CPT | Mod: S$PBB,,, | Performed by: STUDENT IN AN ORGANIZED HEALTH CARE EDUCATION/TRAINING PROGRAM

## 2023-10-09 NOTE — ASSESSMENT & PLAN NOTE
Lab Results   Component Value Date    TSH 0.578 05/16/2022     Stable. Continue current medications and regular followup.
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
## 2023-10-09 NOTE — ASSESSMENT & PLAN NOTE
Stable. Continue current medications and regular followup.  Hypertension Medications             metoprolol succinate (TOPROL-XL) 25 MG 24 hr tablet Take 25 mg by mouth once daily.

## 2023-10-20 ENCOUNTER — LAB VISIT (OUTPATIENT)
Dept: LAB | Facility: HOSPITAL | Age: 88
End: 2023-10-20
Attending: STUDENT IN AN ORGANIZED HEALTH CARE EDUCATION/TRAINING PROGRAM
Payer: MEDICARE

## 2023-10-20 DIAGNOSIS — E78.2 MIXED HYPERLIPIDEMIA: ICD-10-CM

## 2023-10-20 DIAGNOSIS — I10 ESSENTIAL HYPERTENSION: ICD-10-CM

## 2023-10-20 DIAGNOSIS — E55.9 VITAMIN D DEFICIENCY: ICD-10-CM

## 2023-10-20 DIAGNOSIS — M85.89 OSTEOPENIA OF MULTIPLE SITES: ICD-10-CM

## 2023-10-20 DIAGNOSIS — E03.9 ACQUIRED HYPOTHYROIDISM: ICD-10-CM

## 2023-10-20 DIAGNOSIS — E87.6 HYPOKALEMIA: ICD-10-CM

## 2023-10-20 DIAGNOSIS — E87.1 HYPONATREMIA: ICD-10-CM

## 2023-10-20 LAB
25(OH)D3+25(OH)D2 SERPL-MCNC: 59 NG/ML (ref 30–96)
ALBUMIN SERPL BCP-MCNC: 3.8 G/DL (ref 3.5–5.2)
ALP SERPL-CCNC: 72 U/L (ref 55–135)
ALT SERPL W/O P-5'-P-CCNC: 11 U/L (ref 10–44)
ANION GAP SERPL CALC-SCNC: 13 MMOL/L (ref 8–16)
AST SERPL-CCNC: 13 U/L (ref 10–40)
BASOPHILS # BLD AUTO: 0.05 K/UL (ref 0–0.2)
BASOPHILS NFR BLD: 0.5 % (ref 0–1.9)
BILIRUB SERPL-MCNC: 0.4 MG/DL (ref 0.1–1)
BUN SERPL-MCNC: 15 MG/DL (ref 8–23)
CALCIUM SERPL-MCNC: 9.5 MG/DL (ref 8.7–10.5)
CHLORIDE SERPL-SCNC: 102 MMOL/L (ref 95–110)
CHOLEST SERPL-MCNC: 228 MG/DL (ref 120–199)
CHOLEST/HDLC SERPL: 4.5 {RATIO} (ref 2–5)
CO2 SERPL-SCNC: 25 MMOL/L (ref 23–29)
CREAT SERPL-MCNC: 0.8 MG/DL (ref 0.5–1.4)
DIFFERENTIAL METHOD: ABNORMAL
EOSINOPHIL # BLD AUTO: 0.6 K/UL (ref 0–0.5)
EOSINOPHIL NFR BLD: 6 % (ref 0–8)
ERYTHROCYTE [DISTWIDTH] IN BLOOD BY AUTOMATED COUNT: 13.1 % (ref 11.5–14.5)
EST. GFR  (NO RACE VARIABLE): >60 ML/MIN/1.73 M^2
GLUCOSE SERPL-MCNC: 89 MG/DL (ref 70–110)
HCT VFR BLD AUTO: 37.5 % (ref 37–48.5)
HDLC SERPL-MCNC: 51 MG/DL (ref 40–75)
HDLC SERPL: 22.4 % (ref 20–50)
HGB BLD-MCNC: 12.1 G/DL (ref 12–16)
IMM GRANULOCYTES # BLD AUTO: 0.03 K/UL (ref 0–0.04)
IMM GRANULOCYTES NFR BLD AUTO: 0.3 % (ref 0–0.5)
LDLC SERPL CALC-MCNC: 141.6 MG/DL (ref 63–159)
LYMPHOCYTES # BLD AUTO: 3.7 K/UL (ref 1–4.8)
LYMPHOCYTES NFR BLD: 38.5 % (ref 18–48)
MCH RBC QN AUTO: 29.2 PG (ref 27–31)
MCHC RBC AUTO-ENTMCNC: 32.3 G/DL (ref 32–36)
MCV RBC AUTO: 91 FL (ref 82–98)
MONOCYTES # BLD AUTO: 0.7 K/UL (ref 0.3–1)
MONOCYTES NFR BLD: 7.3 % (ref 4–15)
NEUTROPHILS # BLD AUTO: 4.5 K/UL (ref 1.8–7.7)
NEUTROPHILS NFR BLD: 47.4 % (ref 38–73)
NONHDLC SERPL-MCNC: 177 MG/DL
NRBC BLD-RTO: 0 /100 WBC
PLATELET # BLD AUTO: 326 K/UL (ref 150–450)
PMV BLD AUTO: 9 FL (ref 9.2–12.9)
POTASSIUM SERPL-SCNC: 4.2 MMOL/L (ref 3.5–5.1)
PROT SERPL-MCNC: 7.6 G/DL (ref 6–8.4)
RBC # BLD AUTO: 4.14 M/UL (ref 4–5.4)
SODIUM SERPL-SCNC: 140 MMOL/L (ref 136–145)
TRIGL SERPL-MCNC: 177 MG/DL (ref 30–150)
TSH SERPL DL<=0.005 MIU/L-ACNC: 0.97 UIU/ML (ref 0.4–4)
WBC # BLD AUTO: 9.47 K/UL (ref 3.9–12.7)

## 2023-10-20 PROCEDURE — 85025 COMPLETE CBC W/AUTO DIFF WBC: CPT | Performed by: STUDENT IN AN ORGANIZED HEALTH CARE EDUCATION/TRAINING PROGRAM

## 2023-10-20 PROCEDURE — 82306 VITAMIN D 25 HYDROXY: CPT | Performed by: STUDENT IN AN ORGANIZED HEALTH CARE EDUCATION/TRAINING PROGRAM

## 2023-10-20 PROCEDURE — 80061 LIPID PANEL: CPT | Performed by: STUDENT IN AN ORGANIZED HEALTH CARE EDUCATION/TRAINING PROGRAM

## 2023-10-20 PROCEDURE — 80053 COMPREHEN METABOLIC PANEL: CPT | Performed by: STUDENT IN AN ORGANIZED HEALTH CARE EDUCATION/TRAINING PROGRAM

## 2023-10-20 PROCEDURE — 84443 ASSAY THYROID STIM HORMONE: CPT | Performed by: STUDENT IN AN ORGANIZED HEALTH CARE EDUCATION/TRAINING PROGRAM

## 2023-10-20 PROCEDURE — 36415 COLL VENOUS BLD VENIPUNCTURE: CPT | Performed by: STUDENT IN AN ORGANIZED HEALTH CARE EDUCATION/TRAINING PROGRAM

## 2023-10-27 ENCOUNTER — TELEPHONE (OUTPATIENT)
Dept: FAMILY MEDICINE | Facility: CLINIC | Age: 88
End: 2023-10-27
Payer: MEDICARE

## 2023-10-27 NOTE — TELEPHONE ENCOUNTER
----- Message from Thomas Ramos sent at 10/27/2023  3:12 PM CDT -----  Type: Needs Medical Advice  Who Called:  pt   Symptoms (please be specific):  ongoing uneasy feeling   Pharmacy name and phone #:    [id number] - [place], [place] - [address]  [address]  [place] [address]  Phone: [phone] Fax: [phone]      Best Call Back Number: [phone]    Additional Information: pt stated she would like to be advised in regards to ongoing symptoms pt is no longer having diarrhea still feels uneasy pt wanted to be advised on medications provider feels pt should take and pt stated she would like to be advised on if pt should begin use pf certain medications again please call pt back to advise asap thanks! Pt wants to know if taking pepto would be okay

## 2023-10-27 NOTE — TELEPHONE ENCOUNTER
Returned call to patient. Patient reports taking probiotics. Pt reports queasy, diarrhea and nausea. Informed pt she could try to take Pepto to ease her symptoms. Pt voiced understanding.

## 2024-01-09 ENCOUNTER — NURSE TRIAGE (OUTPATIENT)
Dept: ADMINISTRATIVE | Facility: CLINIC | Age: 89
End: 2024-01-09
Payer: MEDICARE

## 2024-01-10 NOTE — TELEPHONE ENCOUNTER
Patient refil of thyroid medication      Levothyroxine (SYNTHROID) 88 MCG tablet 88 mcg, Before breakfast                 To [facility] in [place]. She needs for 1/10/2024  Reason for Disposition   [1] Caller has NON-URGENT medicine question about med that PCP prescribed AND [2] triager unable to answer question    Protocols used: Medication Refill and Renewal Call-A-

## 2024-01-22 ENCOUNTER — OFFICE VISIT (OUTPATIENT)
Dept: URGENT CARE | Facility: CLINIC | Age: 89
End: 2024-01-22
Payer: MEDICARE

## 2024-01-22 VITALS
TEMPERATURE: 98 F | SYSTOLIC BLOOD PRESSURE: 140 MMHG | HEIGHT: 65 IN | OXYGEN SATURATION: 98 % | HEART RATE: 75 BPM | RESPIRATION RATE: 18 BRPM | BODY MASS INDEX: 23.32 KG/M2 | WEIGHT: 140 LBS | DIASTOLIC BLOOD PRESSURE: 70 MMHG

## 2024-01-22 DIAGNOSIS — J06.9 VIRAL UPPER RESPIRATORY ILLNESS: Primary | ICD-10-CM

## 2024-01-22 PROCEDURE — 99213 OFFICE O/P EST LOW 20 MIN: CPT | Mod: S$GLB,,,

## 2024-01-22 RX ORDER — METHYLPREDNISOLONE 4 MG/1
TABLET ORAL
Qty: 21 EACH | Refills: 0 | Status: SHIPPED | OUTPATIENT
Start: 2024-01-22 | End: 2024-01-22

## 2024-01-22 RX ORDER — METHYLPREDNISOLONE 4 MG/1
TABLET ORAL
Qty: 21 EACH | Refills: 0 | Status: SHIPPED | OUTPATIENT
Start: 2024-01-22 | End: 2024-02-12

## 2024-01-22 RX ORDER — MINERAL OIL
180 ENEMA (ML) RECTAL DAILY
COMMUNITY

## 2024-01-22 NOTE — PROGRESS NOTES
"Subjective:       Patient ID: Selene Posey is a 88 y.o. female.    Vitals:  height is 5' 5" (1.651 m) and weight is 63.5 kg (140 lb). Her oral temperature is 97.8 °F (36.6 °C). Her blood pressure is 140/70 (abnormal) and her pulse is 75. Her respiration is 18 and oxygen saturation is 98%.     Chief Complaint: Ear Fullness (X 2 days with bilateral ear fullness.  States if she leans her head down she feels like she "might faint")    This is a 88 y.o. female who presents today with a chief complaint of X 2 days with bilateral ear fullness.  States if she leans her head down she feels like she "might faint"  Patient presents with:  Ear Fullness: X 2 days with bilateral ear fullness.  States if she leans her head down she feels like she "might faint"         Ear Fullness   There is pain in both ears. This is a new problem. The current episode started in the past 7 days. The problem has been unchanged. There has been no fever. The pain is at a severity of 0/10. The patient is experiencing no pain. She has tried nothing for the symptoms.       Constitution: Negative.   HENT:  Positive for ear pain, tinnitus, congestion and postnasal drip.    Neck: neck negative.   Cardiovascular: Negative.    Eyes: Negative.    Respiratory: Negative.     Gastrointestinal: Negative.    Endocrine: negative.   Genitourinary: Negative.    Musculoskeletal: Negative.    Skin: Negative.    Allergic/Immunologic: Negative.    Neurological: Negative.    Hematologic/Lymphatic: Negative.    Psychiatric/Behavioral: Negative.             Objective:      Physical Exam   Constitutional: She is oriented to person, place, and time.   HENT:   Head: Normocephalic and atraumatic.   Ears:   Right Ear: External ear and ear canal normal. A middle ear effusion is present.   Left Ear: External ear and ear canal normal. A middle ear effusion is present.   Nose: Congestion present.   Mouth/Throat: Mucous membranes are moist. Oropharynx is clear.   Eyes: " Conjunctivae are normal. Pupils are equal, round, and reactive to light. Extraocular movement intact   Neck: Neck supple.   Cardiovascular: Normal rate and normal pulses.   Pulmonary/Chest: Effort normal.   Abdominal: Normal appearance.   Musculoskeletal: Normal range of motion.         General: Normal range of motion.   Neurological: no focal deficit. She is alert, oriented to person, place, and time and at baseline.   Skin: Skin is warm.   Psychiatric: Her behavior is normal. Mood, judgment and thought content normal.   Nursing note and vitals reviewed.        Past medical history and current medications reviewed.       Assessment:           1. Viral upper respiratory illness              Plan:         Viral upper respiratory illness  -     Discontinue: methylPREDNISolone (MEDROL DOSEPACK) 4 mg tablet; use as directed  Dispense: 21 each; Refill: 0  -     methylPREDNISolone (MEDROL DOSEPACK) 4 mg tablet; use as directed  Dispense: 21 each; Refill: 0             Patient Instructions   Please return here or go to the Emergency Department for any concerns or worsening of condition.  Please drink plenty of fluids.  Please get plenty of rest.  If you were prescribed antibiotics, please take them to completion.  If you were given wait & see antibiotics, please wait 5-7 days before taking them, and only take them if your symptoms have worsened or not improved.  If you do begin taking the antibiotics, please take them to completion.  If you were given a steroid shot in the clinic and have also been given a prescription for a steroid such as Prednisone or a Medrol Dose Pack, please begin taking them tomorrow.  If you do not have Hypertension or any history of palpitations, it is ok to take over the counter Sudafed or Mucinex D or Allegra-D or Claritin-D or Zyrtec-D.  If you do take one of the above, it is ok to combine that with plain over the counter Mucinex or Allegra or Claritin or Zyrtec.  If for example you are taking  Zyrtec -D, you can combine that with Mucinex, but not Mucinex-D.  If you are taking Mucinex-D, you can combine that with plain Allegra or Claritin or Zyrtec.   If you do have Hypertension or palpitations, it is safe to take Coricidin HBP for relief of sinus symptoms.  If not allergic, please take over the counter Tylenol (Acetaminophen) and/or Motrin (Ibuprofen) as directed for control of pain and/or fever.  Please follow up with your primary care doctor or specialist as needed.    If you  smoke, please stop smoking.

## 2024-01-31 ENCOUNTER — CLINICAL SUPPORT (OUTPATIENT)
Dept: URGENT CARE | Facility: CLINIC | Age: 89
End: 2024-01-31
Payer: MEDICARE

## 2024-01-31 VITALS
HEIGHT: 65 IN | TEMPERATURE: 98 F | BODY MASS INDEX: 23.32 KG/M2 | HEART RATE: 78 BPM | WEIGHT: 140 LBS | SYSTOLIC BLOOD PRESSURE: 114 MMHG | DIASTOLIC BLOOD PRESSURE: 63 MMHG | OXYGEN SATURATION: 96 % | RESPIRATION RATE: 16 BRPM

## 2024-01-31 DIAGNOSIS — H66.91 RIGHT OTITIS MEDIA, UNSPECIFIED OTITIS MEDIA TYPE: Primary | ICD-10-CM

## 2024-01-31 PROCEDURE — 99213 OFFICE O/P EST LOW 20 MIN: CPT | Mod: S$GLB,,, | Performed by: NURSE PRACTITIONER

## 2024-01-31 RX ORDER — DOXYCYCLINE 100 MG/1
100 CAPSULE ORAL EVERY 12 HOURS
Qty: 20 CAPSULE | Refills: 0 | Status: SHIPPED | OUTPATIENT
Start: 2024-01-31 | End: 2024-02-10

## 2024-01-31 NOTE — PROGRESS NOTES
"Subjective:       Patient ID: Selene Posey is a 88 y.o. female.    Vitals:  height is 5' 5" (1.651 m) and weight is 63.5 kg (140 lb). Her oral temperature is 98.2 °F (36.8 °C). Her blood pressure is 114/63 and her pulse is 78. Her respiration is 16 and oxygen saturation is 96%.     Chief Complaint: Otalgia (RT ear pain x 1 week. Patient was given a steroid to help with the fluid and patient stated it hasn't helped. )    This is a 88 y.o. female who presents today with a chief complaint of  Patient presents with:  Otalgia: RT ear pain x 1 week. Patient was given a steroid to help with the fluid and patient stated it hasn't helped.         Otalgia   There is pain in the right ear. This is a new problem. The current episode started 1 to 4 weeks ago. The problem occurs hourly. The problem has been unchanged. There has been no fever. The pain is at a severity of 8/10. The pain is severe.       HENT:  Positive for ear pain.            Objective:      Physical Exam   Constitutional: She is oriented to person, place, and time. She appears well-developed.   HENT:   Head: Normocephalic and atraumatic.   Ears:   Right Ear: External ear normal. Tympanic membrane is injected, erythematous (top portion orf right tm with erythema.) and bulging. Tympanic membrane is not retracted. A middle ear effusion is present.   Left Ear: External ear normal. A middle ear effusion is present.   Ears:    Nose: Rhinorrhea present.   Mouth/Throat: Mucous membranes are normal.   Eyes: Conjunctivae and lids are normal.   Neck: Trachea normal. Neck supple.   Cardiovascular: Normal rate, regular rhythm and normal heart sounds.   Pulmonary/Chest: Effort normal and breath sounds normal. No respiratory distress.   Abdominal: Normal appearance and bowel sounds are normal. She exhibits no distension and no mass. Soft. There is no abdominal tenderness.   Musculoskeletal: Normal range of motion.         General: Normal range of motion.   Neurological: " She is alert and oriented to person, place, and time. She has normal strength.   Skin: Skin is warm, dry, intact, not diaphoretic and not pale.   Psychiatric: Her speech is normal and behavior is normal. Judgment and thought content normal.   Nursing note and vitals reviewed.        Past medical history and current medications reviewed.       Assessment:           1. Right otitis media, unspecified otitis media type              Plan:         Right otitis media, unspecified otitis media type  -     doxycycline (VIBRAMYCIN) 100 MG Cap; Take 1 capsule (100 mg total) by mouth every 12 (twelve) hours. for 10 days  Dispense: 20 capsule; Refill: 0             Patient Instructions     You must understand that you've received an Urgent Care treatment only and that you may be released before all your medical problems are known or treated. You, the patient, will arrange for follow up care as instructed.  Follow up with your PCP or specialty clinic as directed in the next 1-2 weeks if not improved or as needed.  You can call [phone] to schedule an appointment with the appropriate provider.  If your condition worsens we recommend that you receive another evaluation at the emergency room immediately or contact your primary medical clinics after hours call service to discuss your concerns.  Please return here or go to the Emergency Department for any concerns or worsening of condition.  Please if you smoke please consider quitting. [facility] hotline number is [phone], available at this number is free counseling and medications to live a healthier life!       If you were prescribed a narcotic or controlled medication, do not drive or operate heavy equipment or machinery while taking these medications.    If you were not prescribed an antibiotic and your not better please return for a recheck. Antibiotic therapy is not always indicated initially.   Please attempt over the counter medications, give it time  and try Echinacea, Zinc and Vitamin C to fight common colds and virus.

## 2024-01-31 NOTE — PATIENT INSTRUCTIONS
You must understand that you've received an Urgent Care treatment only and that you may be released before all your medical problems are known or treated. You, the patient, will arrange for follow up care as instructed.  Follow up with your PCP or specialty clinic as directed in the next 1-2 weeks if not improved or as needed.  You can call [phone] to schedule an appointment with the appropriate provider.  If your condition worsens we recommend that you receive another evaluation at the emergency room immediately or contact your primary medical clinics after hours call service to discuss your concerns.  Please return here or go to the Emergency Department for any concerns or worsening of condition.  Please if you smoke please consider quitting. [facility] Smoke cessation hotline number is [phone], available at this number is free counseling and medications to live a healthier life!       If you were prescribed a narcotic or controlled medication, do not drive or operate heavy equipment or machinery while taking these medications.    If you were not prescribed an antibiotic and your not better please return for a recheck. Antibiotic therapy is not always indicated initially.   Please attempt over the counter medications, give it time and try Echinacea, Zinc and Vitamin C to fight common colds and virus.

## 2024-03-12 ENCOUNTER — OFFICE VISIT (OUTPATIENT)
Dept: FAMILY MEDICINE | Facility: CLINIC | Age: 89
End: 2024-03-12
Payer: MEDICARE

## 2024-03-12 VITALS
RESPIRATION RATE: 16 BRPM | HEIGHT: 65 IN | DIASTOLIC BLOOD PRESSURE: 73 MMHG | HEART RATE: 68 BPM | WEIGHT: 144.5 LBS | BODY MASS INDEX: 24.07 KG/M2 | OXYGEN SATURATION: 94 % | SYSTOLIC BLOOD PRESSURE: 119 MMHG

## 2024-03-12 DIAGNOSIS — K21.9 GASTROESOPHAGEAL REFLUX DISEASE WITHOUT ESOPHAGITIS: ICD-10-CM

## 2024-03-12 DIAGNOSIS — E78.2 MIXED HYPERLIPIDEMIA: ICD-10-CM

## 2024-03-12 DIAGNOSIS — I10 ESSENTIAL HYPERTENSION: Primary | ICD-10-CM

## 2024-03-12 DIAGNOSIS — I70.0 ATHEROSCLEROSIS OF AORTA: ICD-10-CM

## 2024-03-12 DIAGNOSIS — J31.0 CHRONIC RHINITIS: ICD-10-CM

## 2024-03-12 DIAGNOSIS — L82.1 SEBORRHEIC KERATOSIS: ICD-10-CM

## 2024-03-12 DIAGNOSIS — Z78.9 STATIN INTOLERANCE: ICD-10-CM

## 2024-03-12 DIAGNOSIS — J47.9 BRONCHIECTASIS WITHOUT COMPLICATION: ICD-10-CM

## 2024-03-12 DIAGNOSIS — I48.0 PAROXYSMAL ATRIAL FIBRILLATION: ICD-10-CM

## 2024-03-12 PROCEDURE — 99999 PR PBB SHADOW E&M-EST. PATIENT-LVL III: CPT | Mod: PBBFAC,,, | Performed by: STUDENT IN AN ORGANIZED HEALTH CARE EDUCATION/TRAINING PROGRAM

## 2024-03-12 PROCEDURE — 99213 OFFICE O/P EST LOW 20 MIN: CPT | Mod: PBBFAC,PN | Performed by: STUDENT IN AN ORGANIZED HEALTH CARE EDUCATION/TRAINING PROGRAM

## 2024-03-12 PROCEDURE — 99214 OFFICE O/P EST MOD 30 MIN: CPT | Mod: S$PBB,,, | Performed by: STUDENT IN AN ORGANIZED HEALTH CARE EDUCATION/TRAINING PROGRAM

## 2024-03-12 RX ORDER — FLUTICASONE PROPIONATE 50 MCG
1 SPRAY, SUSPENSION (ML) NASAL DAILY
Qty: 16 G | Refills: 3 | Status: SHIPPED | OUTPATIENT
Start: 2024-03-12 | End: 2024-03-13 | Stop reason: SDUPTHER

## 2024-03-12 RX ORDER — FLUTICASONE PROPIONATE 50 MCG
SPRAY, SUSPENSION (ML) NASAL
Status: CANCELLED | OUTPATIENT
Start: 2024-03-12

## 2024-03-12 NOTE — TELEPHONE ENCOUNTER
No care due was identified.  [facility] Embedded Care Due Messages. Reference number: [id number].   3/12/2024 3:34:09 PM CDT

## 2024-03-12 NOTE — PROGRESS NOTES
Subjective:       Patient ID: Selene Posey is a 88 y.o. female.    Chief Complaint: spot on chest    Patient Active Problem List:     GERD (gastroesophageal reflux disease)     Trigger ring finger of right hand     Status post total left knee replacement     Difficulty walking     Weakness of right lower extremity     Hyponatremia     Hypokalemia     Acquired hypothyroidism     Atherosclerosis of aorta     Essential hypertension     Paroxysmal atrial fibrillation     Hyperlipidemia     Bronchiectasis without complication     Statin intolerance     Fever blister     Esophageal spasm     Osteopenia of multiple sites    Current Outpatient Medications:  acyclovir (ZOVIRAX) 400 MG tablet, Take 400 mg by mouth 2 (two) times daily as needed.  ascorbic acid, vitamin C, (VITAMIN C) 500 MG tablet, Take 500 mg by mouth.  aspirin (ECOTRIN) 81 MG EC tablet, 81 mg.  clonazePAM (KLONOPIN) 0.5 MG tablet, Take 0.5 mg by mouth daily as needed.  fexofenadine (ALLEGRA) 180 MG tablet, Take 180 mg by mouth once daily.  levothyroxine (SYNTHROID) 88 MCG tablet, Take 88 mcg by mouth before breakfast.  lutein 20 mg Cap, Take by mouth.  metoprolol succinate (TOPROL-XL) 25 MG 24 hr tablet, Take 25 mg by mouth once daily.  omeprazole (PRILOSEC) 40 MG capsule, TAKE ONE CAPSULE BY MOUTH EVERY MORNING ON AN EMPTY STOMACH  fluticasone propionate (FLONASE) 50 mcg/actuation nasal spray, 1 spray (50 mcg total) by Each Nostril route once daily.    No current facility-administered medications for this visit.          Review of Systems   Constitutional:  Negative for activity change and appetite change.   Respiratory:  Negative for shortness of breath.    Cardiovascular:  Negative for chest pain.   Gastrointestinal:  Negative for anal bleeding.   Genitourinary:  Negative for dysuria.   Integumentary:  Positive for mole/lesion. Negative for rash.   Psychiatric/Behavioral:  Negative for sleep disturbance.          Objective:      Physical  Exam  Constitutional:       General: She is not in acute distress.     Appearance: Normal appearance. She is not ill-appearing.   Eyes:      Conjunctiva/sclera: Conjunctivae normal.   Cardiovascular:      Rate and Rhythm: Normal rate and regular rhythm.      Heart sounds: Normal heart sounds. No murmur heard.  Pulmonary:      Effort: Pulmonary effort is normal. No respiratory distress.      Breath sounds: Normal breath sounds.   Musculoskeletal:      Right lower leg: No edema.      Left lower leg: No edema.   Skin:     General: Skin is warm and dry.   Neurological:      Mental Status: She is alert. Mental status is at baseline.      Gait: Gait normal.   Psychiatric:         Mood and Affect: Mood normal.         Behavior: Behavior normal.         Thought Content: Thought content normal.         Judgment: Judgment normal.           Assessment:       1. Essential hypertension    2. Bronchiectasis without complication    3. Paroxysmal atrial fibrillation    4. Atherosclerosis of aorta    5. Mixed hyperlipidemia    6. Gastroesophageal reflux disease without esophagitis    7. Seborrheic keratosis    8. Statin intolerance    9. Chronic rhinitis        Plan:       Problem List Items Addressed This Visit          Pulmonary    Bronchiectasis without complication     Stable. Continue current medications and regular followup.  asymptomatic            Cardiac/Vascular    Atherosclerosis of aorta     Stable. Continue current medications and regular followup.  Continue risk factor management           Essential hypertension - Primary     Stable. Continue current medications and regular followup.           Paroxysmal atrial fibrillation     Stable. Continue current medications and regular followup.           Hyperlipidemia     Stable. Cannot tolerate zetia  Off of the medication            GI    GERD (gastroesophageal reflux disease)     Stable. Continue current medications and regular followup.  No dysphagia            Other     Statin intolerance     Other Visit Diagnoses       Seborrheic keratosis        seeing dermatology monday    Chronic rhinitis        Relevant Medications    fluticasone propionate (FLONASE) 50 mcg/actuation nasal spray

## 2024-03-13 RX ORDER — FLUTICASONE PROPIONATE 50 MCG
1 SPRAY, SUSPENSION (ML) NASAL DAILY
Qty: 32 G | Refills: 1 | Status: SHIPPED | OUTPATIENT
Start: 2024-03-13

## 2024-03-13 NOTE — TELEPHONE ENCOUNTER
Refill Routing Note   Medication(s) are not appropriate for processing by [facility] for the following reason(s):        New or recently adjusted medication    [facility] action(s):  Defer        Medication Therapy Plan: Qty has been updated to 90 day supply ( 2 boxes with 120metered sprays per bottle)      Appointments  past 12m or future 3m with PCP    Date Provider   Last Visit   10/9/2023 Alisha Lopez MD   Next Visit   3/12/2024 Alisha Lopez MD   ED visits in past 90 days: 0        Note composed:11:10 AM 03/13/2024

## 2024-03-13 NOTE — TELEPHONE ENCOUNTER
----- Message from Shannen Sears sent at 3/13/2024 10:45 AM CDT -----  Contact: pt [phone]  Type: Needs Medical Advice  Who Called:  Pt     Pharmacy name and phone #:    [id number] - [place], [place] - [address]  [address]  [facility] [address]  Phone: [phone] Fax: [phone]        Best Call Back Number: [phone]    Additional Information: Pt requesting her rx for fluticasone propionate (FLONASE) 50 mcg/actuation nasal spray be changed to a 90 a supply. Pt stated she does not want to to pharmacy once a month for a refill. Pls call back and advise

## 2024-03-13 NOTE — TELEPHONE ENCOUNTER
Pt requesting 90 day supply rx for fluticasone propionate (FLONASE) 50 mcg/actuation  instead of month supply. Please advise

## 2024-03-22 ENCOUNTER — HOSPITAL ENCOUNTER (INPATIENT)
Facility: HOSPITAL | Age: 89
LOS: 1 days | Discharge: HOME | DRG: 395 | End: 2024-03-24
Attending: EMERGENCY MEDICINE | Admitting: INTERNAL MEDICINE
Payer: MEDICARE

## 2024-03-22 DIAGNOSIS — R07.9 CHEST PAIN: ICD-10-CM

## 2024-03-22 DIAGNOSIS — K92.2 GI BLEED: ICD-10-CM

## 2024-03-22 DIAGNOSIS — K52.9 ACUTE HEMORRHAGIC COLITIS: Primary | ICD-10-CM

## 2024-03-22 LAB
ABO + RH BLD: NORMAL
ALBUMIN SERPL BCP-MCNC: 4.1 G/DL (ref 3.5–5.2)
ALBUMIN SERPL BCP-MCNC: 4.1 G/DL (ref 3.5–5.2)
ALP SERPL-CCNC: 67 U/L (ref 55–135)
ALP SERPL-CCNC: 67 U/L (ref 55–135)
ALT SERPL W/O P-5'-P-CCNC: 10 U/L (ref 10–44)
ALT SERPL W/O P-5'-P-CCNC: 10 U/L (ref 10–44)
ANION GAP SERPL CALC-SCNC: 10 MMOL/L (ref 8–16)
ANION GAP SERPL CALC-SCNC: 6 MMOL/L (ref 8–16)
AST SERPL-CCNC: 13 U/L (ref 10–40)
AST SERPL-CCNC: 13 U/L (ref 10–40)
BASOPHILS # BLD AUTO: 0.05 K/UL (ref 0–0.2)
BASOPHILS NFR BLD: 0.4 % (ref 0–1.9)
BILIRUB DIRECT SERPL-MCNC: 0.1 MG/DL (ref 0.1–0.3)
BILIRUB SERPL-MCNC: 0.3 MG/DL (ref 0.1–1)
BILIRUB SERPL-MCNC: 0.3 MG/DL (ref 0.1–1)
BLD GP AB SCN CELLS X3 SERPL QL: NORMAL
BNP SERPL-MCNC: 36 PG/ML (ref 0–99)
BUN SERPL-MCNC: 14 MG/DL (ref 8–23)
BUN SERPL-MCNC: 18 MG/DL (ref 8–23)
CALCIUM SERPL-MCNC: 9 MG/DL (ref 8.7–10.5)
CALCIUM SERPL-MCNC: 9.3 MG/DL (ref 8.7–10.5)
CHLORIDE SERPL-SCNC: 100 MMOL/L (ref 95–110)
CHLORIDE SERPL-SCNC: 106 MMOL/L (ref 95–110)
CO2 SERPL-SCNC: 24 MMOL/L (ref 23–29)
CO2 SERPL-SCNC: 26 MMOL/L (ref 23–29)
CREAT SERPL-MCNC: 0.7 MG/DL (ref 0.5–1.4)
CREAT SERPL-MCNC: 0.8 MG/DL (ref 0.5–1.4)
CREAT SERPL-MCNC: 0.8 MG/DL (ref 0.5–1.4)
CRP SERPL-MCNC: 6.3 MG/L (ref 0–8.2)
DIFFERENTIAL METHOD BLD: ABNORMAL
EOSINOPHIL # BLD AUTO: 0.4 K/UL (ref 0–0.5)
EOSINOPHIL NFR BLD: 2.7 % (ref 0–8)
ERYTHROCYTE [DISTWIDTH] IN BLOOD BY AUTOMATED COUNT: 13.2 % (ref 11.5–14.5)
EST. GFR  (NO RACE VARIABLE): >60 ML/MIN/1.73 M^2
EST. GFR  (NO RACE VARIABLE): >60 ML/MIN/1.73 M^2
GLUCOSE SERPL-MCNC: 121 MG/DL (ref 70–110)
GLUCOSE SERPL-MCNC: 96 MG/DL (ref 70–110)
HCT VFR BLD AUTO: 37 % (ref 37–48.5)
HGB BLD-MCNC: 12.2 G/DL (ref 12–16)
IMM GRANULOCYTES # BLD AUTO: 0.06 K/UL (ref 0–0.04)
IMM GRANULOCYTES NFR BLD AUTO: 0.4 % (ref 0–0.5)
INR PPP: 0.9 (ref 0.8–1.2)
LYMPHOCYTES # BLD AUTO: 2.5 K/UL (ref 1–4.8)
LYMPHOCYTES NFR BLD: 17.8 % (ref 18–48)
MAGNESIUM SERPL-MCNC: 2.1 MG/DL (ref 1.6–2.6)
MCH RBC QN AUTO: 29.6 PG (ref 27–31)
MCHC RBC AUTO-ENTMCNC: 33 G/DL (ref 32–36)
MCV RBC AUTO: 90 FL (ref 82–98)
MONOCYTES # BLD AUTO: 1.2 K/UL (ref 0.3–1)
MONOCYTES NFR BLD: 8.3 % (ref 4–15)
NEUTROPHILS # BLD AUTO: 9.8 K/UL (ref 1.8–7.7)
NEUTROPHILS NFR BLD: 70.4 % (ref 38–73)
NRBC BLD-RTO: 0 /100 WBC
OB PNL STL: NEGATIVE
PLATELET # BLD AUTO: 317 K/UL (ref 150–450)
PMV BLD AUTO: 9 FL (ref 9.2–12.9)
POTASSIUM SERPL-SCNC: 4 MMOL/L (ref 3.5–5.1)
POTASSIUM SERPL-SCNC: 4.2 MMOL/L (ref 3.5–5.1)
PROT SERPL-MCNC: 7 G/DL (ref 6–8.4)
PROT SERPL-MCNC: 7 G/DL (ref 6–8.4)
PROTHROMBIN TIME: 9.9 SEC (ref 9–12.5)
RBC # BLD AUTO: 4.12 M/UL (ref 4–5.4)
SAMPLE: NORMAL
SODIUM SERPL-SCNC: 134 MMOL/L (ref 136–145)
SODIUM SERPL-SCNC: 138 MMOL/L (ref 136–145)
SPECIMEN OUTDATE: NORMAL
TROPONIN I SERPL HS-MCNC: 4.4 PG/ML (ref 0–14.9)
WBC # BLD AUTO: 13.87 K/UL (ref 3.9–12.7)

## 2024-03-22 PROCEDURE — 93010 ELECTROCARDIOGRAM REPORT: CPT | Mod: ,,, | Performed by: INTERNAL MEDICINE

## 2024-03-22 PROCEDURE — 25500020 PHARM REV CODE 255: Performed by: EMERGENCY MEDICINE

## 2024-03-22 PROCEDURE — 96361 HYDRATE IV INFUSION ADD-ON: CPT

## 2024-03-22 PROCEDURE — G0378 HOSPITAL OBSERVATION PER HR: HCPCS

## 2024-03-22 PROCEDURE — 96366 THER/PROPH/DIAG IV INF ADDON: CPT

## 2024-03-22 PROCEDURE — 93005 ELECTROCARDIOGRAM TRACING: CPT | Performed by: INTERNAL MEDICINE

## 2024-03-22 PROCEDURE — 63600175 PHARM REV CODE 636 W HCPCS: Performed by: INTERNAL MEDICINE

## 2024-03-22 PROCEDURE — 99900031 HC PATIENT EDUCATION (STAT)

## 2024-03-22 PROCEDURE — 83880 ASSAY OF NATRIURETIC PEPTIDE: CPT | Performed by: EMERGENCY MEDICINE

## 2024-03-22 PROCEDURE — 82272 OCCULT BLD FECES 1-3 TESTS: CPT | Performed by: EMERGENCY MEDICINE

## 2024-03-22 PROCEDURE — 96375 TX/PRO/DX INJ NEW DRUG ADDON: CPT

## 2024-03-22 PROCEDURE — 85610 PROTHROMBIN TIME: CPT | Performed by: EMERGENCY MEDICINE

## 2024-03-22 PROCEDURE — 63600175 PHARM REV CODE 636 W HCPCS: Mod: JZ,JG | Performed by: EMERGENCY MEDICINE

## 2024-03-22 PROCEDURE — 96365 THER/PROPH/DIAG IV INF INIT: CPT

## 2024-03-22 PROCEDURE — 82565 ASSAY OF CREATININE: CPT | Mod: 59

## 2024-03-22 PROCEDURE — 96367 TX/PROPH/DG ADDL SEQ IV INF: CPT

## 2024-03-22 PROCEDURE — 80048 BASIC METABOLIC PNL TOTAL CA: CPT | Mod: XB | Performed by: INTERNAL MEDICINE

## 2024-03-22 PROCEDURE — C9113 INJ PANTOPRAZOLE SODIUM, VIA: HCPCS | Performed by: EMERGENCY MEDICINE

## 2024-03-22 PROCEDURE — 25000003 PHARM REV CODE 250: Performed by: EMERGENCY MEDICINE

## 2024-03-22 PROCEDURE — 86140 C-REACTIVE PROTEIN: CPT | Performed by: INTERNAL MEDICINE

## 2024-03-22 PROCEDURE — 25000003 PHARM REV CODE 250: Performed by: INTERNAL MEDICINE

## 2024-03-22 PROCEDURE — 63600175 PHARM REV CODE 636 W HCPCS: Mod: JZ,JG | Performed by: INTERNAL MEDICINE

## 2024-03-22 PROCEDURE — 36415 COLL VENOUS BLD VENIPUNCTURE: CPT | Performed by: EMERGENCY MEDICINE

## 2024-03-22 PROCEDURE — 94761 N-INVAS EAR/PLS OXIMETRY MLT: CPT

## 2024-03-22 PROCEDURE — 84484 ASSAY OF TROPONIN QUANT: CPT | Performed by: EMERGENCY MEDICINE

## 2024-03-22 PROCEDURE — 80053 COMPREHEN METABOLIC PANEL: CPT | Performed by: EMERGENCY MEDICINE

## 2024-03-22 PROCEDURE — 99285 EMERGENCY DEPT VISIT HI MDM: CPT | Mod: 25

## 2024-03-22 PROCEDURE — 86901 BLOOD TYPING SEROLOGIC RH(D): CPT | Performed by: EMERGENCY MEDICINE

## 2024-03-22 PROCEDURE — 83735 ASSAY OF MAGNESIUM: CPT | Performed by: INTERNAL MEDICINE

## 2024-03-22 PROCEDURE — 85025 COMPLETE CBC W/AUTO DIFF WBC: CPT | Performed by: EMERGENCY MEDICINE

## 2024-03-22 RX ORDER — LEVOTHYROXINE SODIUM 88 UG/1
88 TABLET ORAL
Status: DISCONTINUED | OUTPATIENT
Start: 2024-03-23 | End: 2024-03-24 | Stop reason: HOSPADM

## 2024-03-22 RX ORDER — LANOLIN ALCOHOL/MO/W.PET/CERES
800 CREAM (GRAM) TOPICAL
Status: DISCONTINUED | OUTPATIENT
Start: 2024-03-22 | End: 2024-03-24 | Stop reason: HOSPADM

## 2024-03-22 RX ORDER — TALC
6 POWDER (GRAM) TOPICAL NIGHTLY PRN
Status: DISCONTINUED | OUTPATIENT
Start: 2024-03-22 | End: 2024-03-24 | Stop reason: HOSPADM

## 2024-03-22 RX ORDER — IBUPROFEN 200 MG
24 TABLET ORAL
Status: DISCONTINUED | OUTPATIENT
Start: 2024-03-22 | End: 2024-03-24 | Stop reason: HOSPADM

## 2024-03-22 RX ORDER — FAMOTIDINE 10 MG/ML
20 INJECTION INTRAVENOUS DAILY
Status: DISCONTINUED | OUTPATIENT
Start: 2024-03-22 | End: 2024-03-24 | Stop reason: HOSPADM

## 2024-03-22 RX ORDER — SODIUM CHLORIDE, SODIUM LACTATE, POTASSIUM CHLORIDE, CALCIUM CHLORIDE 600; 310; 30; 20 MG/100ML; MG/100ML; MG/100ML; MG/100ML
INJECTION, SOLUTION INTRAVENOUS CONTINUOUS
Status: DISCONTINUED | OUTPATIENT
Start: 2024-03-22 | End: 2024-03-22

## 2024-03-22 RX ORDER — CLONAZEPAM 0.5 MG/1
0.5 TABLET ORAL 2 TIMES DAILY PRN
Status: DISCONTINUED | OUTPATIENT
Start: 2024-03-22 | End: 2024-03-24 | Stop reason: HOSPADM

## 2024-03-22 RX ORDER — SODIUM CHLORIDE 0.9 % (FLUSH) 0.9 %
10 SYRINGE (ML) INJECTION EVERY 12 HOURS PRN
Status: DISCONTINUED | OUTPATIENT
Start: 2024-03-22 | End: 2024-03-24 | Stop reason: HOSPADM

## 2024-03-22 RX ORDER — METOPROLOL SUCCINATE 25 MG/1
25 TABLET, EXTENDED RELEASE ORAL DAILY
Status: DISCONTINUED | OUTPATIENT
Start: 2024-03-23 | End: 2024-03-24 | Stop reason: HOSPADM

## 2024-03-22 RX ORDER — CLONAZEPAM 0.5 MG/1
0.5 TABLET ORAL 2 TIMES DAILY PRN
Status: DISCONTINUED | OUTPATIENT
Start: 2024-03-22 | End: 2024-03-22

## 2024-03-22 RX ORDER — ASPIRIN 81 MG/1
81 TABLET ORAL EVERY MORNING
Status: DISCONTINUED | OUTPATIENT
Start: 2024-03-23 | End: 2024-03-22

## 2024-03-22 RX ORDER — ACETAMINOPHEN 325 MG/1
650 TABLET ORAL EVERY 4 HOURS PRN
Status: DISCONTINUED | OUTPATIENT
Start: 2024-03-22 | End: 2024-03-24 | Stop reason: HOSPADM

## 2024-03-22 RX ORDER — CHOLESTYRAMINE 4 G/4.8G
4 POWDER, FOR SUSPENSION ORAL 2 TIMES DAILY
Status: DISCONTINUED | OUTPATIENT
Start: 2024-03-22 | End: 2024-03-22

## 2024-03-22 RX ORDER — METRONIDAZOLE 500 MG/100ML
500 INJECTION, SOLUTION INTRAVENOUS
Status: COMPLETED | OUTPATIENT
Start: 2024-03-22 | End: 2024-03-22

## 2024-03-22 RX ORDER — SODIUM,POTASSIUM PHOSPHATES 280-250MG
2 POWDER IN PACKET (EA) ORAL
Status: DISCONTINUED | OUTPATIENT
Start: 2024-03-22 | End: 2024-03-24 | Stop reason: HOSPADM

## 2024-03-22 RX ORDER — FLUOCINOLONE ACETONIDE 0.11 MG/ML
5 OIL AURICULAR (OTIC) 2 TIMES DAILY PRN
COMMUNITY
Start: 2024-02-21

## 2024-03-22 RX ORDER — IBUPROFEN 200 MG
16 TABLET ORAL
Status: DISCONTINUED | OUTPATIENT
Start: 2024-03-22 | End: 2024-03-24 | Stop reason: HOSPADM

## 2024-03-22 RX ORDER — ACETAMINOPHEN 325 MG/1
650 TABLET ORAL EVERY 8 HOURS PRN
Status: DISCONTINUED | OUTPATIENT
Start: 2024-03-22 | End: 2024-03-24 | Stop reason: HOSPADM

## 2024-03-22 RX ORDER — SODIUM CHLORIDE, SODIUM LACTATE, POTASSIUM CHLORIDE, CALCIUM CHLORIDE 600; 310; 30; 20 MG/100ML; MG/100ML; MG/100ML; MG/100ML
INJECTION, SOLUTION INTRAVENOUS CONTINUOUS
Status: ACTIVE | OUTPATIENT
Start: 2024-03-22 | End: 2024-03-23

## 2024-03-22 RX ORDER — CLONAZEPAM 0.5 MG/1
1 TABLET ORAL 2 TIMES DAILY PRN
COMMUNITY
Start: 2023-12-18

## 2024-03-22 RX ORDER — NALOXONE HCL 0.4 MG/ML
0.02 VIAL (ML) INJECTION
Status: DISCONTINUED | OUTPATIENT
Start: 2024-03-22 | End: 2024-03-24 | Stop reason: HOSPADM

## 2024-03-22 RX ORDER — LEVOFLOXACIN 5 MG/ML
500 INJECTION, SOLUTION INTRAVENOUS
Status: COMPLETED | OUTPATIENT
Start: 2024-03-22 | End: 2024-03-22

## 2024-03-22 RX ORDER — PANTOPRAZOLE SODIUM 40 MG/10ML
80 INJECTION, POWDER, LYOPHILIZED, FOR SOLUTION INTRAVENOUS
Status: COMPLETED | OUTPATIENT
Start: 2024-03-22 | End: 2024-03-22

## 2024-03-22 RX ORDER — FAMOTIDINE 20 MG/50ML
20 INJECTION, SOLUTION INTRAVENOUS 2 TIMES DAILY
Status: DISCONTINUED | OUTPATIENT
Start: 2024-03-22 | End: 2024-03-22

## 2024-03-22 RX ORDER — GLUCAGON 1 MG
1 KIT INJECTION
Status: DISCONTINUED | OUTPATIENT
Start: 2024-03-22 | End: 2024-03-24 | Stop reason: HOSPADM

## 2024-03-22 RX ORDER — ASPIRIN 81 MG/1
81 TABLET ORAL DAILY
Status: DISCONTINUED | OUTPATIENT
Start: 2024-03-23 | End: 2024-03-24 | Stop reason: HOSPADM

## 2024-03-22 RX ORDER — ASPIRIN 81 MG/1
1 TABLET ORAL EVERY MORNING
COMMUNITY

## 2024-03-22 RX ORDER — ONDANSETRON HYDROCHLORIDE 2 MG/ML
4 INJECTION, SOLUTION INTRAVENOUS EVERY 6 HOURS PRN
Status: DISCONTINUED | OUTPATIENT
Start: 2024-03-22 | End: 2024-03-24 | Stop reason: HOSPADM

## 2024-03-22 RX ORDER — METRONIDAZOLE 500 MG/100ML
500 INJECTION, SOLUTION INTRAVENOUS
Status: DISCONTINUED | OUTPATIENT
Start: 2024-03-22 | End: 2024-03-24 | Stop reason: HOSPADM

## 2024-03-22 RX ORDER — HYDROMORPHONE HYDROCHLORIDE 1 MG/ML
0.25 INJECTION, SOLUTION INTRAMUSCULAR; INTRAVENOUS; SUBCUTANEOUS EVERY 6 HOURS PRN
Status: DISCONTINUED | OUTPATIENT
Start: 2024-03-22 | End: 2024-03-24 | Stop reason: HOSPADM

## 2024-03-22 RX ORDER — ALUMINUM HYDROXIDE, MAGNESIUM HYDROXIDE, AND SIMETHICONE 1200; 120; 1200 MG/30ML; MG/30ML; MG/30ML
30 SUSPENSION ORAL 4 TIMES DAILY PRN
Status: DISCONTINUED | OUTPATIENT
Start: 2024-03-22 | End: 2024-03-24 | Stop reason: HOSPADM

## 2024-03-22 RX ADMIN — METRONIDAZOLE 500 MG: 500 INJECTION, SOLUTION INTRAVENOUS at 05:03

## 2024-03-22 RX ADMIN — PANTOPRAZOLE SODIUM 80 MG: 40 INJECTION, POWDER, FOR SOLUTION INTRAVENOUS at 02:03

## 2024-03-22 RX ADMIN — METRONIDAZOLE 500 MG: 5 INJECTION, SOLUTION INTRAVENOUS at 05:03

## 2024-03-22 RX ADMIN — LEVOFLOXACIN 500 MG: 5 INJECTION, SOLUTION INTRAVENOUS at 07:03

## 2024-03-22 RX ADMIN — FAMOTIDINE 20 MG: 10 INJECTION INTRAVENOUS at 10:03

## 2024-03-22 RX ADMIN — CEFTRIAXONE SODIUM 1 G: 1 INJECTION, POWDER, FOR SOLUTION INTRAMUSCULAR; INTRAVENOUS at 10:03

## 2024-03-22 RX ADMIN — SODIUM CHLORIDE, POTASSIUM CHLORIDE, SODIUM LACTATE AND CALCIUM CHLORIDE: 600; 310; 30; 20 INJECTION, SOLUTION INTRAVENOUS at 09:03

## 2024-03-22 RX ADMIN — SODIUM CHLORIDE 1000 ML: 9 INJECTION, SOLUTION INTRAVENOUS at 02:03

## 2024-03-22 RX ADMIN — IOHEXOL 100 ML: 350 INJECTION, SOLUTION INTRAVENOUS at 03:03

## 2024-03-22 NOTE — SUBJECTIVE & OBJECTIVE
Interval History:   Patient had improvement in her abdominal pain, however she states that her diarrhea with blood in it continues. She will attempt more solid food     Review of Systems   Constitutional:  Negative for diaphoresis, fatigue and fever.   HENT:  Negative for congestion, rhinorrhea, sinus pressure and sinus pain.    Eyes:  Negative for photophobia, redness and visual disturbance.   Respiratory:  Negative for chest tightness, shortness of breath, wheezing and stridor.    Cardiovascular:  Negative for chest pain, palpitations and leg swelling.   Gastrointestinal:  Positive for blood in stool and diarrhea. Negative for abdominal pain and constipation.   Endocrine: Negative for polydipsia, polyphagia and polyuria.   Genitourinary:  Negative for dysuria and urgency.   Musculoskeletal:  Negative for gait problem, myalgias and neck pain.   Skin:  Negative for pallor, rash and wound.   Neurological:  Negative for dizziness, numbness and headaches.   Psychiatric/Behavioral:  Negative for confusion and hallucinations. The patient is not nervous/anxious.      Objective:     Vital Signs (Most Recent):  Temp: 97.7 °F (36.5 °C) (03/22/24 1727)  Pulse: 65 (03/22/24 1745)  Resp: 18 (03/22/24 1727)  BP: (!) 160/71 (03/22/24 1745)  SpO2: 96 % (03/22/24 1727) Vital Signs (24h Range):  Temp:  [97.7 °F (36.5 °C)] 97.7 °F (36.5 °C)  Pulse:  [54-70] 65  Resp:  [11-20] 18  SpO2:  [96 %-100 %] 96 %  BP: (142-195)/(58-84) 160/71     Weight: 65.7 kg (144 lb 14.4 oz)  Body mass index is 24.49 kg/m².    Intake/Output Summary (Last 24 hours) at 3/22/2024 1856  Last data filed at 3/22/2024 1838  Gross per 24 hour   Intake 679 ml   Output --   Net 679 ml         Physical Exam  Constitutional:       General: She is not in acute distress.     Appearance: She is not ill-appearing, toxic-appearing or diaphoretic.   HENT:      Head: Normocephalic and atraumatic.      Right Ear: External ear normal.      Left Ear: External ear normal.       Nose: No congestion or rhinorrhea.   Eyes:      General: No scleral icterus.        Right eye: No discharge.         Left eye: No discharge.   Cardiovascular:      Rate and Rhythm: Normal rate and regular rhythm.      Heart sounds: No murmur heard.  Pulmonary:      Effort: No respiratory distress.      Breath sounds: No stridor. No wheezing, rhonchi or rales.   Chest:      Chest wall: No tenderness.   Abdominal:      General: There is no distension.      Palpations: There is no mass.      Tenderness: There is no abdominal tenderness. There is no guarding or rebound.      Hernia: No hernia is present.   Musculoskeletal:         General: No swelling, tenderness, deformity or signs of injury.      Right lower leg: No edema.   Skin:     Coloration: Skin is not jaundiced or pale.      Findings: No bruising, erythema, lesion or rash.   Neurological:      Mental Status: She is alert and oriented to person, place, and time. Mental status is at baseline.      Motor: No weakness.             Significant Labs: All pertinent labs within the past 24 hours have been reviewed.  CBC:   Recent Labs   Lab 03/22/24 0221   WBC 13.87*   HGB 12.2   HCT 37.0        CMP:   Recent Labs   Lab 03/22/24 0221 03/22/24  0801   * 138   K 4.2 4.0    106   CO2 24 26   * 96   BUN 18 14   CREATININE 0.8 0.7   CALCIUM 9.3 9.0   PROT 7.0  7.0  --    ALBUMIN 4.1  4.1  --    BILITOT 0.3  0.3  --    ALKPHOS 67  67  --    AST 13  13  --    ALT 10  10  --    ANIONGAP 10 6*       Significant Imaging: I have reviewed all pertinent imaging results/findings within the past 24 hours.

## 2024-03-22 NOTE — PLAN OF CARE
[facility] - Emergency Dept  Initial Discharge Assessment       Primary Care Provider: Alisha Lopez MD    Admission Diagnosis: Acute hemorrhagic colitis [K52.9]    Admission Date: 3/22/2024  Expected Discharge Date:      completed discharge assessment with pt at bedside. Pt's preferred pharmacy is [facility] in [place] Pt AAOx4s. Pt lives at home alone. Demographics, PCP, and insurance verified. No home health. No dialysis. Pt completes ADLs without assistance but has a pulse oximeter and blood pressure machine to utilize at home. Pt to discharge home via family transport. Pt has no other needs to be addressed at this time.           Payor: MEDICARE / Plan: MEDICARE PART A & B / Product Type: Government /     Extended Emergency Contact Information  Primary Emergency Contact: Jamel James  Address: UNKNOWN   [place]  Mobile Phone: [phone]  Relation: Son    Discharge Plan A: (P) Home  Discharge Plan B: (P) Home      [id number] - [place] [address] [address]  [address] [place] [address]  Phone: [phone] Fax: [phone]    [id number] - [place] - [address]  [address] [place] [address]  Phone: [phone] Fax: [phone]      Initial Assessment (most recent)       Adult Discharge Assessment - 03/22/24 1618          Discharge Assessment    Assessment Type Discharge Planning Assessment (P)      Confirmed/corrected address, phone number and insurance Yes (P)      Confirmed Demographics Correct on Facesheet (P)      Source of Information patient (P)      When was your last doctors appointment? -- (P)    Unknown    Does patient/caregiver understand observation status Yes (P)      Reason For Admission Hemorrhagic colitis (P)      People in Home alone (P)      Facility Arrived From: Home (P)      Do you expect to return to your current living situation? Yes (P)       Do you have help at home or someone to help you manage your care at home? Yes (P)      Who are your caregiver(s) and their phone number(s)? Jamel James (Son) [phone] (P)      Prior to hospitilization cognitive status: Alert/Oriented (P)      Current cognitive status: Alert/Oriented (P)      Walking or Climbing Stairs Difficulty no (P)      Dressing/Bathing Difficulty no (P)      Home Layout Able to live on 1st floor (P)      Equipment Currently Used at Home blood pressure machine (P)    Pulse Oximeter    Readmission within 30 days? No (P)      Patient currently being followed by outpatient case management? No (P)      Do you currently have service(s) that help you manage your care at home? No (P)      Do you take prescription medications? Yes (P)      Do you have prescription coverage? Yes (P)      Coverage MEDICARE - MEDICARE PART A & B (P)      Do you have any problems affording any of your prescribed medications? No (P)      Is the patient taking medications as prescribed? yes (P)      Who is going to help you get home at discharge? Jamel James (Son) [phone] (P)      How do you get to doctors appointments? car, drives self (P)      Are you on dialysis? No (P)      Do you take coumadin? No (P)      Discharge Plan A Home (P)      Discharge Plan B Home (P)      DME Needed Upon Discharge  none (P)

## 2024-03-22 NOTE — SUBJECTIVE & OBJECTIVE
Past Medical History:   Diagnosis Date    Bronchiectasis     CKD (chronic kidney disease)     COVID-19 virus infection 5/16/2022    GERD (gastroesophageal reflux disease)     High cholesterol     Hypertension     Paroxysmal atrial fibrillation     Thyroid disease        Past Surgical History:   Procedure Laterality Date    APPENDECTOMY      BREAST SURGERY  1975    EYE SURGERY  August 2022    HYSTERECTOMY      ovaries retained    JOINT REPLACEMENT  2020    knee    TONSILLECTOMY  1944       Review of patient's allergies indicates:   Allergen Reactions    Azithromycin     Codeine     Erythropoietin analogues     Pcn [penicillins]     Sulfa (sulfonamide antibiotics)     Zetia [ezetimibe]     Atorvastatin      Other reaction(s): Joint pain    Fenofibrate      Made patient hoarse.       No current facility-administered medications on file prior to encounter.     Current Outpatient Medications on File Prior to Encounter   Medication Sig    acyclovir (ZOVIRAX) 400 MG tablet Take 400 mg by mouth 2 (two) times daily as needed.    ascorbic acid, vitamin C, (VITAMIN C) 500 MG tablet Take 500 mg by mouth.    aspirin (ECOTRIN) 81 MG EC tablet 81 mg.    clonazePAM (KLONOPIN) 0.5 MG tablet Take 0.5 mg by mouth daily as needed.    fexofenadine (ALLEGRA) 180 MG tablet Take 180 mg by mouth once daily.    fluticasone propionate (FLONASE) 50 mcg/actuation nasal spray 1 spray (50 mcg total) by Each Nostril route once daily.    levothyroxine (SYNTHROID) 88 MCG tablet Take 88 mcg by mouth before breakfast.    lutein 20 mg Cap Take by mouth.    metoprolol succinate (TOPROL-XL) 25 MG 24 hr tablet Take 25 mg by mouth once daily.    omeprazole (PRILOSEC) 40 MG capsule TAKE ONE CAPSULE BY MOUTH EVERY MORNING ON AN EMPTY STOMACH     Family History       Problem Relation (Age of Onset)    Breast cancer Mother (65)    Cancer Mother          Tobacco Use    Smoking status: Never    Smokeless tobacco: Never   Substance and Sexual Activity    Alcohol  use: No    Drug use: Never    Sexual activity: Not Currently     Partners: Male     Review of Systems   Constitutional:  Negative for activity change, appetite change, chills and fever.   HENT:  Negative for congestion, hearing loss and tinnitus.    Eyes:  Negative for pain, redness and visual disturbance.   Respiratory:  Negative for apnea, cough, chest tightness, shortness of breath and wheezing.    Cardiovascular:  Negative for chest pain, palpitations and leg swelling.   Gastrointestinal:  Positive for abdominal pain, anal bleeding, diarrhea, nausea and vomiting. Negative for abdominal distention, blood in stool and constipation.   Genitourinary:  Negative for dysuria.   Musculoskeletal:  Negative for arthralgias, back pain, gait problem, joint swelling and myalgias.   Skin:  Negative for color change and rash.   Neurological:  Negative for dizziness, weakness, light-headedness and headaches.   Hematological:  Does not bruise/bleed easily.   Psychiatric/Behavioral:  Negative for confusion and sleep disturbance. The patient is not nervous/anxious.      Objective:     Vital Signs (Most Recent):  Temp: 97.7 °F (36.5 °C) (03/22/24 0206)  Pulse: 60 (03/22/24 0751)  Resp: 15 (03/22/24 0701)  BP: (!) 167/72 (03/22/24 0731)  SpO2: 99 % (03/22/24 0751) Vital Signs (24h Range):  Temp:  [97.7 °F (36.5 °C)] 97.7 °F (36.5 °C)  Pulse:  [54-67] 60  Resp:  [14-19] 15  SpO2:  [96 %-100 %] 99 %  BP: (154-195)/(71-84) 167/72     Weight: 65.8 kg (145 lb)  Body mass index is 24.13 kg/m².     Physical Exam  Constitutional:       Appearance: Normal appearance.   HENT:      Head: Normocephalic and atraumatic.      Nose: Nose normal.      Mouth/Throat:      Mouth: Mucous membranes are moist.      Pharynx: Oropharynx is clear.   Eyes:      Extraocular Movements: Extraocular movements intact.      Conjunctiva/sclera: Conjunctivae normal.      Pupils: Pupils are equal, round, and reactive to light.   Cardiovascular:      Rate and Rhythm:  "Normal rate and regular rhythm.      Pulses: Normal pulses.      Heart sounds: Normal heart sounds.   Pulmonary:      Effort: Pulmonary effort is normal.      Breath sounds: Normal breath sounds.   Abdominal:      General: Abdomen is flat. Bowel sounds are normal.      Palpations: Abdomen is soft.   Musculoskeletal:         General: Normal range of motion.      Cervical back: Normal range of motion and neck supple.   Skin:     General: Skin is warm and dry.      Capillary Refill: Capillary refill takes less than 2 seconds.   Neurological:      General: No focal deficit present.      Mental Status: She is alert and oriented to person, place, and time. Mental status is at baseline.   Psychiatric:         Mood and Affect: Mood normal.         Behavior: Behavior normal.         Judgment: Judgment normal.              CRANIAL NERVES     CN III, IV, VI   Pupils are equal, round, and reactive to light.       Significant Labs: All pertinent labs within the past 24 hours have been reviewed.  CBC:   Recent Labs   Lab 03/22/24 0221   WBC 13.87*   HGB 12.2   HCT 37.0        CMP:   Recent Labs   Lab 03/22/24 0221   *   K 4.2      CO2 24   *   BUN 18   CREATININE 0.8   CALCIUM 9.3   PROT 7.0  7.0   ALBUMIN 4.1  4.1   BILITOT 0.3  0.3   ALKPHOS 67  67   AST 13  13   ALT 10  10   ANIONGAP 10     Lactic Acid: No results for input(s): "LACTATE" in the last 48 hours.    Significant Imaging: I have reviewed all pertinent imaging results/findings within the past 24 hours.  I have reviewed and interpreted all pertinent imaging results/findings within the past 24 hours.  "

## 2024-03-22 NOTE — PLAN OF CARE
Pt was explained GAMBLE. Pt verbalized understanding of GAMBLE and signed. GAMBLE scanned to .   03/22/24 1624   GAMBLE Message   Medicare Outpatient and Observation Notification regarding financial responsibility Given to patient/caregiver;Explained to patient/caregiver;Signed/date by patient/caregiver   Date GAMBLE was signed 03/22/24   Time GAMBLE was signed [id number]

## 2024-03-22 NOTE — ASSESSMENT & PLAN NOTE
She looks good when I saw her in ER this morning    Vitals are good.   Labs were pretty much normal     I decided to continue with ABX because of the bleeding colitis.    There is chance for bacterial transposition into blood stream .      But I wonder if this was not Food poisoning from the [place] place she went.  But then she said she thinks she felt symptoms for few days actually . So maybe it is true bacterial colitis.      We can sent stools for testing     Most of this is self limiting anyways even without Abx I explained to her       PLAN    - IVFs   LR   - Clear liquid diet  - IV Flagyl and IV Rocephin   ( I wanted to avoid more levaquin given her age and risk of tendonopathy or rupture since she is active )   - pepcid IV Q12  - zofran and phenergan IV PRN nausea  - questran 4 gm po BID  - check labs again this morning , BMP, Mag, CRP ,    keep mag over 2 K over 4  - 0.25 Dilaudid IV if her abdominal pain comes back   - stool studies

## 2024-03-22 NOTE — HPI
"88 WF with pmh of anxiety and not much else.   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    And then diarrhea that had bright red blood in it.   Her pain I abdomen was like there was " glass inside of me " she said.  Sharp pains.     No fever no chills  No sick contacts    But she did go to chinese restaurant earlier that day for lunch.      Then she said actually for about 3 days she noticed some nausea and mild abdominal discomfort that was new for her.       The diarrhea continued though last night and was consistently bloody she said so she came to our ER    In the ER they gave her IVF bolus   And flagyl and Levaquin IV    Her labs were pretty normal   Vitals stable      We are admitting her for this colitis now   "

## 2024-03-22 NOTE — H&P
"  [facility] - Emergency Dept  Hospital Medicine  History & Physical    Patient Name: Selene Posey  MRN: [id number]  Patient Class: OP- Observation  Admission Date: 3/22/2024  Attending Physician: Jagdeep Dumas MD  Primary Care Provider: Alisha Lopez MD         Patient information was obtained from patient and ER records.     Subjective:     Principal Problem:Hemorrhagic colitis    Chief Complaint:   Chief Complaint   Patient presents with    Rectal Bleeding     Started having diarrhea about 2130 last night with abdominal cramping - got up to go to the bathroom multiple times and on the last time she had bright red blood in her stool - nausea but no vomiting        HPI: 88 WF with pmh of anxiety and not much else.   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    And then diarrhea that had bright red blood in it.   Her pain I abdomen was like there was " glass inside of me " she said.  Sharp pains.     No fever no chills  No sick contacts    But she did go to chinese restaurant earlier that day for lunch.      Then she said actually for about 3 days she noticed some nausea and mild abdominal discomfort that was new for her.       The diarrhea continued though last night and was consistently bloody she said so she came to our ER    In the ER they gave her IVF bolus   And flagyl and Levaquin IV    Her labs were pretty normal   Vitals stable      We are admitting her for this colitis now     Past Medical History:   Diagnosis Date    Bronchiectasis     CKD (chronic kidney disease)     COVID-19 virus infection 5/16/2022    GERD (gastroesophageal reflux disease)     High cholesterol     Hypertension     Paroxysmal atrial fibrillation     Thyroid disease        Past Surgical History:   Procedure Laterality Date    APPENDECTOMY      BREAST " SURGERY  1975    EYE SURGERY  August 2022    HYSTERECTOMY      ovaries retained    JOINT REPLACEMENT  2020    knee    TONSILLECTOMY  1944       Review of patient's allergies indicates:   Allergen Reactions    Azithromycin     Codeine     Erythropoietin analogues     Pcn [penicillins]     Sulfa (sulfonamide antibiotics)     Zetia [ezetimibe]     Atorvastatin      Other reaction(s): Joint pain    Fenofibrate      Made patient hoarse.       No current facility-administered medications on file prior to encounter.     Current Outpatient Medications on File Prior to Encounter   Medication Sig    acyclovir (ZOVIRAX) 400 MG tablet Take 400 mg by mouth 2 (two) times daily as needed.    ascorbic acid, vitamin C, (VITAMIN C) 500 MG tablet Take 500 mg by mouth.    aspirin (ECOTRIN) 81 MG EC tablet 81 mg.    clonazePAM (KLONOPIN) 0.5 MG tablet Take 0.5 mg by mouth daily as needed.    fexofenadine (ALLEGRA) 180 MG tablet Take 180 mg by mouth once daily.    fluticasone propionate (FLONASE) 50 mcg/actuation nasal spray 1 spray (50 mcg total) by Each Nostril route once daily.    levothyroxine (SYNTHROID) 88 MCG tablet Take 88 mcg by mouth before breakfast.    lutein 20 mg Cap Take by mouth.    metoprolol succinate (TOPROL-XL) 25 MG 24 hr tablet Take 25 mg by mouth once daily.    omeprazole (PRILOSEC) 40 MG capsule TAKE ONE CAPSULE BY MOUTH EVERY MORNING ON AN EMPTY STOMACH     Family History       Problem Relation (Age of Onset)    Breast cancer Mother (65)    Cancer Mother          Tobacco Use    Smoking status: Never    Smokeless tobacco: Never   Substance and Sexual Activity    Alcohol use: No    Drug use: Never    Sexual activity: Not Currently     Partners: Male     Review of Systems   Constitutional:  Negative for activity change, appetite change, chills and fever.   HENT:  Negative for congestion, hearing loss and tinnitus.    Eyes:  Negative for pain, redness and visual disturbance.   Respiratory:  Negative for apnea, cough,  chest tightness, shortness of breath and wheezing.    Cardiovascular:  Negative for chest pain, palpitations and leg swelling.   Gastrointestinal:  Positive for abdominal pain, anal bleeding, diarrhea, nausea and vomiting. Negative for abdominal distention, blood in stool and constipation.   Genitourinary:  Negative for dysuria.   Musculoskeletal:  Negative for arthralgias, back pain, gait problem, joint swelling and myalgias.   Skin:  Negative for color change and rash.   Neurological:  Negative for dizziness, weakness, light-headedness and headaches.   Hematological:  Does not bruise/bleed easily.   Psychiatric/Behavioral:  Negative for confusion and sleep disturbance. The patient is not nervous/anxious.      Objective:     Vital Signs (Most Recent):  Temp: 97.7 °F (36.5 °C) (03/22/24 0206)  Pulse: 60 (03/22/24 0751)  Resp: 15 (03/22/24 0701)  BP: (!) 167/72 (03/22/24 0731)  SpO2: 99 % (03/22/24 0751) Vital Signs (24h Range):  Temp:  [97.7 °F (36.5 °C)] 97.7 °F (36.5 °C)  Pulse:  [54-67] 60  Resp:  [14-19] 15  SpO2:  [96 %-100 %] 99 %  BP: (154-195)/(71-84) 167/72     Weight: 65.8 kg (145 lb)  Body mass index is 24.13 kg/m².     Physical Exam  Constitutional:       Appearance: Normal appearance.   HENT:      Head: Normocephalic and atraumatic.      Nose: Nose normal.      Mouth/Throat:      Mouth: Mucous membranes are moist.      Pharynx: Oropharynx is clear.   Eyes:      Extraocular Movements: Extraocular movements intact.      Conjunctiva/sclera: Conjunctivae normal.      Pupils: Pupils are equal, round, and reactive to light.   Cardiovascular:      Rate and Rhythm: Normal rate and regular rhythm.      Pulses: Normal pulses.      Heart sounds: Normal heart sounds.   Pulmonary:      Effort: Pulmonary effort is normal.      Breath sounds: Normal breath sounds.   Abdominal:      General: Abdomen is flat. Bowel sounds are normal.      Palpations: Abdomen is soft.   Musculoskeletal:         General: Normal range of  "motion.      Cervical back: Normal range of motion and neck supple.   Skin:     General: Skin is warm and dry.      Capillary Refill: Capillary refill takes less than 2 seconds.   Neurological:      General: No focal deficit present.      Mental Status: She is alert and oriented to person, place, and time. Mental status is at baseline.   Psychiatric:         Mood and Affect: Mood normal.         Behavior: Behavior normal.         Judgment: Judgment normal.              CRANIAL NERVES     CN III, IV, VI   Pupils are equal, round, and reactive to light.       Significant Labs: All pertinent labs within the past 24 hours have been reviewed.  CBC:   Recent Labs   Lab 03/22/24 0221   WBC 13.87*   HGB 12.2   HCT 37.0        CMP:   Recent Labs   Lab 03/22/24 0221   *   K 4.2      CO2 24   *   BUN 18   CREATININE 0.8   CALCIUM 9.3   PROT 7.0  7.0   ALBUMIN 4.1  4.1   BILITOT 0.3  0.3   ALKPHOS 67  67   AST 13  13   ALT 10  10   ANIONGAP 10     Lactic Acid: No results for input(s): "LACTATE" in the last 48 hours.    Significant Imaging: I have reviewed all pertinent imaging results/findings within the past 24 hours.  I have reviewed and interpreted all pertinent imaging results/findings within the past 24 hours.  Assessment/Plan:     * Hemorrhagic colitis  She looks good when I saw her in ER this morning    Vitals are good.   Labs were pretty much normal     I decided to continue with ABX because of the bleeding colitis.    There is chance for bacterial transposition into blood stream .      But I wonder if this was not Food poisoning from the [place] place she went.  But then she said she thinks she felt symptoms for few days actually . So maybe it is true bacterial colitis.      We can sent stools for testing     Most of this is self limiting anyways even without Abx I explained to her       PLAN    - IVFs   LR   - Clear liquid diet  - IV Flagyl and IV Rocephin   ( I wanted to avoid more " levaquin given her age and risk of tendonopathy or rupture since she is active )   - pepcid IV Q12  - zofran and phenergan IV PRN nausea  - questran 4 gm po BID  - check labs again this morning , BMP, Mag, CRP ,    keep mag over 2 K over 4  - 0.25 Dilaudid IV if her abdominal pain comes back   - stool studies         VTE Risk Mitigation (From admission, onward)           Ordered     Reason for No Pharmacological VTE Prophylaxis  Once        Question:  Reasons:  Answer:  Active Bleeding    03/22/24 0547     IP VTE HIGH RISK PATIENT  Once         03/22/24 0547     Place sequential compression device  Until discontinued         03/22/24 0547                       On 03/22/2024, patient should be placed in hospital observation services under my care.        Pharmacist Renal Adjustment Note    Selene Posey is a 88 y.o. female being treated with Famotidine.     Patient Data:    Vital Signs (Most Recent):  Temp: 97.7 °F (36.5 °C) (03/22/24 0206)  Pulse: 61 (03/22/24 0300)  Resp: 19 (03/22/24 0300)  BP: (!) 154/75 (03/22/24 0300)  SpO2: 100 % (03/22/24 0300) Vital Signs (72h Range):  Temp:  [97.7 °F (36.5 °C)]   Pulse:  [61-67]   Resp:  [18-19]   BP: (154-195)/(75-84)   SpO2:  [100 %]      Recent Labs   Lab 03/22/24 0221   CREATININE 0.8     Serum creatinine: 0.8 mg/dL 03/22/24 0221  Estimated creatinine clearance: 45.4 mL/min    Famotidine 20 mg twice a day will be changed to Famotidine 20 mg once daily due to CrCl 10-50 per Renal Dose Adjustment protocol.    Pharmacist's Name: Jocy Alegre  Pharmacist's Extension: [phone]      Jagdeep Dumas MD  Department of Hospital Medicine  [facility] - Emergency Dept

## 2024-03-22 NOTE — NURSING
Nurses Note -- 4 Eyes      3/22/2024   5:47 PM      Skin assessed during: Admit      [x] No Altered Skin Integrity Present    []Prevention Measures Documented      [] Yes- Altered Skin Integrity Present or Discovered   [] LDA Added if Not in Epic (Describe Wound)   [] New Altered Skin Integrity was Present on Admit and Documented in LDA   [] Wound Image Taken    Wound Care Consulted? No    Attending Nurse:  Allie Ndiaye RN/Staff Member:   Tori

## 2024-03-22 NOTE — ED PROVIDER NOTES
Encounter Date: 3/22/2024       History     Chief Complaint   Patient presents with    Rectal Bleeding     Started having diarrhea about 2130 last night with abdominal cramping - got up to go to the bathroom multiple times and on the last time she had bright red blood in her stool - nausea but no vomiting     88-year-old female presented emergency department with abdominal cramping and several episodes of diarrhea this evening and now started having blood.  Patient said she had lot of blood in the last bowel movement.  Patient denies fever or chills or nausea vomiting or chest pain or shortness of breath.  Patient has history of atrial fibrillation.  Patient not on any blood thinners.  Denies dysuria or hematuria.  Denies any weakness or numbness or fever chills      Review of patient's allergies indicates:   Allergen Reactions    Azithromycin     Codeine     Erythropoietin analogues     Pcn [penicillins]     Sulfa (sulfonamide antibiotics)     Zetia [ezetimibe]     Atorvastatin      Other reaction(s): Joint pain    Fenofibrate      Made patient hoarse.     Past Medical History:   Diagnosis Date    Bronchiectasis     CKD (chronic kidney disease)     COVID-19 virus infection 5/16/2022    GERD (gastroesophageal reflux disease)     High cholesterol     Hypertension     Paroxysmal atrial fibrillation     Thyroid disease      Past Surgical History:   Procedure Laterality Date    APPENDECTOMY      BREAST SURGERY  1975    EYE SURGERY  August 2022    HYSTERECTOMY      ovaries retained    JOINT REPLACEMENT  2020    knee    TONSILLECTOMY  1944     Family History   Problem Relation Age of Onset    Breast cancer Mother 65    Cancer Mother      Social History     Tobacco Use    Smoking status: Never    Smokeless tobacco: Never   Substance Use Topics    Alcohol use: No    Drug use: Never     Review of Systems   Constitutional: Negative.    HENT: Negative.     Eyes: Negative.    Respiratory: Negative.     Cardiovascular: Negative.   Negative for chest pain.   Gastrointestinal:  Positive for abdominal pain, blood in stool and diarrhea.   Endocrine: Negative.    Genitourinary: Negative.    Musculoskeletal: Negative.    Skin: Negative.    Allergic/Immunologic: Negative.    Neurological: Negative.    Hematological: Negative.    Psychiatric/Behavioral: Negative.     All other systems reviewed and are negative.      Physical Exam     Initial Vitals [03/22/24 0206]   BP Pulse Resp Temp SpO2   (!) 195/84 67 18 97.7 °F (36.5 °C) 100 %      MAP       --         Physical Exam    Nursing note and vitals reviewed.  Constitutional: She appears well-developed and well-nourished.   HENT:   Head: Normocephalic and atraumatic.   Nose: Nose normal.   Mouth/Throat: Oropharynx is clear and moist.   Eyes: Conjunctivae and EOM are normal. Pupils are equal, round, and reactive to light.   Neck: Neck supple. No thyromegaly present. No tracheal deviation present. No JVD present.   Normal range of motion.  Cardiovascular:  Normal rate, regular rhythm, normal heart sounds and intact distal pulses.           No murmur heard.  Pulmonary/Chest: Breath sounds normal. No stridor. No respiratory distress. She has no wheezes. She has no rales.   Abdominal: Abdomen is soft. Bowel sounds are normal. There is abdominal tenderness.   Diffuse left lower abdominal tenderness   Genitourinary:    Genitourinary Comments: No stool sample obtained on rectal exam     Musculoskeletal:         General: No edema. Normal range of motion.      Cervical back: Normal range of motion and neck supple.     Neurological: She is alert and oriented to person, place, and time.   Skin: Skin is warm. Capillary refill takes less than 2 seconds.   Psychiatric: She has a normal mood and affect. Thought content normal.         ED Course   Procedures  Labs Reviewed   CBC W/ AUTO DIFFERENTIAL - Abnormal; Notable for the following components:       Result Value    WBC 13.87 (*)     MPV 9.0 (*)     Gran # (ANC)  9.8 (*)     Immature Grans (Abs) 0.06 (*)     Mono # 1.2 (*)     Lymph % 17.8 (*)     All other components within normal limits   COMPREHENSIVE METABOLIC PANEL - Abnormal; Notable for the following components:    Sodium 134 (*)     Glucose 121 (*)     All other components within normal limits   PROTIME-INR   HEPATIC FUNCTION PANEL   OCCULT BLOOD X 1, STOOL   TROPONIN I HIGH SENSITIVITY   B-TYPE NATRIURETIC PEPTIDE   TYPE & SCREEN   ISTAT CREATININE   POCT CREATININE          Imaging Results              CT Abdomen Pelvis With IV Contrast NO Oral Contrast (Final result)  Result time 03/22/24 04:15:27      Final result by Chris Harry MD (03/22/24 04:15:27)                   Narrative:    EXAM: CT Abdomen and Pelvis with contrast    INDICATION:  Abdominal abscess/infection suspected    TECHNIQUE: Helical CT of the abdomen and pelvis was obtained from the diaphragm through the ischial tuberosities using contrast. Axial, coronal and sagittal images were obtained.    Dose reduction techniques were used including automated exposure control and/or adjustment of the mA and/or kV according to patient size.    COMPARISON: CT chest 3/8/2021    FINDINGS:  LUNG BASES: Bibasilar bronchiectasis and subpleural banding/reticular changes. No honeycombing.    STOMACH/DUODENUM: No significant finding.    LIVER: No significant abnormality.    BILIARY: No significant abnormality.    PANCREAS: No significant abnormality.    SPLEEN: No significant abnormality.    ADRENAL GLANDS: No significant abnormality.    KIDNEYS/BLADDER:  6.4 cm right renal cyst. No suspicious renal mass or hydronephrosis. Left renal cortical calcification seen around series 3 image 74.    VESSELS: Nonaneurysmal abdominal aorta.    LYMPH NODES: No enlarged abdominal or pelvic lymph nodes.    OTHER PELVIC: No free pelvic fluid.    GI TRACT: There is mild wall thickening and adjacent inflammation seen at the descending colon and splenic flexure around series 3  image 59, most consistent with infectious/inflammatory colitis ischemia is felt to be less likely. No bowel obstruction.    ABDOMINAL WALL: No significant abnormality.    PERITONEUM/MESENTERY: No pneumoperitoneum.    BONES/SPINE: Lower lumbar facet arthrosis. Multilevel degenerative changes. Grade 1 anterolisthesis of L4 on L5. No acute osseous finding.      IMPRESSION:  1.  Findings most consistent with infectious/inflammatory colitis. Ischemia remains in the differential given location, however is felt to be less likely.  2.  No other acute findings in the abdomen or pelvis.  3.  Right Bosniak I benign renal cyst measuring 6.4 cm. No follow-up imaging is recommended. JACR 2018 Feb; 264-273, Management of the Incidental Renal Mass on CT, [employer] 2021; 814-848, Bosniak Classification of Cystic Renal Masses, Version 2019.    Electronically signed by:  Chris Harry MD  03/22/2024 04:15 AM CDT Workstation: [id number]                                     Medications   levoFLOXacin 500 mg/100 mL IVPB 500 mg (has no administration in time range)   metronidazole IVPB 500 mg (has no administration in time range)   sodium chloride 0.9% bolus 1,000 mL 1,000 mL (1,000 mLs Intravenous New Bag 3/22/24 0221)   pantoprazole injection 80 mg (80 mg Intravenous Given 3/22/24 0222)   iohexoL (OMNIPAQUE 350) injection 100 mL (100 mLs Intravenous Given 3/22/24 0326)     Medical Decision Making  88-year-old female with left lower abdominal pain and rectal bleeding.  Patient had several episodes of loose stool and then started bleeding.  Patient does have evidence of hemorrhagic colitis based on CT scan finding showing colitis.  Patient has slight leukocytosis and given patient's age and presentation started on antibiotic and transferred to [facility] for admission and supportive care and symptomatic treatment and continuation of antibiotics.  Ischemic colitis is in the differential however I believe this is infectious colitis.   Hospital Medicine consulted for evaluation for admission.  Nurse practitioner Marquis accepted the patient for admission    Amount and/or Complexity of Data Reviewed  Labs: ordered. Decision-making details documented in ED Course.  Radiology: ordered. Decision-making details documented in ED Course.    Risk  Prescription drug management.  Decision regarding hospitalization.                                      Clinical Impression:  Final diagnoses:  [K92.2] GI bleed  [K52.9] Acute hemorrhagic colitis (Primary)          ED Disposition Condition    Admit Jerica Krishnamurthy MD  03/22/24 3124

## 2024-03-22 NOTE — Clinical Note
Diagnosis: Acute hemorrhagic colitis [641156]   Future Attending Provider: MICHAEL MALAVE [[id number]]   Place in Observation: [facility] [[id number]]

## 2024-03-22 NOTE — PROGRESS NOTES
Pharmacist Renal Adjustment Note    Selene Posey is a 88 y.o. female being treated with Famotidine.     Patient Data:    Vital Signs (Most Recent):  Temp: 97.7 °F (36.5 °C) (03/22/24 0206)  Pulse: 61 (03/22/24 0300)  Resp: 19 (03/22/24 0300)  BP: (!) 154/75 (03/22/24 0300)  SpO2: 100 % (03/22/24 0300) Vital Signs (72h Range):  Temp:  [97.7 °F (36.5 °C)]   Pulse:  [61-67]   Resp:  [18-19]   BP: (154-195)/(75-84)   SpO2:  [100 %]      Recent Labs   Lab 03/22/24 0221   CREATININE 0.8     Serum creatinine: 0.8 mg/dL 03/22/24 0221  Estimated creatinine clearance: 45.4 mL/min    Famotidine 20 mg twice a day will be changed to Famotidine 20 mg once daily due to CrCl 10-50 per Renal Dose Adjustment protocol.    Pharmacist's Name: Jocy Alegre  Pharmacist's Extension: [phone]

## 2024-03-23 LAB
ALBUMIN SERPL BCP-MCNC: 3.4 G/DL (ref 3.5–5.2)
ALP SERPL-CCNC: 64 U/L (ref 55–135)
ALT SERPL W/O P-5'-P-CCNC: 9 U/L (ref 10–44)
ANION GAP SERPL CALC-SCNC: 7 MMOL/L (ref 8–16)
AST SERPL-CCNC: 13 U/L (ref 10–40)
BASOPHILS # BLD AUTO: 0.04 K/UL (ref 0–0.2)
BASOPHILS NFR BLD: 0.4 % (ref 0–1.9)
BILIRUB SERPL-MCNC: 0.4 MG/DL (ref 0.1–1)
BUN SERPL-MCNC: 7 MG/DL (ref 8–23)
CALCIUM SERPL-MCNC: 8.9 MG/DL (ref 8.7–10.5)
CHLORIDE SERPL-SCNC: 107 MMOL/L (ref 95–110)
CO2 SERPL-SCNC: 27 MMOL/L (ref 23–29)
CREAT SERPL-MCNC: 0.6 MG/DL (ref 0.5–1.4)
DIFFERENTIAL METHOD BLD: ABNORMAL
EOSINOPHIL # BLD AUTO: 0.8 K/UL (ref 0–0.5)
EOSINOPHIL NFR BLD: 8.2 % (ref 0–8)
ERYTHROCYTE [DISTWIDTH] IN BLOOD BY AUTOMATED COUNT: 13.2 % (ref 11.5–14.5)
ERYTHROCYTE [DISTWIDTH] IN BLOOD BY AUTOMATED COUNT: 13.3 % (ref 11.5–14.5)
EST. GFR  (NO RACE VARIABLE): >60 ML/MIN/1.73 M^2
GLUCOSE SERPL-MCNC: 118 MG/DL (ref 70–110)
GLUCOSE SERPL-MCNC: 89 MG/DL (ref 70–110)
HCT VFR BLD AUTO: 33.2 % (ref 37–48.5)
HCT VFR BLD AUTO: 34.7 % (ref 37–48.5)
HGB BLD-MCNC: 10.9 G/DL (ref 12–16)
HGB BLD-MCNC: 11 G/DL (ref 12–16)
IMM GRANULOCYTES # BLD AUTO: 0.04 K/UL (ref 0–0.04)
IMM GRANULOCYTES NFR BLD AUTO: 0.4 % (ref 0–0.5)
LYMPHOCYTES # BLD AUTO: 3.2 K/UL (ref 1–4.8)
LYMPHOCYTES NFR BLD: 34.5 % (ref 18–48)
MAGNESIUM SERPL-MCNC: 1.9 MG/DL (ref 1.6–2.6)
MCH RBC QN AUTO: 29.2 PG (ref 27–31)
MCH RBC QN AUTO: 29.3 PG (ref 27–31)
MCHC RBC AUTO-ENTMCNC: 31.7 G/DL (ref 32–36)
MCHC RBC AUTO-ENTMCNC: 32.8 G/DL (ref 32–36)
MCV RBC AUTO: 89 FL (ref 82–98)
MCV RBC AUTO: 92 FL (ref 82–98)
MONOCYTES # BLD AUTO: 0.8 K/UL (ref 0.3–1)
MONOCYTES NFR BLD: 8.3 % (ref 4–15)
NEUTROPHILS # BLD AUTO: 4.4 K/UL (ref 1.8–7.7)
NEUTROPHILS NFR BLD: 48.2 % (ref 38–73)
NRBC BLD-RTO: 0 /100 WBC
PLATELET # BLD AUTO: 276 K/UL (ref 150–450)
PLATELET # BLD AUTO: 290 K/UL (ref 150–450)
PMV BLD AUTO: 9 FL (ref 9.2–12.9)
PMV BLD AUTO: 9.1 FL (ref 9.2–12.9)
POTASSIUM SERPL-SCNC: 3.6 MMOL/L (ref 3.5–5.1)
PROT SERPL-MCNC: 5.9 G/DL (ref 6–8.4)
RBC # BLD AUTO: 3.73 M/UL (ref 4–5.4)
RBC # BLD AUTO: 3.76 M/UL (ref 4–5.4)
SODIUM SERPL-SCNC: 141 MMOL/L (ref 136–145)
WBC # BLD AUTO: 10.84 K/UL (ref 3.9–12.7)
WBC # BLD AUTO: 9.23 K/UL (ref 3.9–12.7)
WBC #/AREA STL HPF: NORMAL /[HPF]

## 2024-03-23 PROCEDURE — 85027 COMPLETE CBC AUTOMATED: CPT | Mod: 59 | Performed by: INTERNAL MEDICINE

## 2024-03-23 PROCEDURE — 85025 COMPLETE CBC W/AUTO DIFF WBC: CPT | Performed by: INTERNAL MEDICINE

## 2024-03-23 PROCEDURE — 87449 NOS EACH ORGANISM AG IA: CPT | Performed by: INTERNAL MEDICINE

## 2024-03-23 PROCEDURE — 87507 IADNA-DNA/RNA PROBE TQ 12-25: CPT | Performed by: INTERNAL MEDICINE

## 2024-03-23 PROCEDURE — 96376 TX/PRO/DX INJ SAME DRUG ADON: CPT

## 2024-03-23 PROCEDURE — 83735 ASSAY OF MAGNESIUM: CPT | Performed by: INTERNAL MEDICINE

## 2024-03-23 PROCEDURE — 80053 COMPREHEN METABOLIC PANEL: CPT | Performed by: INTERNAL MEDICINE

## 2024-03-23 PROCEDURE — 87046 STOOL CULTR AEROBIC BACT EA: CPT | Mod: 59 | Performed by: INTERNAL MEDICINE

## 2024-03-23 PROCEDURE — 36415 COLL VENOUS BLD VENIPUNCTURE: CPT | Performed by: INTERNAL MEDICINE

## 2024-03-23 PROCEDURE — 63600175 PHARM REV CODE 636 W HCPCS: Performed by: INTERNAL MEDICINE

## 2024-03-23 PROCEDURE — 96366 THER/PROPH/DIAG IV INF ADDON: CPT

## 2024-03-23 PROCEDURE — 87045 FECES CULTURE AEROBIC BACT: CPT | Performed by: INTERNAL MEDICINE

## 2024-03-23 PROCEDURE — 25000003 PHARM REV CODE 250: Performed by: INTERNAL MEDICINE

## 2024-03-23 PROCEDURE — 96361 HYDRATE IV INFUSION ADD-ON: CPT

## 2024-03-23 PROCEDURE — 12000002 HC ACUTE/MED SURGE SEMI-PRIVATE ROOM

## 2024-03-23 PROCEDURE — 87040 BLOOD CULTURE FOR BACTERIA: CPT | Mod: 59 | Performed by: STUDENT IN AN ORGANIZED HEALTH CARE EDUCATION/TRAINING PROGRAM

## 2024-03-23 PROCEDURE — 89055 LEUKOCYTE ASSESSMENT FECAL: CPT | Performed by: INTERNAL MEDICINE

## 2024-03-23 RX ADMIN — METRONIDAZOLE 500 MG: 500 INJECTION, SOLUTION INTRAVENOUS at 04:03

## 2024-03-23 RX ADMIN — LEVOTHYROXINE SODIUM 88 MCG: 88 TABLET ORAL at 06:03

## 2024-03-23 RX ADMIN — METOPROLOL SUCCINATE 25 MG: 25 TABLET, EXTENDED RELEASE ORAL at 08:03

## 2024-03-23 RX ADMIN — ASPIRIN 81 MG: 81 TABLET, COATED ORAL at 08:03

## 2024-03-23 RX ADMIN — POTASSIUM BICARBONATE 50 MEQ: 977.5 TABLET, EFFERVESCENT ORAL at 09:03

## 2024-03-23 RX ADMIN — METRONIDAZOLE 500 MG: 500 INJECTION, SOLUTION INTRAVENOUS at 01:03

## 2024-03-23 RX ADMIN — CEFTRIAXONE SODIUM 1 G: 1 INJECTION, POWDER, FOR SOLUTION INTRAMUSCULAR; INTRAVENOUS at 08:03

## 2024-03-23 RX ADMIN — FAMOTIDINE 20 MG: 10 INJECTION INTRAVENOUS at 08:03

## 2024-03-23 RX ADMIN — METRONIDAZOLE 500 MG: 500 INJECTION, SOLUTION INTRAVENOUS at 08:03

## 2024-03-23 RX ADMIN — SODIUM CHLORIDE, POTASSIUM CHLORIDE, SODIUM LACTATE AND CALCIUM CHLORIDE: 600; 310; 30; 20 INJECTION, SOLUTION INTRAVENOUS at 04:03

## 2024-03-23 NOTE — SUBJECTIVE & OBJECTIVE
Interval History:   Patient reported she still continues to have blood in stool, denied abdominal pain chest pain or shortness of breath, H&H stable. Will consult GI for evaluation and recommendations. Patient reported she is hungry and requested to advance diet.  Stool cultures pending.  On IV ceftriaxone and metronidazole for colitis.    Review of Systems   Constitutional:  Negative for fatigue and fever.   HENT:  Negative for congestion, rhinorrhea, sinus pressure and sinus pain.    Eyes:  Negative for photophobia, redness and visual disturbance.   Respiratory:  Negative for chest tightness, shortness of breath, wheezing and stridor.    Cardiovascular:  Negative for chest pain, palpitations and leg swelling.   Gastrointestinal:  Positive for blood in stool and diarrhea. Negative for abdominal pain and constipation.   Endocrine: Negative for polydipsia, polyphagia and polyuria.   Genitourinary:  Negative for dysuria and urgency.   Musculoskeletal:  Negative for gait problem, myalgias and neck pain.   Skin:  Negative for pallor, rash and wound.   Neurological:  Negative for dizziness, numbness and headaches.   Psychiatric/Behavioral:  Negative for confusion and hallucinations. The patient is not nervous/anxious.      Objective:     Vital Signs (Most Recent):  Temp: 97.8 °F (36.6 °C) (03/23/24 1214)  Pulse: 73 (03/23/24 1214)  Resp: 18 (03/23/24 1214)  BP: 130/60 (03/23/24 1214)  SpO2: 97 % (03/23/24 1214) Vital Signs (24h Range):  Temp:  [97.6 °F (36.4 °C)-98.2 °F (36.8 °C)] 97.8 °F (36.6 °C)  Pulse:  [59-73] 73  Resp:  [11-20] 18  SpO2:  [94 %-100 %] 97 %  BP: (119-174)/(59-76) 130/60     Weight: 65.7 kg (144 lb 14.4 oz)  Body mass index is 24.49 kg/m².    Intake/Output Summary (Last 24 hours) at 3/23/2024 1326  Last data filed at 3/23/2024 1238  Gross per 24 hour   Intake 960 ml   Output 500 ml   Net 460 ml           Physical Exam  Constitutional:       General: She is not in acute distress.     Appearance: She  is not ill-appearing, toxic-appearing or diaphoretic.   HENT:      Head: Normocephalic and atraumatic.      Right Ear: External ear normal.      Left Ear: External ear normal.      Nose: No congestion or rhinorrhea.   Eyes:      General: No scleral icterus.        Right eye: No discharge.         Left eye: No discharge.   Cardiovascular:      Rate and Rhythm: Normal rate and regular rhythm.      Heart sounds: No murmur heard.  Pulmonary:      Effort: No respiratory distress.      Breath sounds: No stridor. No wheezing, rhonchi or rales.   Chest:      Chest wall: No tenderness.   Abdominal:      General: There is no distension.      Palpations: There is no mass.      Tenderness: There is no abdominal tenderness. There is no guarding or rebound.      Hernia: No hernia is present.   Musculoskeletal:         General: No swelling, tenderness, deformity or signs of injury.      Right lower leg: No edema.   Skin:     Coloration: Skin is not jaundiced or pale.      Findings: No bruising, erythema, lesion or rash.   Neurological:      Mental Status: She is alert and oriented to person, place, and time. Mental status is at baseline.      Motor: No weakness.             Significant Labs: All pertinent labs within the past 24 hours have been reviewed.  CBC:   Recent Labs   Lab 03/22/24  0221 03/23/24  0613   WBC 13.87* 9.23   HGB 12.2 10.9*   HCT 37.0 33.2*    290       CMP:   Recent Labs   Lab 03/22/24  0221 03/22/24  0801 03/23/24  0613   * 138 141   K 4.2 4.0 3.6    106 107   CO2 24 26 27   * 96 89   BUN 18 14 7*   CREATININE 0.8 0.7 0.6   CALCIUM 9.3 9.0 8.9   PROT 7.0  7.0  --  5.9*   ALBUMIN 4.1  4.1  --  3.4*   BILITOT 0.3  0.3  --  0.4   ALKPHOS 67  67  --  64   AST 13  13  --  13   ALT 10  10  --  9*   ANIONGAP 10 6* 7*         Significant Imaging: I have reviewed all pertinent imaging results/findings within the past 24 hours.

## 2024-03-23 NOTE — NURSING
Patients brief was noted with a large amount of bright red blood noted. Dr Melendez on unit and notified of blood in brief.

## 2024-03-23 NOTE — ASSESSMENT & PLAN NOTE
Patient reported she still continues to have blood in stool,  H&H stable  advance diet as tolerated   Stool cultures pending  On IV ceftriaxone and metronidazole for colitis  Will consult GI for evaluation and recommendations

## 2024-03-23 NOTE — ASSESSMENT & PLAN NOTE
Patient continues to have some bloody stools  Will trial Patient on more solid food  Will continue IV abx & IVF  Stool Cultures Pending

## 2024-03-23 NOTE — ASSESSMENT & PLAN NOTE
Chronic, controlled. Latest blood pressure and vitals reviewed-     Temp:  [97.6 °F (36.4 °C)-98.2 °F (36.8 °C)]   Pulse:  [59-73]   Resp:  [11-20]   BP: (119-174)/(59-76)   SpO2:  [94 %-100 %] .   Home meds for hypertension were reviewed and noted below.   Hypertension Medications               metoprolol succinate (TOPROL-XL) 25 MG 24 hr tablet Take 25 mg by mouth once daily.            While in the hospital, will manage blood pressure as follows; Continue home antihypertensive regimen    Will utilize p.r.n. blood pressure medication only if patient's blood pressure greater than 180/110 and she develops symptoms such as worsening chest pain or shortness of breath.

## 2024-03-23 NOTE — PROGRESS NOTES
"[facility] Medicine  Progress Note    Patient Name: Selene Posey  MRN: [id number]  Patient Class: OP- Observation   Admission Date: 3/22/2024  Length of Stay: 0 days  Attending Physician: Ross Benitez MD  Primary Care Provider: Alisha Lopez MD        Subjective:     Principal Problem:Hemorrhagic colitis        HPI:  88 WF with pmh of anxiety and not much else.   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    And then diarrhea that had bright red blood in it.   Her pain I abdomen was like there was " glass inside of me " she said.  Sharp pains.     No fever no chills  No sick contacts    But she did go to chinese restaurant earlier that day for lunch.      Then she said actually for about 3 days she noticed some nausea and mild abdominal discomfort that was new for her.       The diarrhea continued though last night and was consistently bloody she said so she came to our ER    In the ER they gave her IVF bolus   And flagyl and Levaquin IV    Her labs were pretty normal   Vitals stable      We are admitting her for this colitis now     Overview/Hospital Course:  No notes on file    Interval History:   Patient had improvement in her abdominal pain, however she states that her diarrhea with blood in it continues. She will attempt more solid food     Review of Systems   Constitutional:  Negative for diaphoresis, fatigue and fever.   HENT:  Negative for congestion, rhinorrhea, sinus pressure and sinus pain.    Eyes:  Negative for photophobia, redness and visual disturbance.   Respiratory:  Negative for chest tightness, shortness of breath, wheezing and stridor.    Cardiovascular:  Negative for chest pain, palpitations and leg swelling.   Gastrointestinal:  Positive for blood in stool and diarrhea. Negative for abdominal pain " and constipation.   Endocrine: Negative for polydipsia, polyphagia and polyuria.   Genitourinary:  Negative for dysuria and urgency.   Musculoskeletal:  Negative for gait problem, myalgias and neck pain.   Skin:  Negative for pallor, rash and wound.   Neurological:  Negative for dizziness, numbness and headaches.   Psychiatric/Behavioral:  Negative for confusion and hallucinations. The patient is not nervous/anxious.      Objective:     Vital Signs (Most Recent):  Temp: 97.7 °F (36.5 °C) (03/22/24 1727)  Pulse: 65 (03/22/24 1745)  Resp: 18 (03/22/24 1727)  BP: (!) 160/71 (03/22/24 1745)  SpO2: 96 % (03/22/24 1727) Vital Signs (24h Range):  Temp:  [97.7 °F (36.5 °C)] 97.7 °F (36.5 °C)  Pulse:  [54-70] 65  Resp:  [11-20] 18  SpO2:  [96 %-100 %] 96 %  BP: (142-195)/(58-84) 160/71     Weight: 65.7 kg (144 lb 14.4 oz)  Body mass index is 24.49 kg/m².    Intake/Output Summary (Last 24 hours) at 3/22/2024 1856  Last data filed at 3/22/2024 1838  Gross per 24 hour   Intake 679 ml   Output --   Net 679 ml         Physical Exam  Constitutional:       General: She is not in acute distress.     Appearance: She is not ill-appearing, toxic-appearing or diaphoretic.   HENT:      Head: Normocephalic and atraumatic.      Right Ear: External ear normal.      Left Ear: External ear normal.      Nose: No congestion or rhinorrhea.   Eyes:      General: No scleral icterus.        Right eye: No discharge.         Left eye: No discharge.   Cardiovascular:      Rate and Rhythm: Normal rate and regular rhythm.      Heart sounds: No murmur heard.  Pulmonary:      Effort: No respiratory distress.      Breath sounds: No stridor. No wheezing, rhonchi or rales.   Chest:      Chest wall: No tenderness.   Abdominal:      General: There is no distension.      Palpations: There is no mass.      Tenderness: There is no abdominal tenderness. There is no guarding or rebound.      Hernia: No hernia is present.   Musculoskeletal:         General: No  swelling, tenderness, deformity or signs of injury.      Right lower leg: No edema.   Skin:     Coloration: Skin is not jaundiced or pale.      Findings: No bruising, erythema, lesion or rash.   Neurological:      Mental Status: She is alert and oriented to person, place, and time. Mental status is at baseline.      Motor: No weakness.             Significant Labs: All pertinent labs within the past 24 hours have been reviewed.  CBC:   Recent Labs   Lab 03/22/24 0221   WBC 13.87*   HGB 12.2   HCT 37.0        CMP:   Recent Labs   Lab 03/22/24 0221 03/22/24  0801   * 138   K 4.2 4.0    106   CO2 24 26   * 96   BUN 18 14   CREATININE 0.8 0.7   CALCIUM 9.3 9.0   PROT 7.0  7.0  --    ALBUMIN 4.1  4.1  --    BILITOT 0.3  0.3  --    ALKPHOS 67  67  --    AST 13  13  --    ALT 10  10  --    ANIONGAP 10 6*       Significant Imaging: I have reviewed all pertinent imaging results/findings within the past 24 hours.    Assessment/Plan:      * Hemorrhagic colitis  Patient continues to have some bloody stools  Will trial Patient on more solid food  Will continue IV abx & IVF  Stool Cultures Pending             VTE Risk Mitigation (From admission, onward)           Ordered     Reason for No Pharmacological VTE Prophylaxis  Once        Question:  Reasons:  Answer:  Active Bleeding    03/22/24 0547     IP VTE HIGH RISK PATIENT  Once         03/22/24 0547     Place sequential compression device  Until discontinued         03/22/24 0547                    Discharge Planning   ELIZABETH:      Code Status: Full Code   Is the patient medically ready for discharge?:     Reason for patient still in hospital (select all that apply): Treatment  Discharge Plan A: Home                  Ross Benitez MD  Department of Hospital Medicine   [facility]

## 2024-03-23 NOTE — PROGRESS NOTES
"[facility]  Progress Note    Patient Name: Selene Posey  MRN: [id number]  Patient Class: OP- Observation   Admission Date: 3/22/2024  Length of Stay: 0 days  Attending Physician: Victor M Melendez MD  Primary Care Provider: Alisha Lopez MD        Subjective:     Principal Problem:Hemorrhagic colitis        HPI:  88 WF with pmh of anxiety and not much else.   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    And then diarrhea that had bright red blood in it.   Her pain I abdomen was like there was " glass inside of me " she said.  Sharp pains.     No fever no chills  No sick contacts    But she did go to chinese restaurant earlier that day for lunch.      Then she said actually for about 3 days she noticed some nausea and mild abdominal discomfort that was new for her.       The diarrhea continued though last night and was consistently bloody she said so she came to our ER    In the ER they gave her IVF bolus   And flagyl and Levaquin IV    Her labs were pretty normal   Vitals stable      We are admitting her for this colitis now     Overview/Hospital Course:  Patient was admitted for bloody Bms, Patient was continued with Abx and IVF. Patient's CT in the ED had some concerns for Ischemic Colitis, however findings were more in line with Infectious Colitis. Patient began to feel better, however her bloody stools continued. Will continue to advance diet until Patient can tolerate Solid Food, Stool Studies ordered and pending for the Patient     Interval History:   Patient reported she still continues to have blood in stool, denied abdominal pain chest pain or shortness of breath, H&H stable. Will consult GI for evaluation and recommendations. Patient reported she is hungry and requested to advance diet.  Stool cultures " pending.  On IV ceftriaxone and metronidazole for colitis.    Review of Systems   Constitutional:  Negative for fatigue and fever.   HENT:  Negative for congestion, rhinorrhea, sinus pressure and sinus pain.    Eyes:  Negative for photophobia, redness and visual disturbance.   Respiratory:  Negative for chest tightness, shortness of breath, wheezing and stridor.    Cardiovascular:  Negative for chest pain, palpitations and leg swelling.   Gastrointestinal:  Positive for blood in stool and diarrhea. Negative for abdominal pain and constipation.   Endocrine: Negative for polydipsia, polyphagia and polyuria.   Genitourinary:  Negative for dysuria and urgency.   Musculoskeletal:  Negative for gait problem, myalgias and neck pain.   Skin:  Negative for pallor, rash and wound.   Neurological:  Negative for dizziness, numbness and headaches.   Psychiatric/Behavioral:  Negative for confusion and hallucinations. The patient is not nervous/anxious.      Objective:     Vital Signs (Most Recent):  Temp: 97.8 °F (36.6 °C) (03/23/24 1214)  Pulse: 73 (03/23/24 1214)  Resp: 18 (03/23/24 1214)  BP: 130/60 (03/23/24 1214)  SpO2: 97 % (03/23/24 1214) Vital Signs (24h Range):  Temp:  [97.6 °F (36.4 °C)-98.2 °F (36.8 °C)] 97.8 °F (36.6 °C)  Pulse:  [59-73] 73  Resp:  [11-20] 18  SpO2:  [94 %-100 %] 97 %  BP: (119-174)/(59-76) 130/60     Weight: 65.7 kg (144 lb 14.4 oz)  Body mass index is 24.49 kg/m².    Intake/Output Summary (Last 24 hours) at 3/23/2024 1326  Last data filed at 3/23/2024 1238  Gross per 24 hour   Intake 960 ml   Output 500 ml   Net 460 ml           Physical Exam  Constitutional:       General: She is not in acute distress.     Appearance: She is not ill-appearing, toxic-appearing or diaphoretic.   HENT:      Head: Normocephalic and atraumatic.      Right Ear: External ear normal.      Left Ear: External ear normal.      Nose: No congestion or rhinorrhea.   Eyes:      General: No scleral icterus.        Right eye: No  discharge.         Left eye: No discharge.   Cardiovascular:      Rate and Rhythm: Normal rate and regular rhythm.      Heart sounds: No murmur heard.  Pulmonary:      Effort: No respiratory distress.      Breath sounds: No stridor. No wheezing, rhonchi or rales.   Chest:      Chest wall: No tenderness.   Abdominal:      General: There is no distension.      Palpations: There is no mass.      Tenderness: There is no abdominal tenderness. There is no guarding or rebound.      Hernia: No hernia is present.   Musculoskeletal:         General: No swelling, tenderness, deformity or signs of injury.      Right lower leg: No edema.   Skin:     Coloration: Skin is not jaundiced or pale.      Findings: No bruising, erythema, lesion or rash.   Neurological:      Mental Status: She is alert and oriented to person, place, and time. Mental status is at baseline.      Motor: No weakness.             Significant Labs: All pertinent labs within the past 24 hours have been reviewed.  CBC:   Recent Labs   Lab 03/22/24 0221 03/23/24  0613   WBC 13.87* 9.23   HGB 12.2 10.9*   HCT 37.0 33.2*    290       CMP:   Recent Labs   Lab 03/22/24 0221 03/22/24  0801 03/23/24  0613   * 138 141   K 4.2 4.0 3.6    106 107   CO2 24 26 27   * 96 89   BUN 18 14 7*   CREATININE 0.8 0.7 0.6   CALCIUM 9.3 9.0 8.9   PROT 7.0  7.0  --  5.9*   ALBUMIN 4.1  4.1  --  3.4*   BILITOT 0.3  0.3  --  0.4   ALKPHOS 67  67  --  64   AST 13  13  --  13   ALT 10  10  --  9*   ANIONGAP 10 6* 7*         Significant Imaging: I have reviewed all pertinent imaging results/findings within the past 24 hours.    Assessment/Plan:      * Hemorrhagic colitis  Patient reported she still continues to have blood in stool,  H&H stable  advance diet as tolerated   Stool cultures pending  On IV ceftriaxone and metronidazole for colitis  Will consult GI for evaluation and recommendations          Essential hypertension  Chronic, controlled. Latest  blood pressure and vitals reviewed-     Temp:  [97.6 °F (36.4 °C)-98.2 °F (36.8 °C)]   Pulse:  [59-73]   Resp:  [11-20]   BP: (119-174)/(59-76)   SpO2:  [94 %-100 %] .   Home meds for hypertension were reviewed and noted below.   Hypertension Medications               metoprolol succinate (TOPROL-XL) 25 MG 24 hr tablet Take 25 mg by mouth once daily.            While in the hospital, will manage blood pressure as follows; Continue home antihypertensive regimen    Will utilize p.r.n. blood pressure medication only if patient's blood pressure greater than 180/110 and she develops symptoms such as worsening chest pain or shortness of breath.    Acquired hypothyroidism  Home med levothyroxine         VTE Risk Mitigation (From admission, onward)           Ordered     Reason for No Pharmacological VTE Prophylaxis  Once        Question:  Reasons:  Answer:  Active Bleeding    03/22/24 0547     IP VTE HIGH RISK PATIENT  Once         03/22/24 0547     Place sequential compression device  Until discontinued         03/22/24 0547                    Discharge Planning   ELIZABETH:      Code Status: Full Code   Is the patient medically ready for discharge?:     Reason for patient still in hospital (select all that apply): Treatment  Discharge Plan A: Home                  Victor M Melendez MD  Department of Hospital Medicine   [facility]

## 2024-03-24 VITALS
WEIGHT: 144.88 LBS | HEIGHT: 65 IN | OXYGEN SATURATION: 96 % | BODY MASS INDEX: 24.14 KG/M2 | RESPIRATION RATE: 17 BRPM | HEART RATE: 60 BPM | TEMPERATURE: 98 F | DIASTOLIC BLOOD PRESSURE: 69 MMHG | SYSTOLIC BLOOD PRESSURE: 143 MMHG

## 2024-03-24 LAB
ALBUMIN SERPL BCP-MCNC: 3.4 G/DL (ref 3.5–5.2)
ALP SERPL-CCNC: 58 U/L (ref 55–135)
ALT SERPL W/O P-5'-P-CCNC: 8 U/L (ref 10–44)
ANION GAP SERPL CALC-SCNC: 7 MMOL/L (ref 8–16)
AST SERPL-CCNC: 11 U/L (ref 10–40)
BASOPHILS # BLD AUTO: 0.05 K/UL (ref 0–0.2)
BASOPHILS NFR BLD: 0.5 % (ref 0–1.9)
BILIRUB SERPL-MCNC: 0.3 MG/DL (ref 0.1–1)
BUN SERPL-MCNC: 11 MG/DL (ref 8–23)
CALCIUM SERPL-MCNC: 9.1 MG/DL (ref 8.7–10.5)
CHLORIDE SERPL-SCNC: 104 MMOL/L (ref 95–110)
CO2 SERPL-SCNC: 27 MMOL/L (ref 23–29)
CREAT SERPL-MCNC: 0.7 MG/DL (ref 0.5–1.4)
DIFFERENTIAL METHOD BLD: ABNORMAL
EOSINOPHIL # BLD AUTO: 0.9 K/UL (ref 0–0.5)
EOSINOPHIL NFR BLD: 9.3 % (ref 0–8)
ERYTHROCYTE [DISTWIDTH] IN BLOOD BY AUTOMATED COUNT: 13.3 % (ref 11.5–14.5)
ERYTHROCYTE [DISTWIDTH] IN BLOOD BY AUTOMATED COUNT: 13.3 % (ref 11.5–14.5)
EST. GFR  (NO RACE VARIABLE): >60 ML/MIN/1.73 M^2
GLUCOSE SERPL-MCNC: 88 MG/DL (ref 70–110)
HCT VFR BLD AUTO: 33.4 % (ref 37–48.5)
HCT VFR BLD AUTO: 33.4 % (ref 37–48.5)
HGB BLD-MCNC: 11.1 G/DL (ref 12–16)
HGB BLD-MCNC: 11.1 G/DL (ref 12–16)
IMM GRANULOCYTES # BLD AUTO: 0.03 K/UL (ref 0–0.04)
IMM GRANULOCYTES NFR BLD AUTO: 0.3 % (ref 0–0.5)
LYMPHOCYTES # BLD AUTO: 3.3 K/UL (ref 1–4.8)
LYMPHOCYTES NFR BLD: 35.3 % (ref 18–48)
MAGNESIUM SERPL-MCNC: 1.9 MG/DL (ref 1.6–2.6)
MCH RBC QN AUTO: 29.5 PG (ref 27–31)
MCH RBC QN AUTO: 29.5 PG (ref 27–31)
MCHC RBC AUTO-ENTMCNC: 33.2 G/DL (ref 32–36)
MCHC RBC AUTO-ENTMCNC: 33.2 G/DL (ref 32–36)
MCV RBC AUTO: 89 FL (ref 82–98)
MCV RBC AUTO: 89 FL (ref 82–98)
MONOCYTES # BLD AUTO: 0.8 K/UL (ref 0.3–1)
MONOCYTES NFR BLD: 8.9 % (ref 4–15)
NEUTROPHILS # BLD AUTO: 4.3 K/UL (ref 1.8–7.7)
NEUTROPHILS NFR BLD: 45.7 % (ref 38–73)
NRBC BLD-RTO: 0 /100 WBC
PLATELET # BLD AUTO: 284 K/UL (ref 150–450)
PLATELET # BLD AUTO: 284 K/UL (ref 150–450)
PMV BLD AUTO: 9.4 FL (ref 9.2–12.9)
PMV BLD AUTO: 9.4 FL (ref 9.2–12.9)
POTASSIUM SERPL-SCNC: 3.8 MMOL/L (ref 3.5–5.1)
PROT SERPL-MCNC: 6 G/DL (ref 6–8.4)
RBC # BLD AUTO: 3.76 M/UL (ref 4–5.4)
RBC # BLD AUTO: 3.76 M/UL (ref 4–5.4)
SODIUM SERPL-SCNC: 138 MMOL/L (ref 136–145)
WBC # BLD AUTO: 9.39 K/UL (ref 3.9–12.7)
WBC # BLD AUTO: 9.39 K/UL (ref 3.9–12.7)

## 2024-03-24 PROCEDURE — 85025 COMPLETE CBC W/AUTO DIFF WBC: CPT | Performed by: INTERNAL MEDICINE

## 2024-03-24 PROCEDURE — 36415 COLL VENOUS BLD VENIPUNCTURE: CPT | Performed by: INTERNAL MEDICINE

## 2024-03-24 PROCEDURE — 80053 COMPREHEN METABOLIC PANEL: CPT | Performed by: INTERNAL MEDICINE

## 2024-03-24 PROCEDURE — 25000003 PHARM REV CODE 250: Performed by: INTERNAL MEDICINE

## 2024-03-24 PROCEDURE — 63600175 PHARM REV CODE 636 W HCPCS: Mod: JZ,JG | Performed by: INTERNAL MEDICINE

## 2024-03-24 PROCEDURE — 83735 ASSAY OF MAGNESIUM: CPT | Performed by: INTERNAL MEDICINE

## 2024-03-24 RX ORDER — CIPROFLOXACIN 500 MG/1
500 TABLET ORAL 2 TIMES DAILY
Qty: 8 TABLET | Refills: 0 | Status: SHIPPED | OUTPATIENT
Start: 2024-03-24 | End: 2024-03-28

## 2024-03-24 RX ORDER — METRONIDAZOLE 500 MG/1
500 TABLET ORAL EVERY 8 HOURS
Qty: 12 TABLET | Refills: 0 | Status: SHIPPED | OUTPATIENT
Start: 2024-03-24 | End: 2024-03-28

## 2024-03-24 RX ADMIN — METRONIDAZOLE 500 MG: 500 INJECTION, SOLUTION INTRAVENOUS at 01:03

## 2024-03-24 RX ADMIN — METRONIDAZOLE 500 MG: 500 INJECTION, SOLUTION INTRAVENOUS at 09:03

## 2024-03-24 RX ADMIN — ASPIRIN 81 MG: 81 TABLET, COATED ORAL at 09:03

## 2024-03-24 RX ADMIN — CEFTRIAXONE SODIUM 1 G: 1 INJECTION, POWDER, FOR SOLUTION INTRAMUSCULAR; INTRAVENOUS at 09:03

## 2024-03-24 RX ADMIN — METOPROLOL SUCCINATE 25 MG: 25 TABLET, EXTENDED RELEASE ORAL at 09:03

## 2024-03-24 RX ADMIN — LEVOTHYROXINE SODIUM 88 MCG: 88 TABLET ORAL at 05:03

## 2024-03-24 RX ADMIN — FAMOTIDINE 20 MG: 10 INJECTION INTRAVENOUS at 09:03

## 2024-03-24 NOTE — PROGRESS NOTES
"Patient Name: Selene Posey  Patient MRN: [id number]  Patient : 1935    Admit Date: 3/22/2024  Service date: 3/24/2024    Reason for Consult:  Bleeding rectally    PCP: Alisha Lopez MD    S:  Patient much better today.  Able to eat.  No significant pain.  No blood in his stool.  Symptoms seem to be resolved.     Inpatient Medications:   aspirin  81 mg Oral Daily    cefTRIAXone (Rocephin) IV (PEDS and ADULTS)  1 g Intravenous Q24H    famotidine (PF)  20 mg Intravenous Daily    levothyroxine  88 mcg Oral Before breakfast    metoprolol succinate  25 mg Oral Daily    metronidazole  500 mg Intravenous Q8H     acetaminophen, acetaminophen, aluminum-magnesium hydroxide-simethicone, clonazePAM, dextrose 50%, dextrose 50%, glucagon (human recombinant), glucose, glucose, HYDROmorphone, magnesium oxide, magnesium oxide, melatonin, naloxone, ondansetron, potassium bicarbonate, potassium bicarbonate, potassium bicarbonate, potassium, sodium phosphates, potassium, sodium phosphates, potassium, sodium phosphates, promethazine (PHENERGAN) 6.25 mg in dextrose 5 % (D5W) 50 mL IVPB, sodium chloride 0.9%    Review of Systems:  A 10 point review of systems was performed and was normal, except as mentioned in the HPI, including constitutional, HEENT, heme, lymph, cardiovascular, respiratory, gastrointestinal, genitourinary, neurologic, endocrine, psychiatric and musculoskeletal.      OBJECTIVE:    Physical Exam:  24 Hour Vital Sign Ranges: Temp:  [97.8 °F (36.6 °C)-98.1 °F (36.7 °C)] 98.1 °F (36.7 °C)  Pulse:  [60-73] 60  Resp:  [15-18] 17  SpO2:  [94 %-98 %] 96 %  BP: (122-155)/(59-69) 143/69  Most recent vitals: BP (!) 143/69   Pulse 60   Temp 98.1 °F (36.7 °C)   Resp 17   Ht 5' 4.5" (1.638 m)   Wt 65.7 kg (144 lb 14.4 oz)   SpO2 96%   BMI 24.49 kg/m²    GEN: well-developed, well-nourished, awake and alert, non-toxic appearing adult  HEENT: PERRL, sclera anicteric, oral mucosa pink and moist without " "lesion  NECK: trachea midline; Good ROM  CV: regular rate and rhythm, no murmurs or gallops  RESP: clear to auscultation bilaterally, no wheezes, rhonci or rales  ABD: soft, non-tender, non-distended, normal bowel sounds  EXT: no swelling or edema, 2+ pulses distally  SKIN: no rashes or jaundice  PSYCH: normal affect    Labs:   Recent Labs     03/23/24  0613 03/23/24  1927 03/24/24  0721   WBC 9.23 10.84 9.39  9.39   MCV 89 92 89  89    276 284  284     Recent Labs     03/22/24  0221 03/22/24  0801 03/23/24  0613   * 138 141   K 4.2 4.0 3.6    106 107   CO2 24 26 27   BUN 18 14 7*   * 96 89     No results for input(s): "ALB" in the last 72 hours.    Invalid input(s): "ALKP", "SGOT", "SGPT", "TBIL", "DBIL", "TPRO"  Recent Labs     03/22/24  0221   INR 0.9         Radiology Review:  CT Abdomen Pelvis With IV Contrast NO Oral Contrast   Final Result            IMPRESSION / RECOMMENDATIONS:  Ischemic colitis resolved.  Patient eating food today.  No blood in his stool.  Abdomen is soft no significant discomfort.  Wants to go home.  Does not want a colonoscopy which is understandable.      Javier Foster  3/24/2024  8:27 AM       "

## 2024-03-24 NOTE — CONSULTS
GASTROENTEROLOGY INPATIENT CONSULT NOTE  Patient Name: Selene Posey  Patient MRN: [id number]  Patient : 1935    Admit Date: 3/22/2024  Service date: 3/23/2024    Reason for Consult:  Lower GI bleeding with cramps who went out to eat Chinese food on Thursday.  That evening began having severe cramps this was followed by bright red blood.  She had diaphoresis with this as well and an episode of chills.  Her symptoms have markedly better now her bleeding has stopped.  She has never had this before.  Her last colonoscopy was in the distant past.    PCP: Alisha Lopez MD    Chief Complaint   Patient presents with    Rectal Bleeding     Started having diarrhea about  last night with abdominal cramping - got up to go to the bathroom multiple times and on the last time she had bright red blood in her stool - nausea but no vomiting       HPI: Patient is a 88 y.o. female with PMHx  with acute onset of abdominal pain after eating Chinese food.  Stools have been negative for C difficile and neutrophils.  The pain came on acutely followed by significant cramps diaphoresis and then blood in his stool.  The blood was red with mucus.  CT scan revealed colitis of the descending and sigmoid colon.  Doing markedly better today.  Her last bowel movement was brown.  Witnessed by me..     Past Medical History:  Past Medical History:   Diagnosis Date    Bronchiectasis     CKD (chronic kidney disease)     COVID-19 virus infection 2022    GERD (gastroesophageal reflux disease)     High cholesterol     Hypertension     Paroxysmal atrial fibrillation     Thyroid disease         Past Surgical History:  Past Surgical History:   Procedure Laterality Date    APPENDECTOMY      BREAST SURGERY      EYE SURGERY  2022    HYSTERECTOMY      ovaries retained    JOINT REPLACEMENT      knee    TONSILLECTOMY          Home Medications:  Medications Prior to Admission   Medication Sig Dispense Refill Last Dose     ascorbic acid, vitamin C, (VITAMIN C) 500 MG tablet Take 500 mg by mouth once daily.   3/21/2024    aspirin (ECOTRIN) 81 MG EC tablet Take 1 tablet by mouth every morning.   3/21/2024    clonazePAM (KLONOPIN) 0.5 MG tablet Take 1 tablet by mouth 2 (two) times daily as needed for Anxiety.   3/21/2024    fexofenadine (ALLEGRA) 180 MG tablet Take 180 mg by mouth once daily.   3/21/2024    fluocinolone acetonide oiL 0.01 % Drop Place 5 drops in ear(s) 2 (two) times daily as needed.   Past Month    fluticasone propionate (FLONASE) 50 mcg/actuation nasal spray 1 spray (50 mcg total) by Each Nostril route once daily. 32 g 1 3/21/2024    levothyroxine (SYNTHROID) 88 MCG tablet Take 88 mcg by mouth before breakfast.   3/21/2024    lutein 20 mg Cap Take 1 capsule by mouth once daily.   3/21/2024    metoprolol succinate (TOPROL-XL) 25 MG 24 hr tablet Take 25 mg by mouth once daily.   3/21/2024    omeprazole (PRILOSEC) 40 MG capsule Take 40 mg by mouth before breakfast.   3/21/2024    sodium chloride (OCEAN) 0.65 % nasal spray 1 spray by Nasal route daily as needed for Congestion.   3/21/2024    acyclovir (ZOVIRAX) 400 MG tablet Take 400 mg by mouth 2 (two) times daily as needed.   Unknown       Inpatient Medications:   aspirin  81 mg Oral Daily    cefTRIAXone (Rocephin) IV (PEDS and ADULTS)  1 g Intravenous Q24H    famotidine (PF)  20 mg Intravenous Daily    levothyroxine  88 mcg Oral Before breakfast    metoprolol succinate  25 mg Oral Daily    metronidazole  500 mg Intravenous Q8H     acetaminophen, acetaminophen, aluminum-magnesium hydroxide-simethicone, clonazePAM, dextrose 50%, dextrose 50%, glucagon (human recombinant), glucose, glucose, HYDROmorphone, magnesium oxide, magnesium oxide, melatonin, naloxone, ondansetron, potassium bicarbonate, potassium bicarbonate, potassium bicarbonate, potassium, sodium phosphates, potassium, sodium phosphates, potassium, sodium phosphates, promethazine (PHENERGAN) 6.25 mg in dextrose 5  "% (D5W) 50 mL IVPB, sodium chloride 0.9%    Review of patient's allergies indicates:   Allergen Reactions    Azithromycin     Codeine     Erythropoietin analogues     Pcn [penicillins]     Sulfa (sulfonamide antibiotics)     Zetia [ezetimibe]     Atorvastatin      Other reaction(s): Joint pain    Fenofibrate      Made patient hoarse.       Social History:   Social History     Occupational History    Not on file   Tobacco Use    Smoking status: Never    Smokeless tobacco: Never   Substance and Sexual Activity    Alcohol use: No    Drug use: Never    Sexual activity: Not Currently     Partners: Male       Family History:   Family History   Problem Relation Age of Onset    Breast cancer Mother 65    Cancer Mother        Review of Systems:  A 10 point review of systems was performed and was normal, except as mentioned in the HPI, including constitutional, HEENT, heme, lymph, cardiovascular, respiratory, gastrointestinal, genitourinary, neurologic, endocrine, psychiatric and musculoskeletal.      OBJECTIVE:    Physical Exam:  24 Hour Vital Sign Ranges: Temp:  [97.6 °F (36.4 °C)-98.2 °F (36.8 °C)] 97.9 °F (36.6 °C)  Pulse:  [63-73] 70  Resp:  [17-18] 18  SpO2:  [94 %-99 %] 97 %  BP: (119-155)/(59-68) 140/66  Most recent vitals: BP (!) 140/66   Pulse 70   Temp 97.9 °F (36.6 °C) (Oral)   Resp 18   Ht 5' 4.5" (1.638 m)   Wt 65.7 kg (144 lb 14.4 oz)   SpO2 97%   BMI 24.49 kg/m²    GEN: well-developed, well-nourished, awake and alert, non-toxic appearing adult  HEENT: PERRL, sclera anicteric, oral mucosa pink and moist without lesion  NECK: trachea midline; Good ROM  CV: regular rate and rhythm, no murmurs or gallops  RESP: clear to auscultation bilaterally, no wheezes, rhonci or rales  ABD: soft, non-tender, non-distended, normal bowel sounds  EXT: no swelling or edema, 2+ pulses distally  SKIN: no rashes or jaundice  PSYCH: normal affect    Labs:   Recent Labs     03/22/24  0221 03/23/24  0613   WBC 13.87* 9.23   MCV " "90 89    290     Recent Labs     03/22/24  0221 03/22/24  0801 03/23/24  0613   * 138 141   K 4.2 4.0 3.6    106 107   CO2 24 26 27   BUN 18 14 7*   * 96 89     No results for input(s): "ALB" in the last 72 hours.    Invalid input(s): "ALKP", "SGOT", "SGPT", "TBIL", "DBIL", "TPRO"  Recent Labs     03/22/24  0221   INR 0.9         Radiology Review:  CT Abdomen Pelvis With IV Contrast NO Oral Contrast   Final Result            IMPRESSION / RECOMMENDATIONS:  Classic symptoms and finding of ischemic colitis.  With acute onset of abdominal pain followed by bleeding and inflammation or CT scan consistent with the watershed area of the colon of the splenic flexure and descending colon.  Patient markedly improved.  Stools were negative for neutrophils and C  difficile.  Patient needs fluids clear liquid diet advance as tolerates.  Seems to be recovering readily.  May be able to go home tomorrow.    Thank you for this consult.    Javier Foster  3/23/2024  7:06 PM       "

## 2024-03-24 NOTE — PLAN OF CARE
Patient cleared for discharge from case management standpoint.    Follow up appointments scheduled and added to AVS.    Chart and discharge orders reviewed.  Patient discharged home with no further case management needs.                     03/24/24 1033   Final Note   Assessment Type Final Discharge Note   Anticipated Discharge Disposition Home   What phone number can be called within the next 1-3 days to see how you are doing after discharge? [phone]   Post-Acute Status   Discharge Delays None known at this time

## 2024-03-24 NOTE — DISCHARGE SUMMARY
"[facility]  Discharge Summary      Patient Name: Selene Posey  MRN: [id number]  ERIC: [id number]  Patient Class: IP- Inpatient  Admission Date: 3/22/2024  Hospital Length of Stay: 1 days  Discharge Date and Time:  03/24/2024 1:35 PM  Attending Physician: No att. providers found   Discharging Provider: Victor M Melendez MD  Primary Care Provider: Alisha Lopez MD    Primary Care Team: Networked reference to record PCT     HPI:   88 WF with pmh of anxiety and not much else.   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    And then diarrhea that had bright red blood in it.   Her pain I abdomen was like there was " glass inside of me " she said.  Sharp pains.     No fever no chills  No sick contacts    But she did go to chinese restaurant earlier that day for lunch.      Then she said actually for about 3 days she noticed some nausea and mild abdominal discomfort that was new for her.       The diarrhea continued though last night and was consistently bloody she said so she came to our ER    In the ER they gave her IVF bolus   And flagyl and Levaquin IV    Her labs were pretty normal   Vitals stable      We are admitting her for this colitis now     * No surgery found *      Hospital Course:   Patient was admitted for bloody Bms, Patient was continued with Abx and IVF. Patient's CT in the ED had some concerns for Ischemic Colitis, however findings were more in line with Infectious Colitis. Patient began to feel better, however her bloody stools continued.     Stool studies negative.  GI was consulted who evaluated the patient and said patient's symptoms were likely due to ischemic colitis which has now improved. GI said patient did not want colonoscopy.  Hemoglobin remained stable.  Patient received IV ceftriaxone and " metronidazole during hospitalization, advised p.o. ciprofloxacin and metronidazole x4 days to complete antibiotic course for total of 7 days.     Goals of Care Treatment Preferences:  Code Status: Full Code      Consults:   Consults (From admission, onward)          Status Ordering Provider     Inpatient consult to Gastroenterology  Once        Provider:  Javier Foster MD    Completed SUJATA MIN            GI  * Hemorrhagic colitis  Patient reported she still continues to have blood in stool,  H&H stable  advance diet as tolerated   Stool cultures pending  On IV ceftriaxone and metronidazole for colitis  Will consult GI for evaluation and recommendations    Stool studies negative.  GI was consulted who evaluated the patient and said patient's symptoms were likely due to ischemic colitis which has now improved. GI said patient did not want colonoscopy.  Hemoglobin remained stable.  Patient received IV ceftriaxone and metronidazole during hospitalization, advised p.o. ciprofloxacin and metronidazole x4 days to complete antibiotic course for total of 7 days.            Final Active Diagnoses:    Diagnosis Date Noted POA    PRINCIPAL PROBLEM:  Hemorrhagic colitis [K52.9] 03/22/2024 Yes    Essential hypertension [I10] 05/01/2023 Yes    Acquired hypothyroidism [E03.9] 05/16/2022 Yes      Problems Resolved During this Admission:       Discharged Condition: good    Disposition: Home or Self Care    Follow Up:    Patient Instructions:      Ambulatory referral/consult to Gastroenterology   Standing Status: Future   Referral Priority: Routine Referral Type: Consultation   Referral Reason: Specialty Services Required   Requested Specialty: Gastroenterology   Number of Visits Requested: 1     Diet Adult Regular     Activity as tolerated       Significant Diagnostic Studies: Labs: All labs within the past 24 hours have been reviewed      Pending Diagnostic Studies:       Procedure Component Value Units Date/Time     Gastrointestinal Pathogens Panel, PCR [[id number]] Collected: 03/23/24 1115    Order Status: Sent Lab Status: In process Updated: 03/23/24 1136    Specimen: Stool            Medications:  Reconciled Home Medications:      Medication List        START taking these medications      ciprofloxacin HCl 500 MG tablet  Commonly known as: CIPRO  Take 1 tablet (500 mg total) by mouth 2 (two) times daily. for 4 days     metroNIDAZOLE 500 MG tablet  Commonly known as: FLAGYL  Take 1 tablet (500 mg total) by mouth every 8 (eight) hours. for 4 days            CONTINUE taking these medications      acyclovir 400 MG tablet  Commonly known as: ZOVIRAX  Take 400 mg by mouth 2 (two) times daily as needed.     ascorbic acid (vitamin C) 500 MG tablet  Commonly known as: VITAMIN C  Take 500 mg by mouth once daily.     aspirin 81 MG EC tablet  Commonly known as: ECOTRIN  Take 1 tablet by mouth every morning.     clonazePAM 0.5 MG tablet  Commonly known as: KlonoPIN  Take 1 tablet by mouth 2 (two) times daily as needed for Anxiety.     fexofenadine 180 MG tablet  Commonly known as: ALLEGRA  Take 180 mg by mouth once daily.     fluocinolone acetonide oiL 0.01 % Drop  Place 5 drops in ear(s) 2 (two) times daily as needed.     fluticasone propionate 50 mcg/actuation nasal spray  Commonly known as: FLONASE  1 spray (50 mcg total) by Each Nostril route once daily.     levothyroxine 88 MCG tablet  Commonly known as: SYNTHROID  Take 88 mcg by mouth before breakfast.     lutein 20 mg Cap  Take 1 capsule by mouth once daily.     metoprolol succinate 25 MG 24 hr tablet  Commonly known as: TOPROL-XL  Take 25 mg by mouth once daily.     omeprazole 40 MG capsule  Commonly known as: PRILOSEC  Take 40 mg by mouth before breakfast.     sodium chloride 0.65 % nasal spray  Commonly known as: OCEAN  1 spray by Nasal route daily as needed for Congestion.              Indwelling Lines/Drains at time of discharge:   Lines/Drains/Airways       None                    Time spent on the discharge of patient: 33 minutes         Victor M Melendez MD  Department of Hospital Medicine  [facility]

## 2024-03-24 NOTE — ASSESSMENT & PLAN NOTE
Patient reported she still continues to have blood in stool,  H&H stable  advance diet as tolerated   Stool cultures pending  On IV ceftriaxone and metronidazole for colitis  Will consult GI for evaluation and recommendations    Stool studies negative.  GI was consulted who evaluated the patient and said patient's symptoms were likely due to ischemic colitis which has now improved. GI said patient did not want colonoscopy.  Hemoglobin remained stable.  Patient received IV ceftriaxone and metronidazole during hospitalization, advised p.o. ciprofloxacin and metronidazole x4 days to complete antibiotic course for total of 7 days.

## 2024-03-25 LAB
BACTERIA STL CULT: NORMAL
BACTERIA STL CULT: NORMAL

## 2024-03-27 LAB
ADV 40+41 DNA STL QL NAA+NON-PROBE: NOT DETECTED
ASTRO TYP 1-8 RNA STL QL NAA+NON-PROBE: NOT DETECTED
C CAYETANENSIS DNA STL QL NAA+NON-PROBE: NOT DETECTED
C COLI+JEJ+UPSA DNA STL QL NAA+NON-PROBE: NOT DETECTED
C DIF TOX TCDA+TCDB STL QL NAA+NON-PROBE: DETECTED
CRYPTOSP DNA STL QL NAA+NON-PROBE: NOT DETECTED
E COLI O157 DNA STL QL NAA+NON-PROBE: ABNORMAL
E HISTOLYT DNA STL QL NAA+NON-PROBE: NOT DETECTED
EC STX1+STX2 GENES STL QL NAA+NON-PROBE: NOT DETECTED
ENTEROAGGREGATIVE E COLI: NOT DETECTED
ENTEROPATHOGENIC E COLI: NOT DETECTED
ETEC LTA+ST1A+ST1B TOX ST NAA+NON-PROBE: NOT DETECTED
G LAMBLIA DNA STL QL NAA+NON-PROBE: NOT DETECTED
GPP - SALMONELLA: NOT DETECTED
GPP - VIBRIO CHOLERA: NOT DETECTED
GPP - YERSINIA ENTEROCOLITICA: NOT DETECTED
NOROVIRUS GI+II RNA STL QL NAA+NON-PROBE: NOT DETECTED
PLESIOMONAS SHIGELLOIDES: NOT DETECTED
RVA RNA STL QL NAA+NON-PROBE: NOT DETECTED
SAPO I+II+IV+V RNA STL QL NAA+NON-PROBE: NOT DETECTED
SHIGELLA SP+EIEC IPAH ST NAA+NON-PROBE: NOT DETECTED
VIBRIO: NOT DETECTED

## 2024-03-28 LAB
BACTERIA BLD CULT: NORMAL
BACTERIA BLD CULT: NORMAL

## 2024-03-31 NOTE — HOSPITAL COURSE
Patient was admitted for bloody Bms, Patient was continued with Abx and IVF. Patient's CT in the ED had some concerns for Ischemic Colitis, however findings were more in line with Infectious Colitis. Patient began to feel better, however her bloody stools continued.     Stool studies negative.  GI was consulted who evaluated the patient and said patient's symptoms were likely due to ischemic colitis which has now improved. GI said patient did not want colonoscopy.  Hemoglobin remained stable.  Patient received IV ceftriaxone and metronidazole during hospitalization, advised p.o. ciprofloxacin and metronidazole x4 days to complete antibiotic course for total of 7 days.  
- - -

## 2024-04-11 LAB
OHS QRS DURATION: 84 MS
OHS QTC CALCULATION: 400 MS

## 2024-04-15 ENCOUNTER — OFFICE VISIT (OUTPATIENT)
Dept: PULMONOLOGY | Facility: CLINIC | Age: 89
End: 2024-04-15
Payer: MEDICARE

## 2024-04-15 VITALS
WEIGHT: 143 LBS | SYSTOLIC BLOOD PRESSURE: 120 MMHG | HEART RATE: 59 BPM | OXYGEN SATURATION: 96 % | BODY MASS INDEX: 24.17 KG/M2 | DIASTOLIC BLOOD PRESSURE: 64 MMHG

## 2024-04-15 DIAGNOSIS — J47.9 BRONCHIECTASIS WITHOUT COMPLICATION: Primary | ICD-10-CM

## 2024-04-15 PROCEDURE — 99212 OFFICE O/P EST SF 10 MIN: CPT | Mod: S$GLB,,, | Performed by: NURSE PRACTITIONER

## 2024-04-15 NOTE — PROGRESS NOTES
SUBJECTIVE:    Patient ID: Selene Posey is a 88 y.o. female.    Chief Complaint: Follow-up (6 month follow up)    Patient here today feeling alright. She states she was hospitalized recently with colitis and is still not back to her baseline as far as energy goes. She feels her breathing is well. She does expectorate mucous at time. CT of abdomen done recently shows bibasilar bronchiectasis and subpleural banding.        Past Medical History:   Diagnosis Date    Bronchiectasis     CKD (chronic kidney disease)     COVID-19 virus infection 5/16/2022    GERD (gastroesophageal reflux disease)     High cholesterol     Hypertension     Paroxysmal atrial fibrillation     Thyroid disease      Past Surgical History:   Procedure Laterality Date    APPENDECTOMY      BREAST SURGERY  1975    EYE SURGERY  August 2022    HYSTERECTOMY      ovaries retained    JOINT REPLACEMENT  2020    knee    TONSILLECTOMY  1944     Family History   Problem Relation Name Age of Onset    Breast cancer Mother Sowmya Piña 65    Cancer Mother Sowmya Piña         Social History:   Marital Status:   Occupation:   Alcohol History:  reports no history of alcohol use.  Tobacco History:  reports that she has never smoked. She has never used smokeless tobacco.  Drug History:  reports no history of drug use.    Review of patient's allergies indicates:   Allergen Reactions    Azithromycin     Codeine     Erythropoietin analogues     Pcn [penicillins]     Sulfa (sulfonamide antibiotics)     Zetia [ezetimibe]     Atorvastatin      Other reaction(s): Joint pain    Fenofibrate      Made patient hoarse.       Current Outpatient Medications   Medication Sig Dispense Refill    acyclovir (ZOVIRAX) 400 MG tablet Take 400 mg by mouth 2 (two) times daily as needed.      ascorbic acid, vitamin C, (VITAMIN C) 500 MG tablet Take 500 mg by mouth once daily.      aspirin (ECOTRIN) 81 MG EC tablet Take 1 tablet by mouth every  morning.      clonazePAM (KLONOPIN) 0.5 MG tablet Take 1 tablet by mouth 2 (two) times daily as needed for Anxiety.      fexofenadine (ALLEGRA) 180 MG tablet Take 180 mg by mouth once daily.      fluocinolone acetonide oiL 0.01 % Drop Place 5 drops in ear(s) 2 (two) times daily as needed.      fluticasone propionate (FLONASE) 50 mcg/actuation nasal spray 1 spray (50 mcg total) by Each Nostril route once daily. 32 g 1    levothyroxine (SYNTHROID) 88 MCG tablet Take 88 mcg by mouth before breakfast.      lutein 20 mg Cap Take 1 capsule by mouth once daily.      metoprolol succinate (TOPROL-XL) 25 MG 24 hr tablet Take 25 mg by mouth once daily.      omeprazole (PRILOSEC) 40 MG capsule Take 40 mg by mouth before breakfast.      sodium chloride (OCEAN) 0.65 % nasal spray 1 spray by Nasal route daily as needed for Congestion.       No current facility-administered medications for this visit.     PFT 2021 no obstruction, mild restriction, moderate diffusion defect.     Chest xray 09/2022  IMPRESSION:  No acute pulmonary process     Mild increase in interstitial markings suggestive of chronic changes      Last ct of chest 03/2021  IMPRESSION:     1. Mild bilateral lower lobe bronchiectasis.  2. 5 mm left midlung nodule unchanged since 2016 and presumably  benign.  3. Coronary artery calcifications.     General: Feeling Well.    Eyes: Vision is good.  ENT:  no complaints   Heart::  palpitations.  Lungs: expectorates mucous occasionally   GI: GERD, dysphagia at times  : No dysuria, hesitancy, or nocturia.  Musculoskeletal: chronic neck discomfort   Skin: No lesions or rashes.  Neuro: occasional headaches   Lymph: legs swell during the day  Psych: anxiety better  Endo: weight stable     OBJECTIVE:      /64 (BP Location: Left arm, Patient Position: Sitting, BP Method: Medium (Manual))   Pulse (!) 59   Wt 64.9 kg (143 lb)   SpO2 96%   BMI 24.17 kg/m²     Physical Exam  GENERAL: Older patient in no distress.  HEENT:  "Pupils equal and reactive. Extraocular movements intact. Nose intact.      Pharynx moist.   NECK: Supple.   HEART: Regular rate and rhythm. No murmur or gallop auscultated.  LUNGS: clear  ABDOMEN: Bowel sounds present. Non-tender, no masses palpated.  EXTREMITIES: Normal muscle tone and joint movement, no cyanosis or clubbing.   LYMPHATICS: No adenopathy palpated, trace edema to legs   SKIN: Dry, intact, no lesions.   NEURO: Cranial nerves II-XII intact. Motor strength 5/5 bilaterally, upper and lower extremities.  PSYCH: Appropriate affect.    Assessment:       1. Bronchiectasis without complication                  Plan:       Selene "Janelle" was seen today for follow-up.    Diagnoses and all orders for this visit:    Bronchiectasis without complication               Fine to take mucinex twice a day if have difficulty expectorating mucous     Follow up in about 1 year (around 4/15/2025).                        "

## 2024-05-20 NOTE — TELEPHONE ENCOUNTER
No care due was identified.  [facility] Embedded Care Due Messages. Reference number: [id number].   5/20/2024 1:55:57 PM CDT

## 2024-05-20 NOTE — TELEPHONE ENCOUNTER
----- Message from Clint Marte sent at 5/20/2024  1:51 PM CDT -----  Regarding: refill  Contact: patient  Type:  RX Refill Request    Who Called: patient  Refill or New Rx:refill  RX Name and Strength:levothyroxine (SYNTHROID) 88 MCG tablet  How is the patient currently taking it? (ex. 1XDay):  Is this a 30 day or 90 day RX:90  Preferred Pharmacy with phone number:  [facility] #[id number] - [place] [address] [address]  [address] [address]  Phone: [phone] Fax: [phone]  Local or Mail Order:local  Ordering Provider:John  Would the patient rather a call back or a response via [facility]? refill  Best Call Back Number:[phone]  Additional Information:

## 2024-05-21 RX ORDER — LEVOTHYROXINE SODIUM 88 UG/1
88 TABLET ORAL
Qty: 90 TABLET | Refills: 3 | Status: SHIPPED | OUTPATIENT
Start: 2024-05-21

## 2024-05-21 NOTE — TELEPHONE ENCOUNTER
Refill Routing Note   Medication(s) are not appropriate for processing by [facility] for the following reason(s):        No active prescription written by provider    [facility] action(s):  Defer               Appointments  past 12m or future 3m with PCP    Date Provider   Last Visit   3/12/2024 Alisha Lopez MD   Next Visit   6/17/2024 Alisha Lopez MD   ED visits in past 90 days: 0        Note composed:10:41 AM 05/21/2024

## 2024-06-24 PROBLEM — K52.9 HEMORRHAGIC COLITIS: Status: RESOLVED | Noted: 2024-03-22 | Resolved: 2024-06-24

## 2024-07-27 ENCOUNTER — OFFICE VISIT (OUTPATIENT)
Dept: URGENT CARE | Facility: CLINIC | Age: 89
End: 2024-07-27
Payer: MEDICARE

## 2024-07-27 VITALS
RESPIRATION RATE: 16 BRPM | TEMPERATURE: 98 F | BODY MASS INDEX: 24.41 KG/M2 | SYSTOLIC BLOOD PRESSURE: 126 MMHG | HEART RATE: 68 BPM | DIASTOLIC BLOOD PRESSURE: 76 MMHG | OXYGEN SATURATION: 97 % | HEIGHT: 64 IN | WEIGHT: 143 LBS

## 2024-07-27 DIAGNOSIS — H66.91 RIGHT OTITIS MEDIA, UNSPECIFIED OTITIS MEDIA TYPE: Primary | ICD-10-CM

## 2024-07-27 DIAGNOSIS — R42 VERTIGO: ICD-10-CM

## 2024-07-27 DIAGNOSIS — R42 DIZZINESS: ICD-10-CM

## 2024-07-27 LAB
CTP QC/QA: YES
GLUCOSE SERPL-MCNC: 101 MG/DL (ref 70–110)
SARS-COV-2 AG RESP QL IA.RAPID: NEGATIVE

## 2024-07-27 PROCEDURE — 82962 GLUCOSE BLOOD TEST: CPT | Mod: ,,,

## 2024-07-27 PROCEDURE — 99214 OFFICE O/P EST MOD 30 MIN: CPT | Mod: S$GLB,,,

## 2024-07-27 PROCEDURE — 93005 ELECTROCARDIOGRAM TRACING: CPT | Mod: S$GLB,,,

## 2024-07-27 PROCEDURE — 93010 ELECTROCARDIOGRAM REPORT: CPT | Mod: S$PBB,,, | Performed by: INTERNAL MEDICINE

## 2024-07-27 PROCEDURE — 87811 SARS-COV-2 COVID19 W/OPTIC: CPT | Mod: QW,S$GLB,,

## 2024-07-27 RX ORDER — PREDNISONE 10 MG/1
10 TABLET ORAL DAILY
Qty: 5 TABLET | Refills: 0 | Status: SHIPPED | OUTPATIENT
Start: 2024-07-27 | End: 2024-08-01

## 2024-07-27 RX ORDER — PREDNISONE 10 MG/1
10 TABLET ORAL DAILY
Qty: 5 TABLET | Refills: 0 | Status: SHIPPED | OUTPATIENT
Start: 2024-07-27 | End: 2024-07-27

## 2024-07-27 RX ORDER — MECLIZINE HCL 12.5 MG 12.5 MG/1
12.5 TABLET ORAL 3 TIMES DAILY PRN
Qty: 15 TABLET | Refills: 0 | Status: SHIPPED | OUTPATIENT
Start: 2024-07-27 | End: 2024-07-27

## 2024-07-27 RX ORDER — CEFDINIR 300 MG/1
300 CAPSULE ORAL 2 TIMES DAILY
Qty: 20 CAPSULE | Refills: 0 | Status: SHIPPED | OUTPATIENT
Start: 2024-07-27 | End: 2024-07-27

## 2024-07-27 RX ORDER — MECLIZINE HCL 12.5 MG 12.5 MG/1
12.5 TABLET ORAL 3 TIMES DAILY PRN
Qty: 15 TABLET | Refills: 0 | Status: SHIPPED | OUTPATIENT
Start: 2024-07-27

## 2024-07-27 RX ORDER — CEFDINIR 300 MG/1
300 CAPSULE ORAL 2 TIMES DAILY
Qty: 20 CAPSULE | Refills: 0 | Status: SHIPPED | OUTPATIENT
Start: 2024-07-27 | End: 2024-08-06

## 2024-07-27 NOTE — PROGRESS NOTES
"Subjective:      Patient ID: Selene Posey is a 89 y.o. female.    Vitals:  height is 5' 4" (1.626 m) and weight is 64.9 kg (143 lb). Her oral temperature is 97.7 °F (36.5 °C). Her blood pressure is 126/76 and her pulse is 68. Her respiration is 16 and oxygen saturation is 97%.     Chief Complaint: Dizziness    Pt states she been having right ear pain with dizziness for the past several weeks. Pt states that she saw her ENT and was given ear drops but states that she only used them one day. Pt states that she is continuing to have ear pain. Pt states that the dizziness comes and goes. Pt states that the dizziness does get worse with position change. Pt states that she does have an appointment with her pcp on Monday to follow up regarding this. Pt states that she is concerned about her b 12 levels being low and requesting a b 12 injection. Advised pt to follow up with pcp regarding this    Dizziness:   Chronicity:  New  Onset:  Today  Progression since onset:  Gradually worsening  Frequency:  Constantly  Pain Scale:  0/10  Duration:  Off/on all day  Dizziness characteristics:  Walking on uneven surface   Associated symptoms: hearing loss, ear pain and nausea.   Facial pressure  Aggravated by:  Getting up  Treatments tried: ear drops.  Improvements on treatment:  No relief      Constitution: Negative.   HENT:  Positive for ear pain and hearing loss.    Neck: neck negative.   Cardiovascular: Negative.    Eyes: Negative.    Respiratory: Negative.     Gastrointestinal:  Positive for nausea.   Endocrine: negative.   Genitourinary: Negative.    Musculoskeletal: Negative.    Skin: Negative.    Allergic/Immunologic: Negative.    Neurological:  Positive for dizziness.   Hematologic/Lymphatic: Negative.    Psychiatric/Behavioral: Negative.        Objective:     Physical Exam   Constitutional: She is oriented to person, place, and time.   HENT:   Head: Normocephalic and atraumatic.   Ears:   Right Ear: There is tenderness. " Tympanic membrane is erythematous and bulging. A middle ear effusion is present.   Left Ear: Tympanic membrane, external ear and ear canal normal.   Nose: Nose normal.   Mouth/Throat: Mucous membranes are moist. Oropharynx is clear.   Eyes: Conjunctivae are normal. Pupils are equal, round, and reactive to light. Extraocular movement intact   Neck: Neck supple.   Cardiovascular: Normal rate, regular rhythm, normal heart sounds and normal pulses.   Pulmonary/Chest: Effort normal and breath sounds normal.   Abdominal: Normal appearance.   Musculoskeletal: Normal range of motion.         General: Normal range of motion.   Neurological: no focal deficit. She is alert, oriented to person, place, and time and at baseline.   Skin: Skin is warm.   Psychiatric: Her behavior is normal. Mood, judgment and thought content normal.   Nursing note and vitals reviewed.      Assessment:     1. Right otitis media, unspecified otitis media type    2. Dizziness    3. Vertigo     EKG: NSR  Orthostatic: WNL  Results for orders placed or performed in visit on 24   SARS Coronavirus 2 Antigen, POCT Manual Read   Result Value Ref Range    SARS Coronavirus 2 Antigen Negative Negative     Acceptable Yes    POCT Glucose, Hand-Held Device   Result Value Ref Range    POC Glucose 101 70 - 110 MG/DL     CB    Plan:       Right otitis media, unspecified otitis media type  -     cefdinir (OMNICEF) 300 MG capsule; Take 1 capsule (300 mg total) by mouth 2 (two) times daily. for 10 days  Dispense: 20 capsule; Refill: 0  -     predniSONE (DELTASONE) 10 MG tablet; Take 1 tablet (10 mg total) by mouth once daily. for 5 days  Dispense: 5 tablet; Refill: 0    Dizziness  -     SARS Coronavirus 2 Antigen, POCT Manual Read  -     IN OFFICE EKG 12-LEAD (to Muse)  -     Orthostatic vital signs  -     POCT capillary blood gas; Future; Expected date: 2024  -     POCT Glucose, Hand-Held Device  -     meclizine (ANTIVERT) 12.5 mg  tablet; Take 1 tablet (12.5 mg total) by mouth 3 (three) times daily as needed for Dizziness.  Dispense: 15 tablet; Refill: 0    Vertigo

## 2024-07-27 NOTE — PATIENT INSTRUCTIONS
You must understand that you've received an Urgent Care treatment only and that you may be released before all your medical problems are known or treated. You, the patient, will arrange for follow up care as instructed.  Follow up with your PCP or specialty clinic as directed in the next 1-2 weeks if not improved or as needed.  You can call [phone] to schedule an appointment with the appropriate provider.  If your condition worsens we recommend that you receive another evaluation at the emergency room immediately or contact your primary medical clinics after hours call service to discuss your concerns.  Please return here or go to the Emergency Department for any concerns or worsening of condition.  Please if you smoke please consider quitting. [facility] Smoke cessation hotline number is [phone], available at this number is free counseling and medications to live a healthier life!         If you were prescribed a narcotic or controlled medication, do not drive or operate heavy equipment or machinery while taking these medications.

## 2024-07-28 ENCOUNTER — TELEPHONE (OUTPATIENT)
Dept: URGENT CARE | Facility: CLINIC | Age: 89
End: 2024-07-28
Payer: MEDICARE

## 2024-07-29 LAB
OHS QRS DURATION: 88 MS
OHS QTC CALCULATION: 401 MS

## 2024-07-30 ENCOUNTER — OFFICE VISIT (OUTPATIENT)
Dept: FAMILY MEDICINE | Facility: CLINIC | Age: 89
End: 2024-07-30
Payer: MEDICARE

## 2024-07-30 VITALS
SYSTOLIC BLOOD PRESSURE: 119 MMHG | OXYGEN SATURATION: 97 % | WEIGHT: 142.31 LBS | RESPIRATION RATE: 16 BRPM | HEIGHT: 64 IN | HEART RATE: 69 BPM | DIASTOLIC BLOOD PRESSURE: 79 MMHG | BODY MASS INDEX: 24.3 KG/M2

## 2024-07-30 DIAGNOSIS — I10 ESSENTIAL HYPERTENSION: ICD-10-CM

## 2024-07-30 DIAGNOSIS — J31.0 CHRONIC RHINITIS: Primary | ICD-10-CM

## 2024-07-30 DIAGNOSIS — H66.001 NON-RECURRENT ACUTE SUPPURATIVE OTITIS MEDIA OF RIGHT EAR WITHOUT SPONTANEOUS RUPTURE OF TYMPANIC MEMBRANE: ICD-10-CM

## 2024-07-30 DIAGNOSIS — R49.9 CHANGE IN VOICE: ICD-10-CM

## 2024-07-30 PROCEDURE — 99999 PR PBB SHADOW E&M-EST. PATIENT-LVL IV: CPT | Mod: PBBFAC,,, | Performed by: STUDENT IN AN ORGANIZED HEALTH CARE EDUCATION/TRAINING PROGRAM

## 2024-07-30 PROCEDURE — 99214 OFFICE O/P EST MOD 30 MIN: CPT | Mod: S$PBB,,, | Performed by: STUDENT IN AN ORGANIZED HEALTH CARE EDUCATION/TRAINING PROGRAM

## 2024-07-30 PROCEDURE — 99214 OFFICE O/P EST MOD 30 MIN: CPT | Mod: PBBFAC,PN | Performed by: STUDENT IN AN ORGANIZED HEALTH CARE EDUCATION/TRAINING PROGRAM

## 2024-07-30 RX ORDER — LEVOCETIRIZINE DIHYDROCHLORIDE 5 MG/1
5 TABLET, FILM COATED ORAL NIGHTLY
Qty: 30 TABLET | Refills: 11 | Status: SHIPPED | OUTPATIENT
Start: 2024-07-30 | End: 2025-07-30

## 2024-07-30 NOTE — PROGRESS NOTES
Subjective:       Patient ID: Selene Posey is a 89 y.o. female.    Chief Complaint: Follow-up    Ear infection  She went to urgent care  Was put on antibiotcs and steroids  She is weak and tired.  She is having less dizziness.    She has chronic dry mouth from her histamine blocker  She has tried allegra, zyrtec, claratin for her chronic rhinitis and this helps but leaves her with dry mouth  She would like to try Zyxal.    She also has meclizine and this may be causing dry mouth as well.    She feels like her voice is weaker  She is going to see ENT in a few weeks.  She has rhinitis and reflux both of which could be causing the issue          .  Patient Active Problem List   Diagnosis    GERD (gastroesophageal reflux disease)    Trigger ring finger of right hand    Status post total left knee replacement    Difficulty walking    Weakness of right lower extremity    Hyponatremia    Hypokalemia    Acquired hypothyroidism    Atherosclerosis of aorta    Essential hypertension    Paroxysmal atrial fibrillation    Hyperlipidemia    Bronchiectasis without complication    Statin intolerance    Fever blister    Esophageal spasm    Osteopenia of multiple sites     Selene has a current medication list which includes the following prescription(s): acyclovir, ascorbic acid (vitamin c), aspirin, cefdinir, clonazepam, fluticasone propionate, levothyroxine, lutein, meclizine, metoprolol succinate, omeprazole, prednisone, sodium chloride, fluocinolone acetonide oil, and levocetirizine.    Review of Systems   Constitutional:  Negative for activity change, appetite change and fever.   HENT:  Positive for ear pain and voice change. Negative for trouble swallowing.         Dry mouth   Respiratory:  Negative for shortness of breath.    Cardiovascular:  Negative for chest pain.   Gastrointestinal:  Negative for abdominal pain.   Genitourinary:  Negative for dysuria.   Integumentary:  Negative for rash.   Neurological:  Positive  for weakness.   Psychiatric/Behavioral:  Negative for dysphoric mood and sleep disturbance. The patient is not nervous/anxious.          Health Maintenance Due   Topic Date Due    TETANUS VACCINE  Never done    RSV Vaccine (Age 60+ and Pregnant patients) (1 - 1-dose 60+ series) Never done    Pneumococcal Vaccines (Age 65+) (2 of 2 - PPSV23 or PCV20) 02/06/2016    Shingles Vaccine (2 of 3) 01/30/2017    COVID-19 Vaccine (3 - 2023-24 season) 09/01/2023      Health Maintenance reviewed and discussed- encouraged vaccines.     Objective:      Physical Exam  Constitutional:       General: She is not in acute distress.     Appearance: Normal appearance. She is not ill-appearing.   HENT:      Right Ear: Tympanic membrane, ear canal and external ear normal. There is no impacted cerumen.      Left Ear: Tympanic membrane, ear canal and external ear normal. There is no impacted cerumen.   Eyes:      Conjunctiva/sclera: Conjunctivae normal.   Neck:      Thyroid: No thyromegaly.   Cardiovascular:      Rate and Rhythm: Normal rate and regular rhythm.      Heart sounds: Normal heart sounds. No murmur heard.  Pulmonary:      Effort: Pulmonary effort is normal. No respiratory distress.      Breath sounds: Normal breath sounds. No wheezing, rhonchi or rales.   Musculoskeletal:      Right lower leg: No edema.      Left lower leg: No edema.   Lymphadenopathy:      Cervical: No cervical adenopathy.   Skin:     General: Skin is warm and dry.   Neurological:      Mental Status: She is alert. Mental status is at baseline.      Gait: Gait normal.   Psychiatric:         Mood and Affect: Mood normal.         Behavior: Behavior normal.         Thought Content: Thought content normal.         Judgment: Judgment normal.         Assessment:       1. Chronic rhinitis    2. Change in voice    3. Non-recurrent acute suppurative otitis media of right ear without spontaneous rupture of tympanic membrane    4. Essential hypertension        Plan:        1. Chronic rhinitis  Comments:  change to xyzal and see if dry mouth improves  Orders:  -     levocetirizine (XYZAL) 5 MG tablet; Take 1 tablet (5 mg total) by mouth every evening.  Dispense: 30 tablet; Refill: 11    2. Change in voice  Comments:  seeing ENT very soon  Orders:  -     Cancel: Ambulatory referral/consult to ENT; Future; Expected date: 08/06/2024    3. Non-recurrent acute suppurative otitis media of right ear without spontaneous rupture of tympanic membrane  Comments:  Finish her prednisone and antibiotic    4. Essential hypertension  Assessment & Plan:  Stable. Continue current medications and regular followup.  Hypertension Medications               metoprolol succinate (TOPROL-XL) 25 MG 24 hr tablet Take 25 mg by mouth once daily.

## 2024-08-21 RX ORDER — CLONAZEPAM 0.5 MG/1
0.5 TABLET ORAL 2 TIMES DAILY PRN
Qty: 60 TABLET | Refills: 0 | Status: SHIPPED | OUTPATIENT
Start: 2024-08-21

## 2024-08-21 NOTE — TELEPHONE ENCOUNTER
----- Message from Rosa Elena Arnold sent at 8/21/2024  2:50 PM CDT -----  Contact: self  Type:  RX Refill Request    Who Called:  pt  Refill or New Rx:  new rx  RX Name and Strength:  clonazePAM (KLONOPIN) 0.5 MG tablet  How is the patient currently taking it? (ex. 1XDay):  as needed for spasms   Is this a 30 day or 90 day RX:  30  Preferred Pharmacy with phone number:    [facility] #[id number] - [place] [address] [address]  [address] [address]  Phone: [phone] Fax: [phone]  Local or Mail Order:  local  Ordering Provider:  Dr John Todd Call Back Number:  [phone]  Additional Information:  Please call pt back if there is any trouble with her getting this refilled and thanks

## 2024-08-21 NOTE — TELEPHONE ENCOUNTER
Care Due:                  Date            Visit Type   Department     Provider  --------------------------------------------------------------------------------                                EP -                              PRIMARY      [facility] FAM[facility]  Last Visit: 07-      CARE ([facility])   MEDICINE       Alisha Lopez                               -                              PRIMARY      [facility] FAM[facility]  Next Visit: 01-      CARE ([facility])   [facility]       Alisha Lopez                                                            Last  Test          Frequency    Reason                     Performed    Due Date  --------------------------------------------------------------------------------    TSH.........  12 months..  levothyroxine............  10-   10-    [facility] Embedded Care Due Messages. Reference number: [id number].   8/21/2024 2:58:38 PM CDT

## 2024-08-22 NOTE — TELEPHONE ENCOUNTER
Controlled med agreement in place-   She rarely uses this- fills once a year   reviewed and no red flags- appropriate fill history.  Visit is up to date.

## 2024-11-08 ENCOUNTER — TELEPHONE (OUTPATIENT)
Dept: FAMILY MEDICINE | Facility: CLINIC | Age: 89
End: 2024-11-08
Payer: MEDICARE

## 2024-11-08 NOTE — TELEPHONE ENCOUNTER
----- Message from Jasmyne sent at 11/8/2024  2:44 PM CST -----  Regarding: refill  Contact: patient  Type: Needs Medical Advice  Who Called:  patient   Symptoms (please be specific):    How long has patient had these symptoms:    Pharmacy name and phone #:    [facility] #[id number] - [place] [address] [address]  [address] [address]  Phone: [phone] Fax: [phone]          Best Call Back Number: [phone]    Additional Information: refill silver sulfa cream 1% call to advise seeking a prescription

## 2024-12-02 ENCOUNTER — TELEPHONE (OUTPATIENT)
Dept: FAMILY MEDICINE | Facility: CLINIC | Age: 89
End: 2024-12-02
Payer: MEDICARE

## 2024-12-02 NOTE — TELEPHONE ENCOUNTER
----- Message from Marta sent at 12/2/2024  9:24 AM CST -----  Contact: self  Type:  Needs Medical Advice, SOONER APPT REQUEST    Who Called: self  Symptoms (please be specific): pt needs to see dr asap, she sts she is experiencing dizziness. she is requesting a MRI as well.   Would the patient rather a call back or a response via [facility]? call  Best Call Back Number: [phone] (work)    Additional Information: please advise and thank you.

## 2024-12-03 ENCOUNTER — OFFICE VISIT (OUTPATIENT)
Dept: FAMILY MEDICINE | Facility: CLINIC | Age: 89
End: 2024-12-03
Payer: MEDICARE

## 2024-12-03 VITALS
BODY MASS INDEX: 24.46 KG/M2 | OXYGEN SATURATION: 96 % | SYSTOLIC BLOOD PRESSURE: 120 MMHG | RESPIRATION RATE: 16 BRPM | DIASTOLIC BLOOD PRESSURE: 68 MMHG | WEIGHT: 143.31 LBS | HEART RATE: 74 BPM | HEIGHT: 64 IN

## 2024-12-03 DIAGNOSIS — Z87.898 HISTORY OF VERTIGO: ICD-10-CM

## 2024-12-03 DIAGNOSIS — H93.8X1 EAR POPPING, RIGHT: ICD-10-CM

## 2024-12-03 DIAGNOSIS — H92.01 OTALGIA, RIGHT EAR: Primary | ICD-10-CM

## 2024-12-03 PROCEDURE — 99999 PR PBB SHADOW E&M-EST. PATIENT-LVL IV: CPT | Mod: PBBFAC,,, | Performed by: NURSE PRACTITIONER

## 2024-12-03 PROCEDURE — 99214 OFFICE O/P EST MOD 30 MIN: CPT | Mod: S$PBB,,, | Performed by: NURSE PRACTITIONER

## 2024-12-03 PROCEDURE — 99214 OFFICE O/P EST MOD 30 MIN: CPT | Mod: PBBFAC,PN | Performed by: NURSE PRACTITIONER

## 2024-12-03 RX ORDER — AZELASTINE 1 MG/ML
1 SPRAY, METERED NASAL 2 TIMES DAILY
COMMUNITY

## 2024-12-03 NOTE — PROGRESS NOTES
"Subjective:       Patient ID: Selene Posey is a 89 y.o. female.    Chief Complaint: Dizziness (Has been present since the ear infection/) and Otalgia (Right ear, had an infection some time ago)    Selene Posey (Carmen) presents to the clinic today for dizziness  Established patient within the clinic, new patient to myself    Under the care of the following:  PCP: Dr. Lopez   Otolaryngologist: Dr. Enzo Mccormack- [facility]   Cardiology: Dr. Queen  Pulmonology: Dr. Manzo    Patient Active Problem List  Diagnosis  · GERD (gastroesophageal reflux disease)  · Trigger ring finger of right hand  · Status post total left knee replacement  · Difficulty walking  · Weakness of right lower extremity  · Hyponatremia  · Hypokalemia  · Acquired hypothyroidism  · Atherosclerosis of aorta  · Essential hypertension  · Paroxysmal atrial fibrillation  · Hyperlipidemia  · Bronchiectasis without complication  · Statin intolerance  · Fever blister  · Esophageal spasm  · Osteopenia of multiple sites    Reports she has been experiencing issues with her right ear since July when she was treated for an infection to the right ear.  She has been seen by her ENT   Imaging has been ordered, but not completed.   Has a hx of bilateral sensorineural hearing loss  Is to wear ear plugs with washing hair- reports that she will use cotton. Has gotten water into ear at times- uses alcohol drops as needed- reports that this causes ear itching.  Has been using her prescribed saline, nasal sprays, allergy medication  Reports that she experiences a throbbing/ bumping sensation to the right ear in the morning time only that causes her to wake up. During the day this resolves.   Experiences intermittent dizziness in the morning, but resolves once up and out of the bed.         Review of Systems    Patient Active Problem List   Diagnosis    GERD (gastroesophageal reflux disease)    Trigger ring finger of right hand    Status post " total left knee replacement    Difficulty walking    Weakness of right lower extremity    Hyponatremia    Hypokalemia    Acquired hypothyroidism    Atherosclerosis of aorta    Essential hypertension    Paroxysmal atrial fibrillation    Hyperlipidemia    Bronchiectasis without complication    Statin intolerance    Fever blister    Esophageal spasm    Osteopenia of multiple sites       Objective:      Physical Exam  Constitutional:       General: She is not in acute distress.     Appearance: Normal appearance. She is not ill-appearing.   HENT:      Right Ear: Tympanic membrane, ear canal and external ear normal. No tenderness. No middle ear effusion. There is no impacted cerumen. Tympanic membrane is not injected or erythematous.      Left Ear: Tympanic membrane, ear canal and external ear normal. No tenderness.  No middle ear effusion. There is no impacted cerumen. Tympanic membrane is not injected or erythematous.   Eyes:      Conjunctiva/sclera: Conjunctivae normal.   Neck:      Thyroid: No thyromegaly.   Cardiovascular:      Rate and Rhythm: Normal rate and regular rhythm.      Heart sounds: Normal heart sounds. No murmur heard.  Pulmonary:      Effort: Pulmonary effort is normal. No respiratory distress.      Breath sounds: Normal breath sounds. No wheezing.   Musculoskeletal:      Right lower leg: No edema.      Left lower leg: No edema.   Lymphadenopathy:      Cervical: No cervical adenopathy.   Skin:     General: Skin is warm and dry.   Neurological:      Mental Status: She is alert. Mental status is at baseline.      Gait: Gait normal.   Psychiatric:         Mood and Affect: Mood normal.         Behavior: Behavior normal.         Thought Content: Thought content normal.         Judgment: Judgment normal.         Lab Results   Component Value Date    WBC 9.39 03/24/2024    WBC 9.39 03/24/2024    HGB 11.1 (L) 03/24/2024    HGB 11.1 (L) 03/24/2024    HCT 33.4 (L) 03/24/2024    HCT 33.4 (L) 03/24/2024      "03/24/2024     03/24/2024    CHOL 228 (H) 10/20/2023    TRIG 177 (H) 10/20/2023    HDL 51 10/20/2023    ALT 8 (L) 03/24/2024    AST 11 03/24/2024     03/24/2024    K 3.8 03/24/2024     03/24/2024    CREATININE 0.7 03/24/2024    BUN 11 03/24/2024    CO2 27 03/24/2024    TSH 0.967 10/20/2023    INR 0.9 03/22/2024     The ASCVD Risk score (Buffy DK, et al., 2019) failed to calculate for the following reasons:    The 2019 ASCVD risk score is only valid for ages 40 to 79  Visit Vitals  /68 (BP Location: Left arm, Patient Position: Sitting)   Pulse 74   Resp 16   Ht 5' 4" (1.626 m)   Wt 65 kg (143 lb 4.8 oz)   SpO2 96%   BMI 24.60 kg/m²      Assessment:       1. Otalgia, right ear    2. Ear popping, right    3. History of vertigo        Plan:       1. Otalgia, right ear    2. Ear popping, right    3. History of vertigo     Continue prescribed treatment regimen  Encouraged follow up with Dr. Mccormack related to further discussion of imaging  No follow-ups on file.      Future Appointments       Date Provider Specialty Appt Notes    1/14/2025 Alisha Lopez MD Family Medicine 6 month follow up    4/16/2025 Renea Manzo FNP Pulmonology 1 yr f/u             "

## 2025-01-03 ENCOUNTER — OFFICE VISIT (OUTPATIENT)
Dept: URGENT CARE | Facility: CLINIC | Age: 89
End: 2025-01-03
Payer: MEDICARE

## 2025-01-03 VITALS
DIASTOLIC BLOOD PRESSURE: 60 MMHG | BODY MASS INDEX: 24.24 KG/M2 | HEART RATE: 68 BPM | RESPIRATION RATE: 18 BRPM | HEIGHT: 64 IN | OXYGEN SATURATION: 98 % | SYSTOLIC BLOOD PRESSURE: 115 MMHG | TEMPERATURE: 98 F | WEIGHT: 142 LBS

## 2025-01-03 DIAGNOSIS — M79.672 LEFT FOOT PAIN: Primary | ICD-10-CM

## 2025-01-03 PROCEDURE — 99214 OFFICE O/P EST MOD 30 MIN: CPT | Mod: S$GLB,,, | Performed by: NURSE PRACTITIONER

## 2025-01-03 RX ORDER — MELOXICAM 15 MG/1
15 TABLET ORAL DAILY
Qty: 5 TABLET | Refills: 0 | Status: SHIPPED | OUTPATIENT
Start: 2025-01-03 | End: 2025-01-03

## 2025-01-03 RX ORDER — ALPRAZOLAM 1 MG/1
TABLET ORAL
COMMUNITY
Start: 2024-12-06

## 2025-01-03 RX ORDER — MELOXICAM 15 MG/1
15 TABLET ORAL DAILY
Qty: 5 TABLET | Refills: 0 | Status: SHIPPED | OUTPATIENT
Start: 2025-01-03 | End: 2025-01-08

## 2025-01-03 NOTE — PROGRESS NOTES
"Subjective:      Patient ID: Selene Posey is a 89 y.o. female.    Vitals:  height is 5' 4" (1.626 m) and weight is 64.4 kg (142 lb). Her oral temperature is 97.7 °F (36.5 °C). Her blood pressure is 115/60 and her pulse is 68. Her respiration is 18 and oxygen saturation is 98%.     Chief Complaint: Foot Pain    89 y.o. female who presents today with a chief complaint of left foot pain since last night. Patient reports slight pain over the past week which worsened severely last night. No known injury or fall. Home treatment includes Klonopin because she said the pain kept her awake.    Foot Pain  This is a new problem. The current episode started in the past 7 days. The problem occurs constantly. The problem has been rapidly worsening. The symptoms are aggravated by standing, exertion, bending, twisting and walking. Treatments tried: Klonopin. The treatment provided no relief.       Musculoskeletal:  Positive for pain.   Skin:  Negative for erythema.      Objective:     Physical Exam   Constitutional: She is oriented to person, place, and time.  Non-toxic appearance. She does not appear ill. No distress. normal  HENT:   Head: Normocephalic and atraumatic.   Eyes: Conjunctivae are normal. Extraocular movement intact   Cardiovascular: Normal rate, regular rhythm, normal heart sounds and normal pulses.   Pulmonary/Chest: Effort normal and breath sounds normal.   Abdominal: Normal appearance.   Musculoskeletal: Normal range of motion.         General: No swelling, tenderness, deformity or signs of injury. Normal range of motion.      Right lower leg: No edema.      Left lower leg: No edema.   Neurological: no focal deficit. She is alert and oriented to person, place, and time. She displays no weakness. No sensory deficit. Gait normal.   Skin: Skin is warm, dry, not diaphoretic, not pale and no rash. Capillary refill takes less than 2 seconds. No bruising and No erythema   Psychiatric: Her behavior is normal.   Vitals " reviewed.     XR FOOT COMPLETE 3 VIEW LEFT    Result Date: 1/3/2025  EXAMINATION: XR FOOT COMPLETE 3 VIEW LEFT CLINICAL HISTORY: .  Pain in left foot TECHNIQUE: AP, lateral and oblique views of the left foot were performed. COMPARISON: None FINDINGS: There is bunion formation with metatarsus varus and hallux valgus.  A fracture or other osseous abnormalities is not seen of the bones of the left foot.  Soft tissue swelling or foreign bodies are not seen.     Bunion formation with metatarsus varus and hallux valgus otherwise negative x-rays of the left foot including the 4th and 5th toes. Electronically signed by: Justin Dixon MD Date:    01/03/2025 Time:    11:24       Assessment:     1. Left foot pain        Plan:       Left foot pain  -     XR FOOT COMPLETE 3 VIEW LEFT; Future; Expected date: 01/03/2025  -     meloxicam (MOBIC) 15 MG tablet; Take 1 tablet (15 mg total) by mouth once daily. for 5 days  Dispense: 5 tablet; Refill: 0    INSTRUCTIONS  Medication as prescribed. Follow up with podiatry as scheduled.

## 2025-02-07 DIAGNOSIS — R26.9 ABNORMALITY OF GAIT: Primary | ICD-10-CM

## 2025-02-14 ENCOUNTER — OFFICE VISIT (OUTPATIENT)
Dept: FAMILY MEDICINE | Facility: CLINIC | Age: 89
End: 2025-02-14
Payer: MEDICARE

## 2025-02-14 VITALS
SYSTOLIC BLOOD PRESSURE: 110 MMHG | DIASTOLIC BLOOD PRESSURE: 62 MMHG | WEIGHT: 142.38 LBS | HEART RATE: 62 BPM | HEIGHT: 64 IN | BODY MASS INDEX: 24.31 KG/M2 | RESPIRATION RATE: 18 BRPM | OXYGEN SATURATION: 98 %

## 2025-02-14 DIAGNOSIS — K21.9 GASTROESOPHAGEAL REFLUX DISEASE WITHOUT ESOPHAGITIS: ICD-10-CM

## 2025-02-14 DIAGNOSIS — J47.9 BRONCHIECTASIS WITHOUT COMPLICATION: ICD-10-CM

## 2025-02-14 DIAGNOSIS — E03.9 ACQUIRED HYPOTHYROIDISM: ICD-10-CM

## 2025-02-14 DIAGNOSIS — M85.89 OSTEOPENIA OF MULTIPLE SITES: ICD-10-CM

## 2025-02-14 DIAGNOSIS — Z00.00 HEALTH CARE MAINTENANCE: ICD-10-CM

## 2025-02-14 DIAGNOSIS — E78.2 MIXED HYPERLIPIDEMIA: ICD-10-CM

## 2025-02-14 DIAGNOSIS — I10 ESSENTIAL HYPERTENSION: Primary | ICD-10-CM
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
## 2025-02-14 DIAGNOSIS — E55.9 VITAMIN D DEFICIENCY: ICD-10-CM

## 2025-02-14 DIAGNOSIS — Z78.9 STATIN INTOLERANCE: ICD-10-CM

## 2025-02-14 DIAGNOSIS — I48.0 PAROXYSMAL ATRIAL FIBRILLATION: ICD-10-CM

## 2025-02-14 DIAGNOSIS — T63.304S SPIDER BITE WOUND, UNDETERMINED INTENT, SEQUELA: ICD-10-CM

## 2025-02-14 PROBLEM — R26.2 DIFFICULTY WALKING: Status: RESOLVED | Noted: 2020-02-20 | Resolved: 2025-02-14

## 2025-02-14 PROBLEM — E87.1 HYPONATREMIA: Status: RESOLVED | Noted: 2022-05-16 | Resolved: 2025-02-14
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
## 2025-02-14 PROCEDURE — 99214 OFFICE O/P EST MOD 30 MIN: CPT | Mod: PBBFAC,PN | Performed by: STUDENT IN AN ORGANIZED HEALTH CARE EDUCATION/TRAINING PROGRAM

## 2025-02-14 PROCEDURE — 99999 PR PBB SHADOW E&M-EST. PATIENT-LVL IV: CPT | Mod: PBBFAC,,, | Performed by: STUDENT IN AN ORGANIZED HEALTH CARE EDUCATION/TRAINING PROGRAM

## 2025-02-14 RX ORDER — MUPIROCIN 20 MG/G
OINTMENT TOPICAL 3 TIMES DAILY
Qty: 30 G | Refills: 0 | Status: SHIPPED | OUTPATIENT
Start: 2025-02-14

## 2025-02-14 NOTE — PROGRESS NOTES
Subjective:       Patient ID: Selene Posey is a 89 y.o. female.    Chief Complaint: Follow-up    History of Present Illness    CHIEF COMPLAINT:  Ms. Posey presents today for follow up.    RECENT FALL AND BALANCE:  She experienced a fall in early January when she missed a step while talking to someone, resulting in a foot problem requiring therapy. She reports feeling off balance at times but denies dizziness.    HYPOTENSION:  She reports current blood pressure of 110, which is on the lower end of normal for her. She experiences weakness with low blood pressure. She maintains hydration with high water intake.    CARDIOVASCULAR:  Her atrial fibrillation has been stable without complications. She continues baby aspirin.    GASTROINTESTINAL:  She experiences occasional mild reflux symptoms. She has switched to Boost nutritional drinks for breakfast instead of large meals, which she reports has been helpful.       Review of Systems   Constitutional:  Negative for activity change and appetite change.   Respiratory:  Negative for shortness of breath.    Cardiovascular:  Negative for chest pain.   Gastrointestinal:  Negative for abdominal pain.   Genitourinary:  Negative for dysuria.   Integumentary:  Negative for rash.   Psychiatric/Behavioral:  Negative for depressed mood and sleep disturbance. The patient is not nervous/anxious.       Patient Active Problem List   Diagnosis    GERD (gastroesophageal reflux disease)    Trigger ring finger of right hand    Status post total left knee replacement    Weakness of right lower extremity    Hypokalemia    Acquired hypothyroidism    Atherosclerosis of aorta    Essential hypertension    Paroxysmal atrial fibrillation    Hyperlipidemia    Bronchiectasis without complication    Statin intolerance    Fever blister    Esophageal spasm    Osteopenia of multiple sites    Fall     Selene has a current medication list which includes the following prescription(s): acyclovir,  ascorbic acid (vitamin c), aspirin, azelastine, clonazepam, fluticasone propionate, levothyroxine, lutein, meclizine, metoprolol succinate, omeprazole, sodium chloride, alprazolam, levocetirizine, and mupirocin.        Health Maintenance Due   Topic Date Due    TETANUS VACCINE  Never done    RSV Vaccine (Age 60+ and Pregnant patients) (1 - 1-dose 75+ series) Never done    Pneumococcal Vaccines (Age 50+) (2 of 2 - PPSV23) 02/06/2016    Shingles Vaccine (2 of 3) 01/30/2017    Influenza Vaccine (1) 09/01/2024    COVID-19 Vaccine (3 - 2024-25 season) 09/01/2024      Health Maintenance reviewed and discussed- encourage vaccines.     Objective:      Physical Exam  Constitutional:       General: She is not in acute distress.     Appearance: Normal appearance. She is not ill-appearing.   Eyes:      Conjunctiva/sclera: Conjunctivae normal.   Cardiovascular:      Rate and Rhythm: Normal rate and regular rhythm.      Heart sounds: Normal heart sounds. No murmur heard.  Pulmonary:      Effort: Pulmonary effort is normal. No respiratory distress.      Breath sounds: Normal breath sounds. No wheezing or rales.   Musculoskeletal:      Right lower leg: No edema.      Left lower leg: No edema.   Lymphadenopathy:      Cervical: No cervical adenopathy.   Skin:     General: Skin is warm and dry.   Neurological:      Mental Status: She is alert. Mental status is at baseline.      Gait: Gait normal.   Psychiatric:         Mood and Affect: Mood normal.         Behavior: Behavior normal.         Thought Content: Thought content normal.         Judgment: Judgment normal.             Assessment:       Assessment & Plan    1. Assessed fall risk and current therapy for foot issue  2. Evaluated blood pressure trends, noting consistently low readings  3. Considered potential need to adjust metoprolol dosage if low blood pressure persists  4. Reviewed AFib management, noting no recent issues  5. Evaluated need for bone density scan due to time  elapsed since last test  6. Considered alternative topical treatment options in lieu of unavailable silver-based cream           Plan:       1. Essential hypertension  Assessment & Plan:  BP Readings from Last 3 Encounters:   02/14/25 110/62   01/03/25 115/60   12/03/24 120/68     Stable. Continue current medications and regular followup.  Hypertension Medications               metoprolol succinate (TOPROL-XL) 25 MG 24 hr tablet Take 25 mg by mouth once daily.              Orders:  -     CBC Without Differential; Future; Expected date: 02/14/2025  -     Comprehensive Metabolic Panel; Future; Expected date: 02/14/2025  -     Microalbumin/Creatinine Ratio, Urine; Future; Expected date: 02/14/2025    2. Health care maintenance    3. Fall, subsequent encounter  Assessment & Plan:  Doing well with therapy      4. Statin intolerance    5. Bronchiectasis without complication  Assessment & Plan:  Stable. Continue current medications and regular followup.  asymptomatic      6. Paroxysmal atrial fibrillation  Assessment & Plan:  Stable. Continue current medications and regular followup.        7. Mixed hyperlipidemia  Assessment & Plan:  Stable. Cannot tolerate zetia  Off of the medication    Orders:  -     Lipid Panel; Future; Expected date: 02/14/2025    8. Acquired hypothyroidism  Assessment & Plan:  Lab Results   Component Value Date    TSH 0.967 10/20/2023     Stable. Continue current medications and regular followup.      Orders:  -     TSH; Future; Expected date: 02/14/2025    9. Gastroesophageal reflux disease without esophagitis  Assessment & Plan:  Stable. Continue current medications and regular followup.  No dysphagia      10. Osteopenia of multiple sites  Assessment & Plan:  Density in 2022  14.6% major risk 4.3% hip  On ca/d and weight bearing  Repeat in 2024    Orders:  -     Vitamin D; Future; Expected date: 02/14/2025  -     DXA Bone Density Axial Skeleton 1 or More Sites W/TBS (XPD); Future; Expected date:  02/14/2025    11. Vitamin D deficiency  -     Vitamin D; Future; Expected date: 02/14/2025    12. Spider bite wound, undetermined intent, sequela  -     mupirocin (BACTROBAN) 2 % ointment; Apply topically 3 (three) times daily.  Dispense: 30 g; Refill: 0         This note was generated with the assistance of ambient listening technology. Verbal consent was obtained by the patient and accompanying visitor(s) for the recording of patient appointment to facilitate this note. I attest to having reviewed and edited the generated note for accuracy, though some syntax or spelling errors may persist. Please contact the author of this note for any clarification.

## 2025-02-14 NOTE — ASSESSMENT & PLAN NOTE
Lab Results   Component Value Date    TSH 0.967 10/20/2023     Stable. Continue current medications and regular followup.

## 2025-02-14 NOTE — ASSESSMENT & PLAN NOTE
BP Readings from Last 3 Encounters:   02/14/25 110/62   01/03/25 115/60   12/03/24 120/68     Stable. Continue current medications and regular followup.  Hypertension Medications               metoprolol succinate (TOPROL-XL) 25 MG 24 hr tablet Take 25 mg by mouth once daily.

## 2025-02-17 ENCOUNTER — CLINICAL SUPPORT (OUTPATIENT)
Dept: REHABILITATION | Facility: HOSPITAL | Age: 89
End: 2025-02-17
Payer: MEDICARE

## 2025-02-17 DIAGNOSIS — G57.82 INTERDIGITAL NEUROMA OF LEFT FOOT: Primary | ICD-10-CM

## 2025-02-17 DIAGNOSIS — R26.9 ABNORMALITY OF GAIT: ICD-10-CM

## 2025-02-17 DIAGNOSIS — Z74.09 IMPAIRED FUNCTIONAL MOBILITY AND ACTIVITY TOLERANCE: ICD-10-CM

## 2025-02-17 PROCEDURE — 97161 PT EVAL LOW COMPLEX 20 MIN: CPT | Mod: PN

## 2025-02-17 PROCEDURE — 97530 THERAPEUTIC ACTIVITIES: CPT | Mod: PN

## 2025-02-17 NOTE — LETTER
February 18, 2025  Grupo Hendrix IV, DPM  [address] [facility]  [facility] [address]    To whom it may concern,     The attached plan of care is being sent to you for review and reference.    You may indicate your approval by signing the document electronically, or by faxing/mailing a signed copy of the final page of this document back to the attention of Lori Piper, PT:         Plan of Care 2/17/25   Effective from: 2/17/2025  Effective to: 5/17/2025    Plan ID: [id number]            Participants as of Finalize on 2/18/2025    Name Type Comments Contact Info    Grupo Hendrix IV, DPM Referring Provider  [phone]    Lori Piper PT Physical Therapist         Last Plan Note     Author: Lori Piper PT Status: Signed Last edited: 2/17/2025 12:30 PM         Outpatient Rehab    Physical Therapy Evaluation    Patient Name: Janelle Posey  MRN: [id number]  YOB: 1935  Today's Date: 2/18/2025    Therapy Diagnosis:   Encounter Diagnoses   Name Primary?    Abnormality of gait     Interdigital neuroma of left foot Yes    Impaired functional mobility and activity tolerance      Physician: Grupo Hendrix IV, DPM    Physician Orders: Eval and Treat  Medical Diagnosis: R26.9 (ICD-10-CM) - Abnormality of gait  1  Visit # / Visits Authorized:  1 / 1   Date of Evaluation:  2/17/2025   Insurance Authorization Period: 2/7/25 to 2/7/26  Plan of Care Certification:  2/17/2025 to 5/17/2025      Time In: 1230   Time Out: 1330  Total Time: 60   Total Billable Time: 60    Intake Outcome Measure for FOTO Survey    Therapist reviewed FOTO scores for Janelle Posey on 2/17/2025.   FOTO report - see Media section or FOTO account episode details.     Intake Score: n/a %         Subjective   History of Present Illness  Janelle is a 89 y.o. female who reports to physical therapy with a chief concern of I had such excruciating pain in my foot, that I could hardly walk; it's better now after the steroid injection,  but the doctor feels I need some exercise.. According to the patient's chart, Janelle has a past medical history of Bronchiectasis, CKD (chronic kidney disease), COVID-19 virus infection, GERD (gastroesophageal reflux disease), High cholesterol, Hypertension, Paroxysmal atrial fibrillation, and Thyroid disease. Janelle has a past surgical history that includes Appendectomy; Hysterectomy; Eye surgery (August 2022); Breast surgery (1975); Tonsillectomy (1944); and Joint replacement (2020).    The patient reports a medical diagnosis of R26.9 Gait Abnormality. The patient has experienced this issue since 02/03/25.           History of Present Condition/Illness: Janelle stated that she fell about a year ago - didn't see the step and missed it, causing her to fall.  This was in January 2024.  She reports no falls since this time, but stated that when she went to see Dr. Hendrix he told her that she needed to get some therapy to help with her balance.  Janelle stated that she doesn't do any type of exercise at home.  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  She has changed her shoes - at the [facility]'s suggestion. Now wearing some Sketchers that have a more firm sole as opposed to her usual shoes that are too flexible.  She is not entirely happy with her shoes - doesn't feel like they fit right.  Patient also notes that her LLE is not as strong as it should be.  She had a TKA about 5 years ago and has not kept up with exercise.  Steroid injection into neuroma when she saw the foot doctor.  Immediate relief of pain that has not returned.     Activities of Daily Living      General Prior Level of Function Comments: Independent, lives alone, drives, works, but no formal exercise; sedentary otherwise  General Current Level of Function Comments: As noted previously; Lifestyle has been the same for past 4-5 years; Moves around at home as she needs to, drives, works as she needs to as a relator; but does not do any type of formal  "exercise.       Previously independent with activities of daily living? Yes     Currently independent with activities of daily living? Yes              Pain     Patient reports a current pain level of 0/10. Pain at best is reported as 0/10. Pain at worst is reported as 6/10.   Location: patient reports that her foot pain was severe prior to recent injection; now does not have pain at all.  Clinical Progression (since onset): Resolved         Living Arrangements  Living Situation  Housing: Home independently  Living Arrangements: Alone  Support Systems: Children, Family members    Home Setup  Home Access: Stairs with rails  Entrance Stairs - Number of Steps: 2  Entrance Stairs - Rails: Both  Number of Levels in Home: Two level  Existing Accessibility Options: Other (Comment)  Other Existing Accessibility Options: patient lives on the main floor; does not have a need to go upstairs to 2nd floor; but can if she had too. Has a railing on steps.  Patient is able to live on main floor of home: Yes        Employment  Patient does not report that: Does the patient's condition impact their ability to work?  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 When asked about fulltime, she stated "yes"       Past Medical History/Physical Systems Review:   Selene Posey  has a past medical history of Bronchiectasis, CKD (chronic kidney disease), COVID-19 virus infection, GERD (gastroesophageal reflux disease), High cholesterol, Hypertension, Paroxysmal atrial fibrillation, and Thyroid disease.    Selene Posey  has a past surgical history that includes Appendectomy; Hysterectomy; Eye surgery (August 2022); Breast surgery (1975); Tonsillectomy (1944); and Joint replacement (2020).    Selene has a current medication list which includes the following prescription(s): acyclovir, alprazolam, ascorbic acid (vitamin c), aspirin, azelastine, clonazepam, fluticasone propionate, " levocetirizine, levothyroxine, lutein, meclizine, metoprolol succinate, mupirocin, omeprazole, and sodium chloride.    Review of patient's allergies indicates:   Allergen Reactions    Azithromycin     Codeine     Erythropoietin analogues     Pcn [penicillins]     Sulfa (sulfonamide antibiotics)     Zetia [ezetimibe]     Atorvastatin      Other reaction(s): Joint pain    Fenofibrate      Made patient hoarse.        Objective   Posture                 Patient demonstrates slight forward head with rounding of shoulders, Adequate cervical and lumbar lordosis noted. Adequate arch in foot, no supination/pronation of feet noted with ambulation; Valgus right knee.     Knee Observations  Right Knee Observations  Not Present: Edema and Straight Leg Raise Extensor Lag  Left Knee Observations  Not Present: Edema and Straight Leg Raise Extensor Lag          Ankle/Foot Observations     Patient has hammer toes noted bilateral, Ricks's neuroma on left foot between 2/3 digits - injection helpful in reducing pain and edema. Adequate arch noted in foot;  Patient has good sensation bilateral feet and ankle       Lower Extremity Sensation  General Lumbar/Lower Extremity Sensation  Intact: Right and Left  Right Lumbar/Lower Extremity Sensation  Intact: Light Touch, Sharp/Dull Discrimination, Static Two Point Discrimination, Dynamic Two Point Discrimination, Kinesthesia, and Proprioception  Right Lumbar/Lower Extremity Sensation Stocking Glove Pattern: No    Left Lumbar/Lower Extremity Sensation  Intact: Light Touch, Static Two Point Discrimination, Dynamic Two Point Discrimination, Sharp/Dull Discrimination, Kinesthesia, and Proprioception  Left Lumbar/Lower Extremity Sensation Stocking Glove Pattern: No             Right Lower Extremity Reflexes  Patellar, L4: Normal (2+)         Achilles, S1: Normal (2+)         Left Lower Extremity Reflexes  Patellar, L4: Normal (2+)          Achilles, S1: Normal (2+)              Ankle/Foot  Swelling  Location of Measurement Right  (cm) Left  (cm)   Metatarsal Head       Figure 8       Malleolus       No edema/swelling noted in left ankle of foot          Ankle/Foot Range of Motion   Right Ankle/Foot   Active (deg) Passive (deg) Pain   Dorsiflexion (KE) 4       Dorsiflexion (KF) 5       Plantar Flexion 55       Ankle Inversion 30       Ankle Eversion 15       Subtalar Inversion         Subtalar Eversion         Great Toe MTP Flexion         Great Toe MTP Extension         Great Toe IP Flexion             Left Ankle/Foot   Active (deg) Passive (deg) Pain   Dorsiflexion (KE) 3       Dorsiflexion (KF) 4       Plantar Flexion 55       Ankle Inversion 28       Ankle Eversion 15       Subtalar Inversion         Subtalar Eversion         Great Toe MTP Flexion         Great Toe MTP Extension         Great Toe IP Flexion                            Hip Strength - Planes of Motion   Right Strength Right Pain Left Strength Left  Pain   Flexion (L2) 4   4-     Extension 3+   3     ABduction 4-   3+     ADduction 3+   3+     Internal Rotation 3+   3     External Rotation 4-   3+         Knee Strength   Right Strength Right Pain Left Strength Left  Pain   Flexion (S2) 3+   3     Prone Flexion 3+   3     Extension (L3) 4+   4       Knee Extensor Lag  No Lag: Right and Left       Ankle/Foot Strength - Planes of Motion   Right Strength Right Pain Left Strength Left  Pain   Dorsiflexion (L4) 4   4-     Plantar Flexion (S1) 4-   3+     Inversion 4-   4-     Eversion 4-   4-     Great Toe Flexion 3+   3+     Great Toe Extension (L5) 3+   3+     Lesser Toes Flexion 3   3     Lesser Toes Extension 3   3               Fall Risk  Functional mobility test results suggest the patient is not: At Risk for Falls  Four Stage Balance Test  Narrow Base of Support: 60 sec sec  Tandem Stand - Right Foot in Front: 10 sec  Tandem Stand - Left Foot in Front: 20 sec  Semi-Tandem Stand - Right Foot in Front: 60 sec  Semi-Tandem Stand - Left  Foot in Front: 60 sec  Single Leg Stand - Right Foot: 30 sec  Single Leg Stand - Left Foot: 10 sec       Boogie Balance  Boogie Balance Scale  1. Sitting to Standing: Able to stand without using hands and stabilize independently  2. Standing Unsupported: Able to stand safely for 2 minutes  3. Sitting with Back Unsupported but Feet Supported on Floor or on a Stool: Able to sit safely and securely for 2 minutes  4. Standing to Sitting: Sits safely with minimal use of hands  5.  Transfers: Able to transfer safely with minor use of hands  6. Standing Unsupported with Eyes Closed: Able to stand 10 seconds with supervision  7. Standing Unsupported with Feet Together: Able to place feet together independently and stand 1 minute safely  8. Reach Forward with Outstretched Arm While Standing: Can reach forward 5 cm (2 inches)  9.  Object from Floor from a Standing Position: Able to  slipper safely and easily  10. Turning to Look Behind Over Left and Right Shoulders While Standing: Looks behind one side only other side shows less weight shift  11. Turn 360 Degrees: Able to turn 360 degrees safely but slowly  12. Place Alternate Foot on Step or Stool While Standing Unsupported: Able to stand independently and complete 8 steps in greater than 20 seconds  13. Standing Unsupported One Foot in Front: Able to place foot ahead independently and hold 30 seconds  14. Standing on One Leg: Able to lift leg independently and hold 5-10 seconds  Boogie Balance Score: 47  Bogoie Balance Fall Risk: Low    Interpretation:  41-56 = Low Fall Risk  21-40 = Medium Fall Risk  0-20 = High Fall Risk        Ambulation Assistance Required  Surface With  Assistive Device Without Assistive Device Details   Level   Independent      Uneven   Independent     Curb   Independent       Stairs Assistance Required   Assistance Level Upper Extremity Support Pattern   Ascending Independent One rail Reciprocal   Descending Independent Two rails Non-reciprocal  "       Ambulation Details    Decreased clearance of left over right foot noted on occasion;  Slides toes on ground at times; Improves with cueing and patient unaware that she is doing this.          Treatment:  Therapeutic Activity  Therapeutic Activity 1: Nu-Step x 15 mins Level 4  Therapeutic Activity 2: Toe-Taps - 6" step with single hand assit x 2 mins  Therapeutic Activity 3: Step-ups: 6" step - 12 x's leading with each LE  Therapeutic Activity 4: Quad sets - 5" hold x 1 min  Therapeutic Activity 5: Bridges x 10  Therapeutic Activity 6: SLR x 10 each leg    Assessment & Plan   Assessment  Janelle presents with a condition of Low complexity.   Presentation of Symptoms: Stable  Will Comorbidities Impact Care: No       Impairments: Abnormal gait, Impaired physical strength, Abnormal or restricted range of motion, Impaired balance    Patient Goal for Therapy (PT): To improve my balance and LE strength  Prognosis: Good  Assessment Details: Patient is 89 yo with fall over a year ago - nothing since; Recent development of neuroma left foot that caused her sever pain with walking.  Patient had steroid injection which alleviated her pain immediately.  Patient's DPM recommended she start some therapy to work on her gait/balance and reduce her fall risk.  Patient's fall risk is really quite low, she does demonstrate some weakness in her LLE versus her Right stemming from TKA on left 5 years ago.  Aes a result her SLS and tandem stance on the left is impaired versus her right side.  Patient will benefit from Skilled physical therapy intervention to address her deficits noted above in order to maximize her function and further reduce her fall risk.     Plan  From a physical therapy perspective, the patient would benefit from: Skilled Rehab Services    Planned therapy interventions include: Therapeutic exercise, Therapeutic activities, Neuromuscular re-education, Manual therapy, and Gait training.            Visit Frequency: " 2 times Per Week for 6 Weeks.       This plan was discussed with Patient and Therapy assistant.   Discussion participants: Agreed Upon Plan of Care             Patient's spiritual, cultural, and educational needs considered and patient agreeable to plan of care and goals.     Education  Education was done with Patient. The patient's learning style includes Demonstration and Listening. The patient Verbalizes understanding and Demonstrates understanding.         Education of role of PT; POC to address strength and balance deficits;  Will develop HEP as treatment goes along.        Goals:   Active       LTG's   6 weeks        Demonstrate ability to perform all household activities with no limitation        Start:  02/17/25    Expected End:  04/04/25            Demonstrate ability to perform all work tasks with no limitation.        Start:  02/17/25    Expected End:  04/04/25            Able to ambulate community required distances with no limitation        Start:  02/17/25    Expected End:  04/04/25            Ascend/descend flight of steps with railing with no limitation        Start:  02/17/25    Expected End:  04/04/25            Demonstrate independence with HEP for continued improvement in function        Start:  02/17/25    Expected End:  04/04/25               STG's   3 weeks        Demonstrate improvement in recent symptoms to progress toward long term goals        Start:  02/17/25    Expected End:  03/14/25            Correct postural deficits to reduce any pain and promote improvement in postural awareness for injury prevention        Start:  02/17/25    Expected End:  03/14/25            Demonstrate compliance with initial exercise program        Start:  02/17/25    Expected End:  03/14/25                Lori Piper PT           Current Participants as of 2/18/2025    Name Type Comments Contact Info    Grupo Hendrix IV, DPM Referring Provider  [phone]    Signature pending    Lori Piper PT Physical  Therapist                  Sincerely,      Lori Piper, PT  [facility]                                                            Dear Lori Piper, PT,    RE: Ms. Selene Posey, MRN: [id number]    I certify that I have reviewed the attached plan of care and agree to the details within.        ___________________________  ___________________________  Patient Printed Name    Patient Signed Name      ___________________________  Date and Time

## 2025-02-18 NOTE — PROGRESS NOTES
Outpatient Rehab    Physical Therapy Evaluation    Patient Name: Janelle Posey  MRN: [id number]  YOB: 1935  Today's Date: 2/18/2025    Therapy Diagnosis:   Encounter Diagnoses   Name Primary?    Abnormality of gait     Interdigital neuroma of left foot Yes    Impaired functional mobility and activity tolerance      Physician: Grupo Hendrix IV, DPM    Physician Orders: Eval and Treat  Medical Diagnosis: R26.9 (ICD-10-CM) - Abnormality of gait  1  Visit # / Visits Authorized:  1 / 1   Date of Evaluation:  2/17/2025   Insurance Authorization Period: 2/7/25 to 2/7/26  Plan of Care Certification:  2/17/2025 to 5/17/2025      Time In: 1230   Time Out: 1330  Total Time: 60   Total Billable Time: 60    Intake Outcome Measure for FOTO Survey    Therapist reviewed FOTO scores for Janelle Posey on 2/17/2025.   FOTO report - see Media section or FOTO account episode details.     Intake Score: n/a %         Subjective   History of Present Illness  Janelle is a 89 y.o. female who reports to physical therapy with a chief concern of I had such excruciating pain in my foot, that I could hardly walk; it's better now after the steroid injection, but the doctor feels I need some exercise.. According to the patient's chart, Janelle has a past medical history of Bronchiectasis, CKD (chronic kidney disease), COVID-19 virus infection, GERD (gastroesophageal reflux disease), High cholesterol, Hypertension, Paroxysmal atrial fibrillation, and Thyroid disease. Janelle has a past surgical history that includes Appendectomy; Hysterectomy; Eye surgery (August 2022); Breast surgery (1975); Tonsillectomy (1944); and Joint replacement (2020).    The patient reports a medical diagnosis of R26.9 Gait Abnormality. The patient has experienced this issue since 02/03/25.           History of Present Condition/Illness: Janelle stated that she fell about a year ago - didn't see the step and missed it, causing her to fall.  This was in January  2024.  She reports no falls since this time, but stated that when she went to see Dr. Hendrix he told her that she needed to get some therapy to help with her balance.  Janelle stated that she doesn't do any type of exercise at home.  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  She has changed her shoes - at the [facility]'s suggestion. Now wearing some Sketchers that have a more firm sole as opposed to her usual shoes that are too flexible.  She is not entirely happy with her shoes - doesn't feel like they fit right.  Patient also notes that her LLE is not as strong as it should be.  She had a TKA about 5 years ago and has not kept up with exercise.  Steroid injection into neuroma when she saw the foot doctor.  Immediate relief of pain that has not returned.     Activities of Daily Living      General Prior Level of Function Comments: Independent, lives alone, drives, works, but no formal exercise; sedentary otherwise  General Current Level of Function Comments: As noted previously; Lifestyle has been the same for past 4-5 years; Moves around at home as she needs to, drives, works as she needs to as a relator; but does not do any type of formal exercise.       Previously independent with activities of daily living? Yes     Currently independent with activities of daily living? Yes              Pain     Patient reports a current pain level of 0/10. Pain at best is reported as 0/10. Pain at worst is reported as 6/10.   Location: patient reports that her foot pain was severe prior to recent injection; now does not have pain at all.  Clinical Progression (since onset): Resolved         Living Arrangements  Living Situation  Housing: Home independently  Living Arrangements: Alone  Support Systems: Children, Family members    Home Setup  Home Access: Stairs with rails  Entrance Stairs - Number of Steps: 2  Entrance Stairs - Rails: Both  Number of Levels in Home: Two level  Existing Accessibility Options: Other  "(Comment)  Other Existing Accessibility Options: patient lives on the main floor; does not have a need to go upstairs to 2nd floor; but can if she had too. Has a railing on steps.  Patient is able to live on main floor of home: Yes        Employment  Patient does not report that: Does the patient's condition impact their ability to work?  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 When asked about fulltime, she stated "yes"       Past Medical History/Physical Systems Review:   Selene Posey  has a past medical history of Bronchiectasis, CKD (chronic kidney disease), COVID-19 virus infection, GERD (gastroesophageal reflux disease), High cholesterol, Hypertension, Paroxysmal atrial fibrillation, and Thyroid disease.    Selene Posey  has a past surgical history that includes Appendectomy; Hysterectomy; Eye surgery (August 2022); Breast surgery (1975); Tonsillectomy (1944); and Joint replacement (2020).    Selene has a current medication list which includes the following prescription(s): acyclovir, alprazolam, ascorbic acid (vitamin c), aspirin, azelastine, clonazepam, fluticasone propionate, levocetirizine, levothyroxine, lutein, meclizine, metoprolol succinate, mupirocin, omeprazole, and sodium chloride.    Review of patient's allergies indicates:   Allergen Reactions    Azithromycin     Codeine     Erythropoietin analogues     Pcn [penicillins]     Sulfa (sulfonamide antibiotics)     Zetia [ezetimibe]     Atorvastatin      Other reaction(s): Joint pain    Fenofibrate      Made patient hoarse.        Objective   Posture                 Patient demonstrates slight forward head with rounding of shoulders, Adequate cervical and lumbar lordosis noted. Adequate arch in foot, no supination/pronation of feet noted with ambulation; Valgus right knee.     Knee Observations  Right Knee Observations  Not Present: Edema and Straight Leg Raise Extensor Lag  Left Knee " Observations  Not Present: Edema and Straight Leg Raise Extensor Lag          Ankle/Foot Observations     Patient has hammer toes noted bilateral, Ricks's neuroma on left foot between 2/3 digits - injection helpful in reducing pain and edema. Adequate arch noted in foot;  Patient has good sensation bilateral feet and ankle       Lower Extremity Sensation  General Lumbar/Lower Extremity Sensation  Intact: Right and Left  Right Lumbar/Lower Extremity Sensation  Intact: Light Touch, Sharp/Dull Discrimination, Static Two Point Discrimination, Dynamic Two Point Discrimination, Kinesthesia, and Proprioception  Right Lumbar/Lower Extremity Sensation Stocking Glove Pattern: No    Left Lumbar/Lower Extremity Sensation  Intact: Light Touch, Static Two Point Discrimination, Dynamic Two Point Discrimination, Sharp/Dull Discrimination, Kinesthesia, and Proprioception  Left Lumbar/Lower Extremity Sensation Stocking Glove Pattern: No             Right Lower Extremity Reflexes  Patellar, L4: Normal (2+)         Achilles, S1: Normal (2+)         Left Lower Extremity Reflexes  Patellar, L4: Normal (2+)          Achilles, S1: Normal (2+)              Ankle/Foot Swelling  Location of Measurement Right  (cm) Left  (cm)   Metatarsal Head       Figure 8       Malleolus       No edema/swelling noted in left ankle of foot          Ankle/Foot Range of Motion   Right Ankle/Foot   Active (deg) Passive (deg) Pain   Dorsiflexion (KE) 4       Dorsiflexion (KF) 5       Plantar Flexion 55       Ankle Inversion 30       Ankle Eversion 15       Subtalar Inversion         Subtalar Eversion         Great Toe MTP Flexion         Great Toe MTP Extension         Great Toe IP Flexion             Left Ankle/Foot   Active (deg) Passive (deg) Pain   Dorsiflexion (KE) 3       Dorsiflexion (KF) 4       Plantar Flexion 55       Ankle Inversion 28       Ankle Eversion 15       Subtalar Inversion         Subtalar Eversion         Great Toe MTP Flexion          Great Toe MTP Extension         Great Toe IP Flexion                            Hip Strength - Planes of Motion   Right Strength Right Pain Left Strength Left  Pain   Flexion (L2) 4   4-     Extension 3+   3     ABduction 4-   3+     ADduction 3+   3+     Internal Rotation 3+   3     External Rotation 4-   3+         Knee Strength   Right Strength Right Pain Left Strength Left  Pain   Flexion (S2) 3+   3     Prone Flexion 3+   3     Extension (L3) 4+   4       Knee Extensor Lag  No Lag: Right and Left       Ankle/Foot Strength - Planes of Motion   Right Strength Right Pain Left Strength Left  Pain   Dorsiflexion (L4) 4   4-     Plantar Flexion (S1) 4-   3+     Inversion 4-   4-     Eversion 4-   4-     Great Toe Flexion 3+   3+     Great Toe Extension (L5) 3+   3+     Lesser Toes Flexion 3   3     Lesser Toes Extension 3   3               Fall Risk  Functional mobility test results suggest the patient is not: At Risk for Falls  Four Stage Balance Test  Narrow Base of Support: 60 sec sec  Tandem Stand - Right Foot in Front: 10 sec  Tandem Stand - Left Foot in Front: 20 sec  Semi-Tandem Stand - Right Foot in Front: 60 sec  Semi-Tandem Stand - Left Foot in Front: 60 sec  Single Leg Stand - Right Foot: 30 sec  Single Leg Stand - Left Foot: 10 sec       Boogie Balance  Boogie Balance Scale  1. Sitting to Standing: Able to stand without using hands and stabilize independently  2. Standing Unsupported: Able to stand safely for 2 minutes  3. Sitting with Back Unsupported but Feet Supported on Floor or on a Stool: Able to sit safely and securely for 2 minutes  4. Standing to Sitting: Sits safely with minimal use of hands  5.  Transfers: Able to transfer safely with minor use of hands  6. Standing Unsupported with Eyes Closed: Able to stand 10 seconds with supervision  7. Standing Unsupported with Feet Together: Able to place feet together independently and stand 1 minute safely  8. Reach Forward with Outstretched Arm While  "Standing: Can reach forward 5 cm (2 inches)  9.  Object from Floor from a Standing Position: Able to  slipper safely and easily  10. Turning to Look Behind Over Left and Right Shoulders While Standing: Looks behind one side only other side shows less weight shift  11. Turn 360 Degrees: Able to turn 360 degrees safely but slowly  12. Place Alternate Foot on Step or Stool While Standing Unsupported: Able to stand independently and complete 8 steps in greater than 20 seconds  13. Standing Unsupported One Foot in Front: Able to place foot ahead independently and hold 30 seconds  14. Standing on One Leg: Able to lift leg independently and hold 5-10 seconds  Boogie Balance Score: 47  Boogie Balance Fall Risk: Low    Interpretation:  41-56 = Low Fall Risk  21-40 = Medium Fall Risk  0-20 = High Fall Risk        Ambulation Assistance Required  Surface With  Assistive Device Without Assistive Device Details   Level   Independent      Uneven   Independent     Curb   Independent       Stairs Assistance Required   Assistance Level Upper Extremity Support Pattern   Ascending Independent One rail Reciprocal   Descending Independent Two rails Non-reciprocal        Ambulation Details    Decreased clearance of left over right foot noted on occasion;  Slides toes on ground at times; Improves with cueing and patient unaware that she is doing this.          Treatment:  Therapeutic Activity  Therapeutic Activity 1: Nu-Step x 15 mins Level 4  Therapeutic Activity 2: Toe-Taps - 6" step with single hand assit x 2 mins  Therapeutic Activity 3: Step-ups: 6" step - 12 x's leading with each LE  Therapeutic Activity 4: Quad sets - 5" hold x 1 min  Therapeutic Activity 5: Bridges x 10  Therapeutic Activity 6: SLR x 10 each leg    Assessment & Plan   Assessment  Janelle presents with a condition of Low complexity.   Presentation of Symptoms: Stable  Will Comorbidities Impact Care: No       Impairments: Abnormal gait, Impaired physical " strength, Abnormal or restricted range of motion, Impaired balance    Patient Goal for Therapy (PT): To improve my balance and LE strength  Prognosis: Good  Assessment Details: Patient is 88 yo with fall over a year ago - nothing since; Recent development of neuroma left foot that caused her sever pain with walking.  Patient had steroid injection which alleviated her pain immediately.  Patient's DPM recommended she start some therapy to work on her gait/balance and reduce her fall risk.  Patient's fall risk is really quite low, she does demonstrate some weakness in her LLE versus her Right stemming from TKA on left 5 years ago.  Aes a result her SLS and tandem stance on the left is impaired versus her right side.  Patient will benefit from Skilled physical therapy intervention to address her deficits noted above in order to maximize her function and further reduce her fall risk.     Plan  From a physical therapy perspective, the patient would benefit from: Skilled Rehab Services    Planned therapy interventions include: Therapeutic exercise, Therapeutic activities, Neuromuscular re-education, Manual therapy, and Gait training.            Visit Frequency: 2 times Per Week for 6 Weeks.       This plan was discussed with Patient and Therapy assistant.   Discussion participants: Agreed Upon Plan of Care             Patient's spiritual, cultural, and educational needs considered and patient agreeable to plan of care and goals.     Education  Education was done with Patient. The patient's learning style includes Demonstration and Listening. The patient Verbalizes understanding and Demonstrates understanding.         Education of role of PT; POC to address strength and balance deficits;  Will develop HEP as treatment goes along.        Goals:   Active       LTG's   6 weeks        Demonstrate ability to perform all household activities with no limitation        Start:  02/17/25    Expected End:  04/04/25             Demonstrate ability to perform all work tasks with no limitation.        Start:  02/17/25    Expected End:  04/04/25            Able to ambulate community required distances with no limitation        Start:  02/17/25    Expected End:  04/04/25            Ascend/descend flight of steps with railing with no limitation        Start:  02/17/25    Expected End:  04/04/25            Demonstrate independence with HEP for continued improvement in function        Start:  02/17/25    Expected End:  04/04/25               STG's   3 weeks        Demonstrate improvement in recent symptoms to progress toward long term goals        Start:  02/17/25    Expected End:  03/14/25            Correct postural deficits to reduce any pain and promote improvement in postural awareness for injury prevention        Start:  02/17/25    Expected End:  03/14/25            Demonstrate compliance with initial exercise program        Start:  02/17/25    Expected End:  03/14/25                Lori Piper, PT

## 2025-02-19 ENCOUNTER — RESULTS FOLLOW-UP (OUTPATIENT)
Dept: FAMILY MEDICINE | Facility: CLINIC | Age: 89
End: 2025-02-19
Payer: MEDICARE

## 2025-02-19 ENCOUNTER — TELEPHONE (OUTPATIENT)
Dept: FAMILY MEDICINE | Facility: CLINIC | Age: 89
End: 2025-02-19
Payer: MEDICARE

## 2025-02-19 ENCOUNTER — HOSPITAL ENCOUNTER (OUTPATIENT)
Dept: RADIOLOGY | Facility: HOSPITAL | Age: 89
Discharge: HOME | End: 2025-02-19
Attending: STUDENT IN AN ORGANIZED HEALTH CARE EDUCATION/TRAINING PROGRAM
Payer: MEDICARE

## 2025-02-19 DIAGNOSIS — M85.89 OSTEOPENIA OF MULTIPLE SITES: Primary | ICD-10-CM

## 2025-02-19 DIAGNOSIS — M85.89 OSTEOPENIA OF MULTIPLE SITES: ICD-10-CM

## 2025-02-19 PROCEDURE — 77091 TBS TECHL CALCULATION ONLY: CPT

## 2025-02-19 NOTE — TELEPHONE ENCOUNTER
There is no smaller dose of metoprolol and the extended release cannot be cut in half.  She could stop it for now and let her cardiology team know she stopped it so they can adjust her medication.  Dr. Lopez

## 2025-02-19 NOTE — TELEPHONE ENCOUNTER
Spoke w/ pt  Pt requesting to decrease blood pressure medication Metorolol   BP ranging 110-117/70  Pt c/o weakness.   Pt would like to cut prescription in half. Please advise

## 2025-02-19 NOTE — TELEPHONE ENCOUNTER
----- Message from Azalea sent at 2/19/2025  3:31 PM CST -----  Contact: Patient  Type:  Needs Medical AdviceWho Called:  PatientWould the patient rather a call back or a response via [facility]?   Call Hodan Call Back Number:   [phone] Information:   States she would like to speak with Alisha to see if she can cut back on hermetoprolol succinate (TOPROL-XL) 25 MG 24 hr tablet  - states she feels very weak - please call  - thank you

## 2025-02-20 NOTE — TELEPHONE ENCOUNTER
Spoke w/ pt verified first/last name, .   Informed pt she could stop Metoprolol at this time and contact cardiology. Pt voiced understanding

## 2025-02-24 ENCOUNTER — CLINICAL SUPPORT (OUTPATIENT)
Dept: REHABILITATION | Facility: HOSPITAL | Age: 89
End: 2025-02-24
Payer: MEDICARE

## 2025-02-24 DIAGNOSIS — Z74.09 IMPAIRED FUNCTIONAL MOBILITY AND ACTIVITY TOLERANCE: ICD-10-CM

## 2025-02-24 DIAGNOSIS — G57.82 INTERDIGITAL NEUROMA OF LEFT FOOT: Primary | ICD-10-CM

## 2025-02-24 PROCEDURE — 97530 THERAPEUTIC ACTIVITIES: CPT | Mod: PN

## 2025-02-24 PROCEDURE — 97112 NEUROMUSCULAR REEDUCATION: CPT | Mod: PN

## 2025-02-25 NOTE — PROGRESS NOTES
"  Outpatient Rehab    Physical Therapy Visit    Patient Name: Janelle Posey  MRN: [id number]  YOB: 1935  Encounter Date: 2/24/2025    Therapy Diagnosis:   Encounter Diagnoses   Name Primary?    Interdigital neuroma of left foot Yes    Impaired functional mobility and activity tolerance      Physician: Grupo Hendrix IV, DPM    Physician Orders: Eval and Treat    Medical Diagnosis: R26.9 (ICD-10-CM) - Abnormality of gait    Visit # / Visits Authorized:  1 / 12   Date of Evaluation:  2/17/2025   Insurance Authorization Period: 2/7/25 to 2/7/26  Plan of Care Certification:  2/17/2025 to 5/17/2025      Time In: 1100   Time Out: 1155  Total Time: 55   Total Billable Time: 30 mins - split billing Medicare     FOTO:  Intake Score:  %  Survey Score 1:  %  Survey Score 2:  %         Subjective   My toes are have been tingling - both feet; asked patient about the tennis shoes she is wearing - maybe not allowing enough room for toes; She stated that they fit ok, she just doesn't like wearing tennis shoes..  Pain reported as 0/10.      Objective            Treatment:  Balance/Neuromuscular Re-Education  Balance/Neuromuscular Re-Education Activity 1: Toe-Taps - 6" step with light fingertip for balance x 2 mins  Balance/Neuromuscular Re-Education Activity 2: Posterior pelvic tilt  x 10  with 5" hold  Balance/Neuromuscular Re-Education Activity 3: S/L clams x 10 each  Balance/Neuromuscular Re-Education Activity 4: S/L hip abdcution x 10  Balance/Neuromuscular Re-Education Activity 5: SAQ's over small gray roll - alternate LE's with quad mm activation on extension x 3 mins    Therapeutic Activity  Therapeutic Activity 1: Nu-Step x 15 mins Level 4  Therapeutic Activity 2: 3-way hip with yellow tband x 10 each  Therapeutic Activity 3: Step-ups: 6" step - 12 x's leading with each LE  Therapeutic Activity 4: Quad sets - 5" hold x 1 min  Therapeutic Activity 5: Bridges x 10  Therapeutic Activity 6: SLR x 10 each " leg    Assessment & Plan   Assessment: Janelle demonstrated good tolerance of exercises/activities above without any exacerbation of pain; Mild faituge noted with standing activities; Cueing for improvement in gait pattern - heel to toe movement with improving clearance of forefoot.  Evaluation/Treatment Tolerance: Patient tolerated treatment well    Patient will continue to benefit from skilled outpatient physical therapy to address the deficits listed in the problem list box on initial evaluation, provide pt/family education and to maximize pt's level of independence in the home and community environment.     Patient's spiritual, cultural, and educational needs considered and patient agreeable to plan of care and goals.           Plan: Continue with SKilled physical therapy intervention to address LE weakness and gait/balance deficits as noted in POC. treatment 2x/week    Goals:   Active       LTG's   6 weeks        Demonstrate ability to perform all household activities with no limitation        Start:  02/17/25    Expected End:  04/04/25            Demonstrate ability to perform all work tasks with no limitation.        Start:  02/17/25    Expected End:  04/04/25            Able to ambulate community required distances with no limitation        Start:  02/17/25    Expected End:  04/04/25            Ascend/descend flight of steps with railing with no limitation        Start:  02/17/25    Expected End:  04/04/25            Demonstrate independence with HEP for continued improvement in function        Start:  02/17/25    Expected End:  04/04/25               STG's   3 weeks        Demonstrate improvement in recent symptoms to progress toward long term goals        Start:  02/17/25    Expected End:  03/14/25            Correct postural deficits to reduce any pain and promote improvement in postural awareness for injury prevention        Start:  02/17/25    Expected End:  03/14/25            Demonstrate compliance  with initial exercise program        Start:  02/17/25    Expected End:  03/14/25                Lori Piper PT

## 2025-02-26 ENCOUNTER — CLINICAL SUPPORT (OUTPATIENT)
Dept: REHABILITATION | Facility: HOSPITAL | Age: 89
End: 2025-02-26
Payer: MEDICARE

## 2025-02-26 DIAGNOSIS — G57.82 INTERDIGITAL NEUROMA OF LEFT FOOT: Primary | ICD-10-CM

## 2025-02-26 DIAGNOSIS — Z74.09 IMPAIRED FUNCTIONAL MOBILITY AND ACTIVITY TOLERANCE: ICD-10-CM

## 2025-02-26 PROCEDURE — 97530 THERAPEUTIC ACTIVITIES: CPT | Mod: PN

## 2025-02-26 PROCEDURE — 97112 NEUROMUSCULAR REEDUCATION: CPT | Mod: PN

## 2025-02-26 NOTE — PROGRESS NOTES
"  Outpatient Rehab    Physical Therapy Visit    Patient Name: Janelle Posey  MRN: [id number]  YOB: 1935  Encounter Date: 2/26/2025    Therapy Diagnosis:   Encounter Diagnoses   Name Primary?    Interdigital neuroma of left foot Yes    Impaired functional mobility and activity tolerance      Physician: Grupo Hendrix IV, DPM    Physician Orders: Eval and Treat    Medical Diagnosis: R26.9 (ICD-10-CM) - Abnormality of gait     Visit # / Visits Authorized:  3 / 12   Date of Evaluation:  2/17/2025   Insurance Authorization Period: 2/7/25 to 2/7/26  Plan of Care Certification:  2/17/2025 to 5/17/2025  Time In: 1105   Time Out: 1155  Total Time: 50   Total Billable Time: 30 split time    FOTO:  Intake Score:  %  Survey Score 1:  %  Survey Score 2:  %         Subjective   Blood pressure was low this morning. Did not have as much pain last night as she usually does. Had tingling in both feet but not as bad as when she started..  Pain reported as 1/10.      Objective            Treatment:  Balance/Neuromuscular Re-Education  Balance/Neuromuscular Re-Education Activity 1: Toe-Taps - 6" step with light fingertip for balance x 2 mins  Balance/Neuromuscular Re-Education Activity 2: Posterior pelvic tilt  x 10  with 5" hold  Balance/Neuromuscular Re-Education Activity 3: S/L clams x 10 each  Balance/Neuromuscular Re-Education Activity 4: S/L hip abdcution x 10  Balance/Neuromuscular Re-Education Activity 5: SAQ's over small gray roll - alternate LE's with quad mm activation on extension x 3 mins    Therapeutic Activity  Therapeutic Activity 1: Nu-Step x 15 mins Level 4  Therapeutic Activity 2: 3-way hip with yellow tband x 10 each  Therapeutic Activity 3: Step-ups: 6" step - 12 x's leading with each LE  Therapeutic Activity 4: Quad sets - 5" hold x 1 min  Therapeutic Activity 5: Bridges x 10  Therapeutic Activity 6: SLR x 10 each leg    Assessment & Plan   Assessment: Janelle demonstrated good tolerance of " exercises/activities above without any exacerbation of pain; Mild faituge noted with standing activities; Cueing for improvement in gait pattern - heel to toe movement with improving clearance of forefoot. Pt needs VC to perform the exercises correctly.       Patient will continue to benefit from skilled outpatient physical therapy to address the deficits listed in the problem list box on initial evaluation, provide pt/family education and to maximize pt's level of independence in the home and community environment.     Patient's spiritual, cultural, and educational needs considered and patient agreeable to plan of care and goals.           Plan: Continue with SKilled physical therapy intervention to address LE weakness and gait/balance deficits as noted in POC. treatment 2x/week    Goals:   Active       LTG's   6 weeks        Demonstrate ability to perform all household activities with no limitation  (Progressing)       Start:  02/17/25    Expected End:  04/04/25            Demonstrate ability to perform all work tasks with no limitation.  (Progressing)       Start:  02/17/25    Expected End:  04/04/25            Able to ambulate community required distances with no limitation  (Progressing)       Start:  02/17/25    Expected End:  04/04/25            Ascend/descend flight of steps with railing with no limitation  (Progressing)       Start:  02/17/25    Expected End:  04/04/25            Demonstrate independence with HEP for continued improvement in function  (Progressing)       Start:  02/17/25    Expected End:  04/04/25               STG's   3 weeks        Demonstrate improvement in recent symptoms to progress toward long term goals  (Progressing)       Start:  02/17/25    Expected End:  03/14/25            Correct postural deficits to reduce any pain and promote improvement in postural awareness for injury prevention  (Progressing)       Start:  02/17/25    Expected End:  03/14/25            Demonstrate  compliance with initial exercise program  (Progressing)       Start:  02/17/25    Expected End:  03/14/25                Tiana Abbasi, PT

## 2025-03-03 ENCOUNTER — CLINICAL SUPPORT (OUTPATIENT)
Dept: REHABILITATION | Facility: HOSPITAL | Age: 89
End: 2025-03-03
Payer: MEDICARE

## 2025-03-03 DIAGNOSIS — G57.82 INTERDIGITAL NEUROMA OF LEFT FOOT: Primary | ICD-10-CM

## 2025-03-03 DIAGNOSIS — Z74.09 IMPAIRED FUNCTIONAL MOBILITY AND ACTIVITY TOLERANCE: ICD-10-CM

## 2025-03-03 PROCEDURE — 97112 NEUROMUSCULAR REEDUCATION: CPT | Mod: PN

## 2025-03-03 PROCEDURE — 97530 THERAPEUTIC ACTIVITIES: CPT | Mod: PN

## 2025-03-04 NOTE — PROGRESS NOTES
"  Outpatient Rehab    Physical Therapy Visit    Patient Name: Janelle Posey  MRN: [id number]  YOB: 1935  Encounter Date: 3/3/2025    Therapy Diagnosis:   Encounter Diagnoses   Name Primary?    Interdigital neuroma of left foot Yes    Impaired functional mobility and activity tolerance      Physician: Grupo Hendrix IV, DPM    Physician Orders: Eval and Treat  Medical Diagnosis: R26.9 (ICD-10-CM) - Abnormality of gait    Visit # / Visits Authorized:  3/12  Date of Evaluation:  2/17/2025   Insurance Authorization Period: 2/7/25 to 2/7/26  Plan of Care Certification:  2/17/2025 to 5/17/2025        Time In: 1100   Time Out: 1200  Total Time: 60   Total Billable Time: 30 mins - split billing Medicare     FOTO:  Intake Score:  %  Survey Score 1:  %  Survey Score 2:  %         Subjective   Patient with no c/o's this morning..  Pain reported as 0/10.      Objective      Treatment:      Balance/Neuromuscular Re-Education  Balance/Neuromuscular Re-Education Activity 1: Toe-Taps - 6" step with light fingertip for balance x 2 mins  Balance/Neuromuscular Re-Education Activity 2: Posterior pelvic tilt  x 10  with 5" hold  Balance/Neuromuscular Re-Education Activity 3: S/L clams x 10 each  Balance/Neuromuscular Re-Education Activity 4: S/L hip abdcution x 10  Balance/Neuromuscular Re-Education Activity 5: SAQ's over small gray roll - alternate LE's with quad mm activation on extension x 3 mins    Therapeutic Activity  Therapeutic Activity 1: Nu-Step x 15 mins Level 4  Therapeutic Activity 2: 3-way hip with yellow tband x 15 each  Therapeutic Activity 3: Step-ups: 6" step - 12 x's leading with each LE  Therapeutic Activity 4: Quad sets - 5" hold x 1 min  Therapeutic Activity 5: Bridges x 15  Therapeutic Activity 6: SLR x 15 each leg  Therapeutic Activity 7: Mini squats x 10  Therapeutic Activity 8: Ball Squeezes x 2 mins 5"H      Assessment & Plan   Assessment: Janelle is demonstrating improved tolerance of " exercises/activities; Aware that LLE is not as strong as her right since her knee surgery; NO falls since starting therapy, No increaed pain or other symptoms  Evaluation/Treatment Tolerance: Patient tolerated treatment well    Patient will continue to benefit from skilled outpatient physical therapy to address the deficits listed in the problem list box on initial evaluation, provide pt/family education and to maximize pt's level of independence in the home and community environment.     Patient's spiritual, cultural, and educational needs considered and patient agreeable to plan of care and goals.           Plan: Continue with SKilled physical therapy intervention to address LE weakness and gait/balance deficits as noted in POC. treatment 2x/week    Goals:   Active       LTG's   6 weeks        Demonstrate ability to perform all household activities with no limitation  (Progressing)       Start:  02/17/25    Expected End:  04/04/25            Demonstrate ability to perform all work tasks with no limitation.  (Progressing)       Start:  02/17/25    Expected End:  04/04/25            Able to ambulate community required distances with no limitation  (Progressing)       Start:  02/17/25    Expected End:  04/04/25            Ascend/descend flight of steps with railing with no limitation  (Progressing)       Start:  02/17/25    Expected End:  04/04/25            Demonstrate independence with HEP for continued improvement in function  (Progressing)       Start:  02/17/25    Expected End:  04/04/25               STG's   3 weeks        Demonstrate improvement in recent symptoms to progress toward long term goals  (Progressing)       Start:  02/17/25    Expected End:  03/14/25            Correct postural deficits to reduce any pain and promote improvement in postural awareness for injury prevention  (Progressing)       Start:  02/17/25    Expected End:  03/14/25            Demonstrate compliance with initial exercise program   (Met)       Start:  02/17/25    Expected End:  03/14/25    Resolved:  03/04/25             Lori Piper PT

## 2025-03-06 ENCOUNTER — CLINICAL SUPPORT (OUTPATIENT)
Dept: REHABILITATION | Facility: HOSPITAL | Age: 89
End: 2025-03-06
Payer: MEDICARE

## 2025-03-06 DIAGNOSIS — G57.82 INTERDIGITAL NEUROMA OF LEFT FOOT: Primary | ICD-10-CM

## 2025-03-06 DIAGNOSIS — Z74.09 IMPAIRED FUNCTIONAL MOBILITY AND ACTIVITY TOLERANCE: ICD-10-CM

## 2025-03-06 PROCEDURE — 97530 THERAPEUTIC ACTIVITIES: CPT | Mod: PN,CQ

## 2025-03-06 PROCEDURE — 97112 NEUROMUSCULAR REEDUCATION: CPT | Mod: PN,CQ

## 2025-03-06 NOTE — PROGRESS NOTES
"  Outpatient Rehab    Physical Therapy Visit    Patient Name: Janelle Posey  MRN: [id number]  YOB: 1935  Encounter Date: 3/6/2025    Therapy Diagnosis:   Encounter Diagnoses   Name Primary?    Interdigital neuroma of left foot Yes    Impaired functional mobility and activity tolerance      Physician: Grupo Hendrix IV, DPM    Physician Orders: Eval and Treat    Medical Diagnosis: R26.9 (ICD-10-CM) - Abnormality of gait     Visit # / Visits Authorized:  4/12  Date of Evaluation:  2/17/2025   Insurance Authorization Period: 2/7/25 to 12/31/25  Plan of Care Certification:  2/17/2025 to 5/17/2025        Time In:   11:00 AM  Time Out:  12:00 PM  Total Time:   60 Minutes  Total Billable Time: 53 Minutes    FOTO:  Intake Score:  %  Survey Score 1:  %  Survey Score 2:  %         Subjective   No new c/o's.  Pain reported as 0/10.      Objective            Treatment:  23 Minutes  Balance/Neuromuscular Re-Education  Balance/Neuromuscular Re-Education Activity 1: Toe-Taps - 6" step with light fingertip for balance x 2 mins  Balance/Neuromuscular Re-Education Activity 2: Posterior pelvic tilt  x 10  with 5" hold  Balance/Neuromuscular Re-Education Activity 3: S/L clams x 15 each  Balance/Neuromuscular Re-Education Activity 4: S/L hip abduction x 10  Balance/Neuromuscular Re-Education Activity 5: SAQ's over small gray roll - alternate LE's with quad mm activation on extension x 3 mins    30 Minutes  Therapeutic Activity  Therapeutic Activity 1: Nu-Step x 15 mins Level 4  Therapeutic Activity 2: 3-way hip with yellow tband x 15 each  Therapeutic Activity 3: Step-ups: 6" step - 12 x's leading with each LE  Therapeutic Activity 4: Quad sets - 5" hold x 2 min  Therapeutic Activity 5: Bridges x 15  Therapeutic Activity 6: SLR x 15 each leg  Therapeutic Activity 7: Mini squats x 10  Therapeutic Activity 8: Ball Squeezes x 2 mins 5"H    Assessment & Plan   Assessment: Patient did well with exercises; no exacerbations.   "     Patient will continue to benefit from skilled outpatient physical therapy to address the deficits listed in the problem list box on initial evaluation, provide pt/family education and to maximize pt's level of independence in the home and community environment.     Patient's spiritual, cultural, and educational needs considered and patient agreeable to plan of care and goals.           Plan: Continue with Skilled physical therapy intervention to address LE weakness and gait/balance deficits as noted in POC. treatment 2x/week    Goals:   Active       LTG's   6 weeks        Demonstrate ability to perform all household activities with no limitation  (Progressing)       Start:  02/17/25    Expected End:  04/04/25            Demonstrate ability to perform all work tasks with no limitation.  (Progressing)       Start:  02/17/25    Expected End:  04/04/25            Able to ambulate community required distances with no limitation  (Progressing)       Start:  02/17/25    Expected End:  04/04/25            Ascend/descend flight of steps with railing with no limitation  (Progressing)       Start:  02/17/25    Expected End:  04/04/25            Demonstrate independence with HEP for continued improvement in function  (Progressing)       Start:  02/17/25    Expected End:  04/04/25               STG's   3 weeks        Demonstrate improvement in recent symptoms to progress toward long term goals  (Progressing)       Start:  02/17/25    Expected End:  03/14/25            Correct postural deficits to reduce any pain and promote improvement in postural awareness for injury prevention  (Progressing)       Start:  02/17/25    Expected End:  03/14/25            Demonstrate compliance with initial exercise program  (Met)       Start:  02/17/25    Expected End:  03/14/25    Resolved:  03/04/25             Jonathan Favre, PTA

## 2025-03-10 ENCOUNTER — CLINICAL SUPPORT (OUTPATIENT)
Dept: REHABILITATION | Facility: HOSPITAL | Age: 89
End: 2025-03-10
Payer: MEDICARE

## 2025-03-10 DIAGNOSIS — Z74.09 IMPAIRED FUNCTIONAL MOBILITY AND ACTIVITY TOLERANCE: ICD-10-CM

## 2025-03-10 DIAGNOSIS — G57.82 INTERDIGITAL NEUROMA OF LEFT FOOT: Primary | ICD-10-CM

## 2025-03-10 PROCEDURE — 97112 NEUROMUSCULAR REEDUCATION: CPT | Mod: PN

## 2025-03-10 PROCEDURE — 97530 THERAPEUTIC ACTIVITIES: CPT | Mod: PN

## 2025-03-10 NOTE — PROGRESS NOTES
"  Outpatient Rehab    Physical Therapy Visit    Patient Name: Janelle Posey  MRN: [id number]  YOB: 1935  Encounter Date: 3/10/2025    Therapy Diagnosis:   Encounter Diagnoses   Name Primary?    Interdigital neuroma of left foot Yes    Impaired functional mobility and activity tolerance      Physician: Grupo Hendrix IV, DPM    Physician Orders: Eval and Treat  Medical Diagnosis: R26.9 (ICD-10-CM) - Abnormality of gait    Visit # / Visits Authorized:  5/12   Date of Evaluation:  2/17/2025   Insurance Authorization Period: 2/7/25 to 2/7/26  Plan of Care Certification:  2/17/2025 to 5/17/2025        Time In: 1100   Time Out: 1200  Total Time: 60   Total Billable Time: 30 mins - split billing     FOTO:  Intake Score:  %  Survey Score 1:  %  Survey Score 2:  %         Subjective   This therapy has really helped me, I'm so glad that I'm coming.  I feel like my balance is so much better..  Pain reported as 0/10.      Objective            Treatment:            Balance/Neuromuscular Re-Education  Balance/Neuromuscular Re-Education Activity 1: Toe-Taps - 6" step with light fingertip for balance x 2 mins  Balance/Neuromuscular Re-Education Activity 2: Posterior pelvic tilt  x 10  with 5" hold  Balance/Neuromuscular Re-Education Activity 3: S/L clams x 20  each  Balance/Neuromuscular Re-Education Activity 4: S/L hip abduction x 10  Balance/Neuromuscular Re-Education Activity 5: SAQ's over small gray roll - alternate LE's with quad mm activation on extension x 3 mins    Therapeutic Activity  Therapeutic Activity 1: Nu-Step x 15 mins Level 4  Therapeutic Activity 2: 3-way hip with yellow tband x 15 each  Therapeutic Activity 3: Step-ups: 6" step - 12 x's leading with each LE  Therapeutic Activity 4: Quad sets - 5" hold x 2 min  Therapeutic Activity 5: Bridges x 15  Therapeutic Activity 6: SLR x 15 each leg  Therapeutic Activity 7: Mini squats x 10  Therapeutic Activity 8: Ball Squeezes x 2 mins 5"H          "     Assessment & Plan   Assessment: Janelle did very well with exercises today;  No increased symptoms noted.  Evaluation/Treatment Tolerance: Patient tolerated treatment well    Patient will continue to benefit from skilled outpatient physical therapy to address the deficits listed in the problem list box on initial evaluation, provide pt/family education and to maximize pt's level of independence in the home and community environment.     Patient's spiritual, cultural, and educational needs considered and patient agreeable to plan of care and goals.           Plan: Continue with Skilled physical therapy intervention to address LE weakness and gait/balance deficits as noted in POC. treatment 2x/week    Goals:   Active       LTG's   6 weeks        Demonstrate ability to perform all household activities with no limitation  (Progressing)       Start:  02/17/25    Expected End:  04/04/25            Demonstrate ability to perform all work tasks with no limitation.  (Progressing)       Start:  02/17/25    Expected End:  04/04/25            Able to ambulate community required distances with no limitation  (Progressing)       Start:  02/17/25    Expected End:  04/04/25            Ascend/descend flight of steps with railing with no limitation  (Progressing)       Start:  02/17/25    Expected End:  04/04/25            Demonstrate independence with HEP for continued improvement in function  (Progressing)       Start:  02/17/25    Expected End:  04/04/25               STG's   3 weeks        Demonstrate improvement in recent symptoms to progress toward long term goals  (Progressing)       Start:  02/17/25    Expected End:  03/14/25            Correct postural deficits to reduce any pain and promote improvement in postural awareness for injury prevention  (Progressing)       Start:  02/17/25    Expected End:  03/14/25            Demonstrate compliance with initial exercise program  (Met)       Start:  02/17/25    Expected End:   03/14/25    Resolved:  03/04/25             Lori Piper, PT

## 2025-03-12 ENCOUNTER — CLINICAL SUPPORT (OUTPATIENT)
Dept: REHABILITATION | Facility: HOSPITAL | Age: 89
End: 2025-03-12
Payer: MEDICARE

## 2025-03-12 DIAGNOSIS — Z74.09 IMPAIRED FUNCTIONAL MOBILITY AND ACTIVITY TOLERANCE: ICD-10-CM

## 2025-03-12 DIAGNOSIS — G57.82 INTERDIGITAL NEUROMA OF LEFT FOOT: Primary | ICD-10-CM

## 2025-03-12 PROCEDURE — 97530 THERAPEUTIC ACTIVITIES: CPT | Mod: PN,CQ

## 2025-03-12 PROCEDURE — 97112 NEUROMUSCULAR REEDUCATION: CPT | Mod: PN,CQ

## 2025-03-12 PROCEDURE — 97150 GROUP THERAPEUTIC PROCEDURES: CPT | Mod: PN,CQ

## 2025-03-12 NOTE — PROGRESS NOTES
"  Outpatient Rehab    Physical Therapy Visit    Patient Name: Janelle Posey  MRN: [id number]  YOB: 1935  Encounter Date: 3/12/2025    Therapy Diagnosis:   Encounter Diagnoses   Name Primary?    Interdigital neuroma of left foot Yes    Impaired functional mobility and activity tolerance      Physician: Grupo Hendrix IV, DPM    Physician Orders: Eval and Treat    Medical Diagnosis: R26.9 (ICD-10-CM) - Abnormality of gait     Visit # / Visits Authorized:  6/12  Date of Evaluation:  2/17/2025   Insurance Authorization Period: 2/7/25 to 2/7/26  Plan of Care Certification:  2/17/2025 to 5/17/2025        Time In:   10:55 AM  Time Out:  11:55 AM  Total Time:   60 Minutes  Total Billable Time: 45 Minutes split billing    FOTO:  Intake Score:  %  Survey Score 1:  %  Survey Score 2:  %         Subjective   This has helped me.  Pain reported as 0/10.      Objective            Treatment:  23 Minutes  Balance/Neuromuscular Re-Education  Balance/Neuromuscular Re-Education Activity 1: Toe-Taps - 6" step with light fingertip for balance x 2 mins  Balance/Neuromuscular Re-Education Activity 2: Posterior pelvic tilt  x 15  with 5" hold  Balance/Neuromuscular Re-Education Activity 3: S/L clams x 20  each  Balance/Neuromuscular Re-Education Activity 4: S/L hip abduction x 10  Balance/Neuromuscular Re-Education Activity 5: SAQ's over small gray roll - alternate LE's with quad mm activation on extension x 3 mins  Balance/Neuromuscular Re-Education Activity 6: Heel Cord stretch on wedge 3 x 30 sec    30 Minutes  Therapeutic Activity  Therapeutic Activity 1: Nu-Step x 15 mins Level 4  Therapeutic Activity 2: 3-way hip with yellow tband x 15 each  Therapeutic Activity 3: Step-ups: 6" stepx 15 leading with each LE  Therapeutic Activity 4: Quad sets - 5" hold x 2 min  Therapeutic Activity 5: Bridges x 15  Therapeutic Activity 6: SLR 2 x 10 each leg  Therapeutic Activity 7: Mini squats x 10  Therapeutic Activity 8: Ball Squeezes x 2 " "mins 5"H    Assessment & Plan   Assessment: Janelle did very well with exercises today;  No increased symptoms noted.       Patient will continue to benefit from skilled outpatient physical therapy to address the deficits listed in the problem list box on initial evaluation, provide pt/family education and to maximize pt's level of independence in the home and community environment.     Patient's spiritual, cultural, and educational needs considered and patient agreeable to plan of care and goals.           Plan: Continue with Skilled physical therapy intervention to address LE weakness and gait/balance deficits as noted in POC. treatment 2x/week    Goals:   Active       LTG's   6 weeks        Demonstrate ability to perform all household activities with no limitation  (Progressing)       Start:  02/17/25    Expected End:  04/04/25            Demonstrate ability to perform all work tasks with no limitation.  (Progressing)       Start:  02/17/25    Expected End:  04/04/25            Able to ambulate community required distances with no limitation  (Progressing)       Start:  02/17/25    Expected End:  04/04/25            Ascend/descend flight of steps with railing with no limitation  (Progressing)       Start:  02/17/25    Expected End:  04/04/25            Demonstrate independence with HEP for continued improvement in function  (Progressing)       Start:  02/17/25    Expected End:  04/04/25               STG's   3 weeks        Demonstrate improvement in recent symptoms to progress toward long term goals  (Progressing)       Start:  02/17/25    Expected End:  03/14/25            Correct postural deficits to reduce any pain and promote improvement in postural awareness for injury prevention  (Progressing)       Start:  02/17/25    Expected End:  03/14/25            Demonstrate compliance with initial exercise program  (Met)       Start:  02/17/25    Expected End:  03/14/25    Resolved:  03/04/25             Chris" Favre, PTA

## 2025-03-17 ENCOUNTER — CLINICAL SUPPORT (OUTPATIENT)
Dept: REHABILITATION | Facility: HOSPITAL | Age: 89
End: 2025-03-17
Payer: MEDICARE

## 2025-03-17 DIAGNOSIS — Z74.09 IMPAIRED FUNCTIONAL MOBILITY AND ACTIVITY TOLERANCE: ICD-10-CM

## 2025-03-17 DIAGNOSIS — G57.82 INTERDIGITAL NEUROMA OF LEFT FOOT: Primary | ICD-10-CM

## 2025-03-17 PROCEDURE — 97112 NEUROMUSCULAR REEDUCATION: CPT | Mod: PN,CQ

## 2025-03-17 PROCEDURE — 97530 THERAPEUTIC ACTIVITIES: CPT | Mod: PN,CQ

## 2025-03-17 NOTE — PROGRESS NOTES
"  Outpatient Rehab    Physical Therapy Visit    Patient Name: Janelle Posey  MRN: [id number]  YOB: 1935  Encounter Date: 3/17/2025    Therapy Diagnosis:   Encounter Diagnoses   Name Primary?    Interdigital neuroma of left foot Yes    Impaired functional mobility and activity tolerance      Physician: Grupo Hendrix IV, DPM    Physician Orders: Eval and Treat  Medical Diagnosis: Abnormality of gait    Visit # / Visits Authorized:  7 / 10  Date of Evaluation: 2/17/2025  Insurance Authorization Period: 2/18/2025 to 12/31/2025  Plan of Care Certification:  2/17/25 to 5/17/25      PT/PTA:     Number of PTA visits since last PT visit:  2  Time In:   10:50 AM  Time Out:  11:45 AM  Total Time:   55 Minutes  Total Billable Time: 53 Minutes    FOTO:  Intake Score:  %  Survey Score 1:  %  Survey Score 2:  %         Subjective   My foot was bothering me a little yesterday, but it is fine today..  Pain reported as 0/10.      Objective            Treatment:  Balance/Neuromuscular Re-Education  NMR 1: Toe-Taps - 6" step with light fingertip for balance x 2 mins  NMR 2: Posterior pelvic tilt  x 15  with 5" hold  NMR 3: S/L clams x 20  each  NMR 4: S/L hip abduction x 10  NMR 5: SAQ's over small gray roll - alternate LE's with quad mm activation on extension x 3 mins  NMR 6: Heel Cord stretch on wedge 3 x 30 sec  Therapeutic Activity  TA 1: Nu-Step x 15 mins Level 4  TA 2: 3-way hip with yellow tband x 15 each  TA 3: Step-ups: 6" stepx 15 leading with each LE  TA 4: Quad sets - 5" hold x 2 min  TA 5: Bridges x 15  TA 6: SLR 2 x 10 each leg  TA 7: Mini squats x 10  TA 8: Ball Squeezes x 2 mins 5"H    Time Entry(in minutes): 53       Assessment & Plan   Assessment: Janelle did very well with exercises today;  No increased symptoms noted.       Patient will continue to benefit from skilled outpatient physical therapy to address the deficits listed in the problem list box on initial evaluation, provide pt/family education and " to maximize pt's level of independence in the home and community environment.     Patient's spiritual, cultural, and educational needs considered and patient agreeable to plan of care and goals.           Plan: Continue with Skilled physical therapy intervention to address LE weakness and gait/balance deficits as noted in POC. treatment 2x/week    Goals:   Active       LTG's   6 weeks        Demonstrate ability to perform all household activities with no limitation  (Progressing)       Start:  02/17/25    Expected End:  04/04/25            Demonstrate ability to perform all work tasks with no limitation.  (Progressing)       Start:  02/17/25    Expected End:  04/04/25            Able to ambulate community required distances with no limitation  (Progressing)       Start:  02/17/25    Expected End:  04/04/25            Ascend/descend flight of steps with railing with no limitation  (Progressing)       Start:  02/17/25    Expected End:  04/04/25            Demonstrate independence with HEP for continued improvement in function  (Progressing)       Start:  02/17/25    Expected End:  04/04/25               STG's   3 weeks        Demonstrate improvement in recent symptoms to progress toward long term goals  (Progressing)       Start:  02/17/25    Expected End:  03/14/25            Correct postural deficits to reduce any pain and promote improvement in postural awareness for injury prevention  (Progressing)       Start:  02/17/25    Expected End:  03/14/25            Demonstrate compliance with initial exercise program  (Met)       Start:  02/17/25    Expected End:  03/14/25    Resolved:  03/04/25             Jonathan Favre, PTA

## 2025-03-19 ENCOUNTER — CLINICAL SUPPORT (OUTPATIENT)
Dept: REHABILITATION | Facility: HOSPITAL | Age: 89
End: 2025-03-19
Payer: MEDICARE

## 2025-03-19 DIAGNOSIS — Z74.09 IMPAIRED FUNCTIONAL MOBILITY AND ACTIVITY TOLERANCE: ICD-10-CM

## 2025-03-19 DIAGNOSIS — G57.82 INTERDIGITAL NEUROMA OF LEFT FOOT: Primary | ICD-10-CM

## 2025-03-19 PROCEDURE — 97112 NEUROMUSCULAR REEDUCATION: CPT | Mod: PN,CQ

## 2025-03-19 PROCEDURE — 97530 THERAPEUTIC ACTIVITIES: CPT | Mod: PN,CQ

## 2025-03-19 NOTE — PROGRESS NOTES
"  Outpatient Rehab    Physical Therapy Visit    Patient Name: Janelle Posey  MRN: [id number]  YOB: 1935  Encounter Date: 3/19/2025    Therapy Diagnosis:   Encounter Diagnoses   Name Primary?    Interdigital neuroma of left foot Yes    Impaired functional mobility and activity tolerance      Physician: Grupo Hendrix IV, DPM    Physician Orders: Eval and Treat  Medical Diagnosis: Abnormality of gait    Visit # / Visits Authorized:  8 / 10  Date of Evaluation: 2/17/2025  Insurance Authorization Period: 2/18/2025 to 12/31/2025  Plan of Care Certification:  2/17/2025 to 5/17/2025      PT/PTA:     Number of PTA visits since last PT visit:  3  Time In:   10:55 AM  Time Out:  11:40 AM  Total Time:   45 Minutes  Total Billable Time: 40 Minutes- shortened session secondary to patient needing to go to work    FOTO:  Intake Score:  %  Survey Score 1:  %  Survey Score 2:  %         Subjective   No new c/o's.  Pain reported as 0/10.      Objective            Treatment:  Balance/Neuromuscular Re-Education  NMR 1: Toe-Taps - 6" step with light fingertip for balance x 2 mins  NMR 2: Posterior pelvic tilt  x 15  with 5" hold  NMR 3: S/L clams x 20  each  NMR 4: S/L hip abduction x 10  NMR 5: SAQ's over small gray roll - alternate LE's with quad mm activation on extension x 3 mins  NMR 6: Heel Cord stretch on wedge 3 x 30 sec  Therapeutic Activity  TA 1: Nu-Step x 15 mins Level 4  TA 2: 3-way hip with yellow tband x 15 each  TA 3: Step-ups: 6" stepx 15 leading with each LE  TA 4: Quad sets - 5" hold x 2 min  TA 5: Bridges x 15  TA 6: SLR 2 x 10 each leg  TA 7: Mini squats x 10  TA 8: Ball Squeezes x 2 mins 5"H    Time Entry(in minutes):       Assessment & Plan   Assessment: Janelle did very well with exercises; no exacerbations noted       Patient will continue to benefit from skilled outpatient physical therapy to address the deficits listed in the problem list box on initial evaluation, provide pt/family education and to " maximize pt's level of independence in the home and community environment.     Patient's spiritual, cultural, and educational needs considered and patient agreeable to plan of care and goals.           Plan: Continue with Skilled physical therapy intervention to address LE weakness and gait/balance deficits as noted in POC. treatment 2x/week    Goals:   Active       LTG's   6 weeks        Demonstrate ability to perform all household activities with no limitation  (Progressing)       Start:  02/17/25    Expected End:  04/04/25            Demonstrate ability to perform all work tasks with no limitation.  (Progressing)       Start:  02/17/25    Expected End:  04/04/25            Able to ambulate community required distances with no limitation  (Progressing)       Start:  02/17/25    Expected End:  04/04/25            Ascend/descend flight of steps with railing with no limitation  (Progressing)       Start:  02/17/25    Expected End:  04/04/25            Demonstrate independence with HEP for continued improvement in function  (Progressing)       Start:  02/17/25    Expected End:  04/04/25               STG's   3 weeks        Demonstrate improvement in recent symptoms to progress toward long term goals  (Progressing)       Start:  02/17/25    Expected End:  03/14/25            Correct postural deficits to reduce any pain and promote improvement in postural awareness for injury prevention  (Progressing)       Start:  02/17/25    Expected End:  03/14/25            Demonstrate compliance with initial exercise program  (Met)       Start:  02/17/25    Expected End:  03/14/25    Resolved:  03/04/25             Jonathan Favre, PTA

## 2025-03-24 NOTE — DISCHARGE INSTRUCTIONS
Take medications as prescribed.  Follow-up with primary care physician at the beginning of this week for close follow-up.  Return to the emergency room for any fever, not tolerating fluids, not able to keep medication down, any vomiting, back pain, or any worsening symptoms or concerns at all.   Chart reviewed. Pt. currently without activity orders. PT evaluation on hold pending activity orders as appropriate. Will follow.

## 2025-03-31 ENCOUNTER — CLINICAL SUPPORT (OUTPATIENT)
Dept: REHABILITATION | Facility: HOSPITAL | Age: 89
End: 2025-03-31
Payer: MEDICARE

## 2025-03-31 DIAGNOSIS — G57.82 INTERDIGITAL NEUROMA OF LEFT FOOT: Primary | ICD-10-CM

## 2025-03-31 DIAGNOSIS — Z74.09 IMPAIRED FUNCTIONAL MOBILITY AND ACTIVITY TOLERANCE: ICD-10-CM

## 2025-03-31 PROCEDURE — 97112 NEUROMUSCULAR REEDUCATION: CPT | Mod: PN

## 2025-03-31 PROCEDURE — 97530 THERAPEUTIC ACTIVITIES: CPT | Mod: PN

## 2025-03-31 NOTE — PROGRESS NOTES
"  Outpatient Rehab    Physical Therapy Visit    Patient Name: Janelle Posey  MRN: [id number]  YOB: 1935  Encounter Date: 3/31/2025    Therapy Diagnosis:   Encounter Diagnoses   Name Primary?    Interdigital neuroma of left foot Yes    Impaired functional mobility and activity tolerance      Physician: Grupo Hendrix IV, DPM    Physician Orders: Eval and Treat  Medical Diagnosis: Abnormality of gait    Visit # / Visits Authorized:  9 / 20  Insurance Authorization Period: 2/18/2025 to 12/31/2025  Date of Evaluation: 2/17/25  Plan of Care Certification: 2/17/25 to 5/17/25     PT/PTA:     Number of PTA visits since last PT visit: 10  Time In: 1050   Time Out: 1143  Total Time: 53   Total Billable Time:  53    FOTO:  Intake Score:  %  Survey Score 1:  %  Survey Score 2:  %         Subjective   She says her left foot still bothers her at times.She wore some dress shoes yesterday and that made her foot hurt/swell. She can only wear tennis shoes comfortably..         Objective            Treatment:  Balance/Neuromuscular Re-Education  NMR 1: Toe-Taps - 6" step with light fingertip for balance x 2 mins  NMR 2: Posterior pelvic tilt  x 15  with 5" hold  NMR 3: S/L clams x 20  each  NMR 4: S/L hip abduction x 10  NMR 5: SAQ's over tan roll - alternate LE's with quad mm activation on extension x 3 mins  NMR 6: Heel Cord stretch on wedge 3 x 30 sec  Therapeutic Activity  TA 1: Nu-Step x 15 mins Level 4  TA 2: 3-way hip with yellow tband x 15 each  TA 3: Step-ups: 6" stepx 15 leading with each LE  TA 4: Quad sets - 5" hold x 2 min  TA 5: Bridges x 15  TA 6: SLR 2 x 10 each leg  TA 7: Mini squats x 15  TA 8: Ball Squeezes x 2 mins 5"H    Time Entry(in minutes):  Neuromuscular Re-Education Time Entry: 25  Therapeutic Activity Time Entry: 28    Assessment & Plan   Assessment: Jnaelle did very well with exercises; no exacerbations noted. Pt needs VC to perform the exercises correctly.       Patient will continue to benefit " from skilled outpatient physical therapy to address the deficits listed in the problem list box on initial evaluation, provide pt/family education and to maximize pt's level of independence in the home and community environment.     Patient's spiritual, cultural, and educational needs considered and patient agreeable to plan of care and goals.           Plan: Continue with Skilled physical therapy intervention to address LE weakness and gait/balance deficits as noted in POC. treatment 2x/week    Goals:   Active       LTG's   6 weeks        Demonstrate ability to perform all household activities with no limitation  (Progressing)       Start:  02/17/25    Expected End:  04/04/25            Demonstrate ability to perform all work tasks with no limitation.  (Progressing)       Start:  02/17/25    Expected End:  04/04/25            Able to ambulate community required distances with no limitation  (Progressing)       Start:  02/17/25    Expected End:  04/04/25            Ascend/descend flight of steps with railing with no limitation  (Progressing)       Start:  02/17/25    Expected End:  04/04/25            Demonstrate independence with HEP for continued improvement in function  (Progressing)       Start:  02/17/25    Expected End:  04/04/25               STG's   3 weeks        Demonstrate improvement in recent symptoms to progress toward long term goals  (Progressing)       Start:  02/17/25    Expected End:  03/14/25            Correct postural deficits to reduce any pain and promote improvement in postural awareness for injury prevention  (Progressing)       Start:  02/17/25    Expected End:  03/14/25            Demonstrate compliance with initial exercise program  (Met)       Start:  02/17/25    Expected End:  03/14/25    Resolved:  03/04/25             Tiana Abbasi, PT

## 2025-04-01 ENCOUNTER — TELEPHONE (OUTPATIENT)
Dept: FAMILY MEDICINE | Facility: CLINIC | Age: 89
End: 2025-04-01
Payer: MEDICARE

## 2025-04-01 NOTE — LETTER
April 1, 2025      [facility] [id number]  [place], [place] [address]  Phone- [phone]  Fax- [phone]         Patient: Selene Posey   YOB: 1935  Date of Visit: 04/01/2025  CASE ID # [id number]     To Whom It May Concern:    Karen Posey  is currently under my care.  I have no diagnostic evidence or known documentation of congestive heart failure or chronic kidney disease. If you have any questions or concerns, or if I can be of further assistance, please do not hesitate to contact me.    Sincerely,      Alisha Lopez MD

## 2025-04-01 NOTE — TELEPHONE ENCOUNTER
----- Message from Sherlyn sent at 4/1/2025  9:16 AM CDT -----  Type:  Needs Medical AdviceWho Called: pt Would the patient rather a call back or a response via [facility]? Call back Best Call Back Number: [phone] Additional Information: pt says insurance company says that tony is treating pt for chronic kidney disease and heart failure pt says that's incorrect.     Please call back to advise. Thank you.

## 2025-04-01 NOTE — TELEPHONE ENCOUNTER
"Spoke w/ pt   Pt was contacted by Medicare informing her paperwork was received from office indicating PCP treating pt for CKD and CHF. Pt states, " I have never been told of this. They told me stuff from [facility] was also sent but I have never been seen at [facility]. " Insurance requested letter w/   CASE ID # [id number]  Attached to top of letter from PCP informing insurance Dr. Lopez is not treating pt for CKD and CHF.     Fax # [phone]- attn underwriting appeal  CKD, CHF    Diagnosis reviewed, CKD, CHF not noted.   Please advise  "

## 2025-04-01 NOTE — TELEPHONE ENCOUNTER
"Spoke w/ pt   Pt was contacted by Medicare informing her paperwork was received from office indicating PCP treating pt for CKD and CHF. Pt states, " I have never been told of this. They told me stuff from [facility] was also sent but I have never been seen at [facility]. " Insurance requested letter w/   CASE ID # [id number]  Attached to top of letter from PCP informing insurance Dr. Lopez is not treating pt for CKD and CHF within the last two years      Fax # [phone]- attn underwriting appeal  CKD, CHF     Diagnosis reviewed, CKD, CHF not noted.   Please review and advise.   "

## 2025-04-01 NOTE — TELEPHONE ENCOUNTER
Letter faxed  Spoke with patient and informed her we faxed the letter she thanked us  Shayla Hadley MA 04/01/2025 1:43 PM           Undermining Type: Entire Wound

## 2025-04-01 NOTE — TELEPHONE ENCOUNTER
----- Message from Yahaira sent at 4/1/2025  9:31 AM CDT -----  Type:  Needs Medical Advice Who Called: Pt  Symptoms (please be specific): NA How long has patient had these symptoms:  NA Pharmacy name and phone #:  [id number] - [place], [place] - [address] [address]Wkzco: [phone] Fax: [id number] - [place], [place] - [address] [place] [address]Vbupm: [phone] Fax: [phone] Would the patient rather a call back or a response via [facility]? Call Back Best Call Back Number: [phone] Additional Information: Pt said she had something else she wanted to discuss with nurse      Please call Back to advise. Thanks!

## 2025-04-08 ENCOUNTER — CLINICAL SUPPORT (OUTPATIENT)
Dept: REHABILITATION | Facility: HOSPITAL | Age: 89
End: 2025-04-08
Payer: MEDICARE

## 2025-04-08 DIAGNOSIS — Z74.09 IMPAIRED FUNCTIONAL MOBILITY AND ACTIVITY TOLERANCE: ICD-10-CM

## 2025-04-08 DIAGNOSIS — G57.82 INTERDIGITAL NEUROMA OF LEFT FOOT: Primary | ICD-10-CM

## 2025-04-08 PROCEDURE — 97530 THERAPEUTIC ACTIVITIES: CPT | Mod: PN

## 2025-04-08 PROCEDURE — 97112 NEUROMUSCULAR REEDUCATION: CPT | Mod: PN

## 2025-04-08 NOTE — PROGRESS NOTES
"  Outpatient Rehab    Physical Therapy Visit    Patient Name: Janelle Posey  MRN: [id number]  YOB: 1935  Encounter Date: 4/8/2025    Therapy Diagnosis:   Encounter Diagnoses   Name Primary?    Interdigital neuroma of left foot Yes    Impaired functional mobility and activity tolerance      Physician: Grupo Hendrix IV, DPM    Physician Orders: Eval and Treat  Medical Diagnosis: Abnormality of gait    Visit # / Visits Authorized:  10 / 20  Insurance Authorization Period: 2/18/2025 to 12/31/2025  Date of Evaluation: 2/18/25  Plan of Care Certification: 2/18/25 to 5/18/25     PT/PTA:     Number of PTA visits since last PT visit:   Time In: 1050   Time Out: 1138  Total Time: 48   Total Billable Time:      FOTO:  Intake Score:  %  Survey Score 1:  %  Survey Score 2:  %         Subjective           No new complaints   Objective            Treatment:  Balance/Neuromuscular Re-Education  NMR 1: Toe-Taps - 6" step with light fingertip for balance x 2 mins  NMR 2: Posterior pelvic tilt  x 15  with 5" hold  NMR 3: S/L clams x 20  each  NMR 4: S/L hip abduction x 20  NMR 5: SAQ's over tan roll - alternate LE's with quad mm activation on extension x 3 mins  NMR 6: Heel Cord stretch on wedge 3 x 30 sec  Therapeutic Activity  TA 1: Nu-Step x 15 mins Level 4  TA 2: 3-way hip with yellow tband x 15 each  TA 3: Step-ups: 6" stepx 15 leading with each LE  TA 4: Quad sets - 5" hold x 2 min  TA 5: Bridges x 20  TA 6: SLR 2 x 10 each leg  TA 7: Mini squats x 15  TA 8: Ball Squeezes x 2 mins 5"H    Time Entry(in minutes):  Neuromuscular Re-Education Time Entry: 20  Therapeutic Activity Time Entry: 28    Assessment & Plan   Assessment:       Pt did well with treatment at this time.   Patient will continue to benefit from skilled outpatient physical therapy to address the deficits listed in the problem list box on initial evaluation, provide pt/family education and to maximize pt's level of independence in the home and community " environment.     Patient's spiritual, cultural, and educational needs considered and patient agreeable to plan of care and goals.           Plan:      Goals:   Active       LTG's   6 weeks        Demonstrate ability to perform all household activities with no limitation  (Progressing)       Start:  02/17/25    Expected End:  04/04/25            Demonstrate ability to perform all work tasks with no limitation.  (Progressing)       Start:  02/17/25    Expected End:  04/04/25            Able to ambulate community required distances with no limitation  (Progressing)       Start:  02/17/25    Expected End:  04/04/25            Ascend/descend flight of steps with railing with no limitation  (Progressing)       Start:  02/17/25    Expected End:  04/04/25            Demonstrate independence with HEP for continued improvement in function  (Progressing)       Start:  02/17/25    Expected End:  04/04/25               STG's   3 weeks        Demonstrate improvement in recent symptoms to progress toward long term goals  (Progressing)       Start:  02/17/25    Expected End:  03/14/25            Correct postural deficits to reduce any pain and promote improvement in postural awareness for injury prevention  (Progressing)       Start:  02/17/25    Expected End:  03/14/25            Demonstrate compliance with initial exercise program  (Met)       Start:  02/17/25    Expected End:  03/14/25    Resolved:  03/04/25             Anahy Yanez, PT

## 2025-04-11 ENCOUNTER — CLINICAL SUPPORT (OUTPATIENT)
Dept: REHABILITATION | Facility: HOSPITAL | Age: 89
End: 2025-04-11
Payer: MEDICARE

## 2025-04-11 DIAGNOSIS — Z74.09 IMPAIRED FUNCTIONAL MOBILITY AND ACTIVITY TOLERANCE: ICD-10-CM

## 2025-04-11 DIAGNOSIS — G57.82 INTERDIGITAL NEUROMA OF LEFT FOOT: Primary | ICD-10-CM

## 2025-04-11 PROCEDURE — 97530 THERAPEUTIC ACTIVITIES: CPT | Mod: PN,CQ

## 2025-04-11 PROCEDURE — 97112 NEUROMUSCULAR REEDUCATION: CPT | Mod: PN,CQ

## 2025-04-11 NOTE — PROGRESS NOTES
"  Outpatient Rehab    Physical Therapy Visit    Patient Name: Janelle Posey  MRN: [id number]  YOB: 1935  Encounter Date: 4/11/2025    Therapy Diagnosis:   Encounter Diagnoses   Name Primary?    Interdigital neuroma of left foot Yes    Impaired functional mobility and activity tolerance      Physician: Grupo Hendrix IV, DPM    Physician Orders: Eval and Treat  Medical Diagnosis: Abnormality of gait    Visit # / Visits Authorized:  11 / 20  Insurance Authorization Period: 2/18/2025 to 12/31/2025  Date of Evaluation: 2/18/2025  Plan of Care Certification: 2/18/2025 to 5/18/2025     PT/PTA:     Number of PTA visits since last PT visit:  1  Time In:   10:45 AM  Time Out:   11:35 AM  Total Time:   50 Minutes  Total Billable Time:   45 Minutes    FOTO:  Intake Score:  %  Survey Score 1:  %  Survey Score 2:  %         Subjective   No new c/o's. Left knee was bothering me this morning, but it is ok now.  Pain reported as 0/10.      Objective            Treatment:  Balance/Neuromuscular Re-Education  NMR 1: Toe-Taps - 6" step with light fingertip for balance x 2 mins  NMR 2: Posterior pelvic tilt  x 15  with 5" hold  NMR 3: S/L clams x 20  each  NMR 4: S/L hip abduction x 20  NMR 5: SAQ's over tan roll - alternate LE's with quad mm activation on extension x 3 mins  NMR 6: Heel Cord stretch on wedge 3 x 30 sec  Therapeutic Activity  TA 1: Nu-Step x 15 mins Level 4  TA 2: 3-way hip with yellow tband x 15 each  TA 3: Step-ups: 6" step x 15 leading with each LE  TA 4: Quad sets - 5" hold x 2 min  TA 5: Bridges x 20  TA 6: SLR 2 x 10 each leg  TA 7: Mini squats x 15  TA 8: Ball Squeezes x 2 mins 5"H    Time Entry(in minutes):  Neuromuscular Re-Education Time Entry: 15  Therapeutic Activity Time Entry: 30    Assessment & Plan   Assessment: Janelle did very well with exercises; no exacerbations noted. Pt needs VC to perform the exercises correctly.       Patient will continue to benefit from skilled outpatient physical " therapy to address the deficits listed in the problem list box on initial evaluation, provide pt/family education and to maximize pt's level of independence in the home and community environment.     Patient's spiritual, cultural, and educational needs considered and patient agreeable to plan of care and goals.           Plan: Continue with Skilled physical therapy intervention to address LE weakness and gait/balance deficits as noted in POC. treatment 2x/week    Goals:   Active       LTG's   6 weeks        Demonstrate ability to perform all household activities with no limitation  (Progressing)       Start:  02/17/25    Expected End:  04/04/25            Demonstrate ability to perform all work tasks with no limitation.  (Progressing)       Start:  02/17/25    Expected End:  04/04/25            Able to ambulate community required distances with no limitation  (Progressing)       Start:  02/17/25    Expected End:  04/04/25            Ascend/descend flight of steps with railing with no limitation  (Progressing)       Start:  02/17/25    Expected End:  04/04/25            Demonstrate independence with HEP for continued improvement in function  (Progressing)       Start:  02/17/25    Expected End:  04/04/25               STG's   3 weeks        Demonstrate improvement in recent symptoms to progress toward long term goals  (Progressing)       Start:  02/17/25    Expected End:  03/14/25            Correct postural deficits to reduce any pain and promote improvement in postural awareness for injury prevention  (Progressing)       Start:  02/17/25    Expected End:  03/14/25            Demonstrate compliance with initial exercise program  (Met)       Start:  02/17/25    Expected End:  03/14/25    Resolved:  03/04/25             Jonathan Favre, PTA

## 2025-04-14 ENCOUNTER — CLINICAL SUPPORT (OUTPATIENT)
Dept: REHABILITATION | Facility: HOSPITAL | Age: 89
End: 2025-04-14
Payer: MEDICARE

## 2025-04-14 DIAGNOSIS — Z74.09 IMPAIRED FUNCTIONAL MOBILITY AND ACTIVITY TOLERANCE: ICD-10-CM

## 2025-04-14 DIAGNOSIS — G57.82 INTERDIGITAL NEUROMA OF LEFT FOOT: Primary | ICD-10-CM

## 2025-04-14 PROCEDURE — 97112 NEUROMUSCULAR REEDUCATION: CPT | Mod: PN,CQ

## 2025-04-14 PROCEDURE — 97530 THERAPEUTIC ACTIVITIES: CPT | Mod: PN,CQ

## 2025-04-14 NOTE — PROGRESS NOTES
"  Outpatient Rehab    Physical Therapy Visit    Patient Name: Janelle Posey  MRN: [id number]  YOB: 1935  Encounter Date: 4/14/2025    Therapy Diagnosis:   Encounter Diagnoses   Name Primary?    Interdigital neuroma of left foot Yes    Impaired functional mobility and activity tolerance      Physician: Grupo Hendrix IV, DPM    Physician Orders: Eval and Treat  Medical Diagnosis: Abnormality of gait    Visit # / Visits Authorized:  12 / 20  Insurance Authorization Period: 2/18/2025 to 12/31/2025  Date of Evaluation: 2/18/2025  Plan of Care Certification: 2/18/2025 to 5/18/2025     PT/PTA:     Number of PTA visits since last PT visit:  2  Time In:   10:50 AM  Time Out:   11:40 AM  Total Time:   50 Minutes  Total Billable Time:   45 Minutes    FOTO:  Intake Score:  %  Survey Score 1:  %  Survey Score 2:  %         Subjective   No new c/o's.  Pain reported as 0/10.      Objective            Treatment:  Balance/Neuromuscular Re-Education  NMR 1: Toe-Taps - 6" step with light fingertip for balance x 2 mins  NMR 2: Posterior pelvic tilt  x 15  with 5" hold  NMR 3: S/L clams x 20  each  NMR 4: S/L hip abduction x 20  NMR 5: SAQ's over tan roll - alternate LE's with quad mm activation on extension x 3 mins  NMR 6: Heel Cord stretch on wedge 3 x 30 sec  Therapeutic Activity  TA 1: Nu-Step x 15 mins Level 4  TA 2: 3-way hip with yellow tband x 15 each  TA 3: Step-ups: 6" step x 15 leading with each LE  TA 4: Quad sets - 5" hold x 2 min  TA 5: Bridges x 20  TA 6: SLR 2 x 10 each leg  TA 7: Mini squats x 15  TA 8: Ball Squeezes x 2 mins 5"H    Time Entry(in minutes):       Assessment & Plan   Assessment: Janelle did very well with exercises; no exacerbations noted. Pt needs VC to perform the exercises correctly.       Patient will continue to benefit from skilled outpatient physical therapy to address the deficits listed in the problem list box on initial evaluation, provide pt/family education and to maximize pt's " level of independence in the home and community environment.     Patient's spiritual, cultural, and educational needs considered and patient agreeable to plan of care and goals.           Plan: Continue with Skilled physical therapy intervention to address LE weakness and gait/balance deficits as noted in POC. treatment 2x/week    Goals:   Active       LTG's   6 weeks        Demonstrate ability to perform all household activities with no limitation  (Progressing)       Start:  02/17/25    Expected End:  04/04/25            Demonstrate ability to perform all work tasks with no limitation.  (Progressing)       Start:  02/17/25    Expected End:  04/04/25            Able to ambulate community required distances with no limitation  (Progressing)       Start:  02/17/25    Expected End:  04/04/25            Ascend/descend flight of steps with railing with no limitation  (Progressing)       Start:  02/17/25    Expected End:  04/04/25            Demonstrate independence with HEP for continued improvement in function  (Progressing)       Start:  02/17/25    Expected End:  04/04/25               STG's   3 weeks        Demonstrate improvement in recent symptoms to progress toward long term goals  (Progressing)       Start:  02/17/25    Expected End:  03/14/25            Correct postural deficits to reduce any pain and promote improvement in postural awareness for injury prevention  (Progressing)       Start:  02/17/25    Expected End:  03/14/25            Demonstrate compliance with initial exercise program  (Met)       Start:  02/17/25    Expected End:  03/14/25    Resolved:  03/04/25             Jonathan Favre, PTA

## 2025-04-24 ENCOUNTER — CLINICAL SUPPORT (OUTPATIENT)
Dept: REHABILITATION | Facility: HOSPITAL | Age: 89
End: 2025-04-24
Payer: MEDICARE

## 2025-04-24 DIAGNOSIS — Z74.09 IMPAIRED FUNCTIONAL MOBILITY AND ACTIVITY TOLERANCE: ICD-10-CM

## 2025-04-24 DIAGNOSIS — G57.82 INTERDIGITAL NEUROMA OF LEFT FOOT: Primary | ICD-10-CM

## 2025-04-24 PROCEDURE — 97150 GROUP THERAPEUTIC PROCEDURES: CPT | Mod: PN,CQ

## 2025-04-24 PROCEDURE — 97530 THERAPEUTIC ACTIVITIES: CPT | Mod: PN,CQ

## 2025-04-24 NOTE — PROGRESS NOTES
"  Outpatient Rehab    Physical Therapy Visit    Patient Name: Janelle Posey  MRN: [id number]  YOB: 1935  Encounter Date: 4/24/2025    Therapy Diagnosis:   Encounter Diagnoses   Name Primary?    Interdigital neuroma of left foot Yes    Impaired functional mobility and activity tolerance      Physician: Grupo Hendrix IV, DPM    Physician Orders: Eval and Treat  Medical Diagnosis: Abnormality of gait    Visit # / Visits Authorized:  13 / 20  Insurance Authorization Period: 2/18/2025 to 12/31/2025  Date of Evaluation: 2/18/2025  Plan of Care Certification: 2/18/2025 to 5/18/2025     PT/PTA:     Number of PTA visits since last PT visit:  3  Time In:   11:00 AM  Time Out:   11:35 AM  Total Time:   35 Minutes  Total Billable Time:   30 Minutes split billing    FOTO:  Intake Score:  %  Survey Score 1:  %  Survey Score 2:  %         Subjective   My legs hurt today; I sat in a meeting for two hours and now I'm paying for it.  Pain reported as 6/10. BLE's    Objective            Treatment:  Balance/Neuromuscular Re-Education  NMR 1: Toe-Taps - 6" step with light fingertip for balance x 2 mins  NMR 2: Posterior pelvic tilt  x 15  with 5" hold  NMR 3: S/L clams x 20  each  NMR 4: S/L hip abduction x 20  NMR 5: SAQ's over tan roll - alternate LE's with quad mm activation on extension x 3 mins  NMR 6: Heel Cord stretch on wedge 3 x 30 sec  Therapeutic Activity  TA 1: Nu-Step x 15 mins Level 4  TA 2: 3-way hip with yellow tband x 15 each  TA 3: Step-ups: 6" step x 15 leading with each LE  TA 4: Quad sets - 5" hold x 2 min  TA 5: Bridges x 20  TA 6: SLR 2 x 10 each leg  TA 7: Mini squats x 15  TA 8: Ball Squeezes x 2 mins 5"H    Time Entry(in minutes):       Assessment & Plan   Assessment: Janelle did very well with exercises; no exacerbations noted. Pt needs VC to perform the exercises correctly.       Patient will continue to benefit from skilled outpatient physical therapy to address the deficits listed in the problem " list box on initial evaluation, provide pt/family education and to maximize pt's level of independence in the home and community environment.     Patient's spiritual, cultural, and educational needs considered and patient agreeable to plan of care and goals.           Plan: Continue with Skilled physical therapy intervention to address LE weakness and gait/balance deficits as noted in POC. treatment 2x/week    Goals:   Active       LTG's   6 weeks        Demonstrate ability to perform all household activities with no limitation  (Progressing)       Start:  02/17/25    Expected End:  04/04/25            Demonstrate ability to perform all work tasks with no limitation.  (Progressing)       Start:  02/17/25    Expected End:  04/04/25            Able to ambulate community required distances with no limitation  (Progressing)       Start:  02/17/25    Expected End:  04/04/25            Ascend/descend flight of steps with railing with no limitation  (Progressing)       Start:  02/17/25    Expected End:  04/04/25            Demonstrate independence with HEP for continued improvement in function  (Progressing)       Start:  02/17/25    Expected End:  04/04/25               STG's   3 weeks        Demonstrate improvement in recent symptoms to progress toward long term goals  (Progressing)       Start:  02/17/25    Expected End:  03/14/25            Correct postural deficits to reduce any pain and promote improvement in postural awareness for injury prevention  (Progressing)       Start:  02/17/25    Expected End:  03/14/25            Demonstrate compliance with initial exercise program  (Met)       Start:  02/17/25    Expected End:  03/14/25    Resolved:  03/04/25             Jonathan Favre, PTA

## 2025-05-01 ENCOUNTER — CLINICAL SUPPORT (OUTPATIENT)
Dept: REHABILITATION | Facility: HOSPITAL | Age: 89
End: 2025-05-01
Payer: MEDICARE

## 2025-05-01 DIAGNOSIS — Z74.09 IMPAIRED FUNCTIONAL MOBILITY AND ACTIVITY TOLERANCE: ICD-10-CM

## 2025-05-01 DIAGNOSIS — G57.82 INTERDIGITAL NEUROMA OF LEFT FOOT: Primary | ICD-10-CM

## 2025-05-01 PROCEDURE — 97112 NEUROMUSCULAR REEDUCATION: CPT | Mod: PN,CQ

## 2025-05-01 PROCEDURE — 97530 THERAPEUTIC ACTIVITIES: CPT | Mod: PN,CQ

## 2025-05-01 NOTE — PROGRESS NOTES
"  Outpatient Rehab    Physical Therapy Visit    Patient Name: Janelle Posey  MRN: [id number]  YOB: 1935  Encounter Date: 5/1/2025    Therapy Diagnosis:   Encounter Diagnoses   Name Primary?    Interdigital neuroma of left foot Yes    Impaired functional mobility and activity tolerance      Physician: Grupo Hendrix IV, DPM    Physician Orders: Eval and Treat  Medical Diagnosis: Abnormality of gait    Visit # / Visits Authorized:  14 / 20  Insurance Authorization Period: 2/18/2025 to 12/31/2025  Date of Evaluation: 2/18/2025  Plan of Care Certification: 2/18/2025 to 5/18/2025     PT/PTA:     Number of PTA visits since last PT visit:  4  Time In:   10:45 AM  Time Out:   11:20 AM  Total Time:   35 Minutes  Total Billable Time:   30 Minutes split billing    FOTO:  Intake Score:  %  Survey Score 1:  %  Survey Score 2:  %         Subjective   I feel good during the day, still having some left foot pain/discomfort at night.  Pain reported as 2/10. BLE's / Left Foot    Objective            Treatment:  Balance/Neuromuscular Re-Education  NMR 1: Toe-Taps - 6" step with light fingertip for balance x 2 mins  NMR 2: Posterior pelvic tilt  x 15  with 5" hold  NMR 3: S/L clams x 20  each  NMR 4: S/L hip abduction x 20  NMR 5: SAQ's over tan roll - alternate LE's with quad mm activation on extension x 3 mins  NMR 6: Heel Cord stretch on wedge 3 x 30 sec  Therapeutic Activity  TA 1: Nu-Step x 15 mins Level 4  TA 2: 3-way hip with red tband x 15 each  TA 3: Step-ups: 6" step x 15 leading with each LE  TA 4: Quad sets - 5" hold x 2 min  TA 5: Bridges x 20  TA 6: SLR 2 x 10 each leg  TA 7: Mini squats x 15  TA 8: Ball Squeezes x 2 mins 5"H    Time Entry(in minutes):  Neuromuscular Re-Education Time Entry: 15  Therapeutic Activity Time Entry: 30    Assessment & Plan   Assessment: Janelle did very well with exercises; no exacerbations noted. Patient needs VC's for technique       Patient will continue to benefit from skilled " outpatient physical therapy to address the deficits listed in the problem list box on initial evaluation, provide pt/family education and to maximize pt's level of independence in the home and community environment.     Patient's spiritual, cultural, and educational needs considered and patient agreeable to plan of care and goals.           Plan: Continue with Skilled physical therapy intervention to address LE weakness and gait/balance deficits as noted in POC. treatment 2x/week    Goals:   Active       LTG's   6 weeks        Demonstrate ability to perform all household activities with no limitation  (Progressing)       Start:  02/17/25    Expected End:  04/04/25            Demonstrate ability to perform all work tasks with no limitation.  (Progressing)       Start:  02/17/25    Expected End:  04/04/25            Able to ambulate community required distances with no limitation  (Progressing)       Start:  02/17/25    Expected End:  04/04/25            Ascend/descend flight of steps with railing with no limitation  (Progressing)       Start:  02/17/25    Expected End:  04/04/25            Demonstrate independence with HEP for continued improvement in function  (Progressing)       Start:  02/17/25    Expected End:  04/04/25               STG's   3 weeks        Demonstrate improvement in recent symptoms to progress toward long term goals  (Progressing)       Start:  02/17/25    Expected End:  03/14/25            Correct postural deficits to reduce any pain and promote improvement in postural awareness for injury prevention  (Progressing)       Start:  02/17/25    Expected End:  03/14/25            Demonstrate compliance with initial exercise program  (Met)       Start:  02/17/25    Expected End:  03/14/25    Resolved:  03/04/25             Jonathan Favre, PTA

## 2025-05-05 ENCOUNTER — CLINICAL SUPPORT (OUTPATIENT)
Dept: REHABILITATION | Facility: HOSPITAL | Age: 89
End: 2025-05-05
Payer: MEDICARE

## 2025-05-05 DIAGNOSIS — Z74.09 IMPAIRED FUNCTIONAL MOBILITY AND ACTIVITY TOLERANCE: ICD-10-CM

## 2025-05-05 DIAGNOSIS — G57.82 INTERDIGITAL NEUROMA OF LEFT FOOT: Primary | ICD-10-CM

## 2025-05-05 PROCEDURE — 97530 THERAPEUTIC ACTIVITIES: CPT | Mod: PN

## 2025-05-06 NOTE — PROGRESS NOTES
"  Outpatient Rehab    Physical Therapy Visit    Patient Name: Janelle Posey  MRN: [id number]  YOB: 1935  Encounter Date: 5/5/2025    Therapy Diagnosis:   Encounter Diagnoses   Name Primary?    Interdigital neuroma of left foot Yes    Impaired functional mobility and activity tolerance      Physician: Grupo Hendrix IV, DPM    Physician Orders: Eval and Treat  Medical Diagnosis: Abnormality of gait    Visit # / Visits Authorized:  15 / 20  Insurance Authorization Period: 2/18/2025 to 12/31/2025  Date of Evaluation: 2/18/2025  Plan of Care Certification: 2/18/2025 to 5/18/2025     PT/PTA:     Number of PTA visits since last PT visit:   Time In: 1000   Time Out: 1100  Total Time (in minutes): 60   Total Billable Time (in minutes): 30 - split billing     FOTO:  Intake Score:  %  Survey Score 2:  %  Survey Score 3:  %         Subjective   Patient reports that she is doing fine today; is still getting some left foot numbness at night still.  Pain reported as 2/10. BLE's / Left Foot    Objective            Treatment:  Balance/Neuromuscular Re-Education  NMR 2: Posterior pelvic tilt  x 15  with 5" hold  NMR 3: S/L clams x 20  each  NMR 4: S/L hip abduction x 20  NMR 5: SAQ's over tan roll - alternate LE's with quad mm activation on extension x 3 mins  NMR 6: Heel Cord stretch on wedge 3 x 30 sec  Therapeutic Activity  TA 1: Nu-Step x 15 mins Level 4  TA 2: 3-way hip with red tband x 15 each  TA 3: Step-ups: 6" step x 15 leading with each LE  TA 4: Quad sets - 5" hold x 2 min  TA 5: Bridges x 20  TA 6: SLR 2 x 10 each leg  TA 8: Ball Squeezes x 2 mins 5"H    Time Entry(in minutes):  Therapeutic Activity Time Entry: 30    Assessment & Plan   Assessment: Janelle did very well with exercises; no exacerbations noted. Patient needs VC's for technique       Patient will continue to benefit from skilled outpatient physical therapy to address the deficits listed in the problem list box on initial evaluation, provide " pt/family education and to maximize pt's level of independence in the home and community environment.     Patient's spiritual, cultural, and educational needs considered and patient agreeable to plan of care and goals.           Plan: Continue with SKilled physical therapy 2x/week for LE strengthening, balance improvement to reduce fall risk and improve daily mobility.    Goals:   Active       LTG's   6 weeks        Demonstrate ability to perform all household activities with no limitation  (Progressing)       Start:  02/17/25    Expected End:  05/30/25            Demonstrate ability to perform all work tasks with no limitation.  (Progressing)       Start:  02/17/25    Expected End:  05/30/25            Able to ambulate community required distances with no limitation  (Progressing)       Start:  02/17/25    Expected End:  05/30/25            Ascend/descend flight of steps with railing with no limitation  (Progressing)       Start:  02/17/25    Expected End:  05/30/25            Demonstrate independence with HEP for continued improvement in function  (Progressing)       Start:  02/17/25    Expected End:  05/30/25              Resolved       STG's   3 weeks        Demonstrate improvement in recent symptoms to progress toward long term goals  (Met)       Start:  02/17/25    Expected End:  03/14/25    Resolved:  05/05/25         Correct postural deficits to reduce any pain and promote improvement in postural awareness for injury prevention  (Met)       Start:  02/17/25    Expected End:  03/14/25    Resolved:  05/05/25         Demonstrate compliance with initial exercise program  (Met)       Start:  02/17/25    Expected End:  03/14/25    Resolved:  03/04/25             Lori Piper, PT

## 2025-05-08 DIAGNOSIS — J31.0 CHRONIC RHINITIS: ICD-10-CM

## 2025-05-08 RX ORDER — FLUTICASONE PROPIONATE 50 MCG
1 SPRAY, SUSPENSION (ML) NASAL 2 TIMES DAILY
Qty: 48 G | Refills: 3 | Status: SHIPPED | OUTPATIENT
Start: 2025-05-08

## 2025-05-09 NOTE — TELEPHONE ENCOUNTER
Refill Decision Note   Selene Taty  is requesting a refill authorization.  Brief Assessment and Rationale for Refill:  Approve     Medication Therapy Plan:         Comments:     Note composed:10:50 PM 05/08/2025            From: Annamaria Choudhary  To: Annette Robles  Sent: 8/21/2023 7:05 AM EDT  Subject: 4 week symptoms with heavy breathing    Hello,     I requested an appointment via [email or web address]. My cough does not want to go away and last 24 hours my breathing feels heavy/labored.      ~ Geraldo Wilkins

## 2025-05-09 NOTE — TELEPHONE ENCOUNTER
No care due was identified.  [facility] Embedded Care Due Messages. Reference number: [id number].   5/08/2025 8:33:11 PM CDT

## 2025-05-15 ENCOUNTER — CLINICAL SUPPORT (OUTPATIENT)
Dept: REHABILITATION | Facility: HOSPITAL | Age: 89
End: 2025-05-15
Payer: MEDICARE

## 2025-05-15 DIAGNOSIS — Z74.09 IMPAIRED FUNCTIONAL MOBILITY AND ACTIVITY TOLERANCE: ICD-10-CM

## 2025-05-15 DIAGNOSIS — G57.82 INTERDIGITAL NEUROMA OF LEFT FOOT: Primary | ICD-10-CM

## 2025-05-15 PROCEDURE — 97112 NEUROMUSCULAR REEDUCATION: CPT | Mod: PN

## 2025-05-15 PROCEDURE — 97530 THERAPEUTIC ACTIVITIES: CPT | Mod: PN

## 2025-05-17 NOTE — PROGRESS NOTES
"  Outpatient Rehab    Physical Therapy Visit    Patient Name: Janelle Posey  MRN: [id number]  YOB: 1935  Encounter Date: 5/15/2025    Therapy Diagnosis:   Encounter Diagnoses   Name Primary?    Interdigital neuroma of left foot Yes    Impaired functional mobility and activity tolerance      Physician: Grupo Hendrix IV, DPM    Physician Orders: Eval and Treat  Medical Diagnosis: Abnormality of gait    Visit # / Visits Authorized:  16 / 32  Insurance Authorization Period: 2/18/2025 to 12/31/2025  Date of Evaluation: 2/17/2025  Plan of Care Certification: 2/17/2025 to 5/17/2025      PT/PTA:     Number of PTA visits since last PT visit:   Time In: 1100   Time Out: 1200  Total Time (in minutes): 60   Total Billable Time (in minutes): 30 split billing Medicare     FOTO:  Intake Score:  %  Survey Score 2:  %  Survey Score 3:  %    Precautions:       Subjective   My foot has been really bothering me again, I quit wearing the Sketchers because they just really hurt my foot.  These Flip-flops feel so my better..  Pain reported as 4/10. Left foot    Objective            Treatment:  Balance/Neuromuscular Re-Education  NMR 2: Posterior pelvic tilt  x 15  with 5" hold  NMR 3: S/L clams x 20  each  NMR 4: S/L hip abduction x 20  NMR 5: SAQ's over tan roll - alternate LE's with quad mm activation on extension x 3 mins  Therapeutic Activity  TA 1: Nu-Step x 15 mins Level 4  TA 4: Quad sets - 5" hold x 2 min  TA 5: Bridges x 20  TA 6: SLR 2 x 10 each leg  TA 8: Ball Squeezes x 2 mins 5"H    Time Entry(in minutes):  Neuromuscular Re-Education Time Entry: 15  Therapeutic Activity Time Entry: 20    Assessment & Plan   Assessment: Refrained from standing exercises today as Ambikas foot is hurting. Suggested to Janelle that she return to podiatrist - appears that injection into neuorma as worn off. Told her that we talked about her tennis shoes being too narrow, too short for her feet, but she felt like thay fit her OK.  " Suggested that she find a different pair of sandals to wear that have better support to protect her feet.  She is not walking well in what she has on right now - shuffling her feet.   Janelle said that she would try and find something else to wear.  Evaluation/Treatment Tolerance: Patient limited by pain    Patient will continue to benefit from skilled outpatient physical therapy to address the deficits listed in the problem list box on initial evaluation, provide pt/family education and to maximize pt's level of independence in the home and community environment.     Patient's spiritual, cultural, and educational needs considered and patient agreeable to plan of care and goals.           Plan: WIll continue with SKilled PT - reduce inflammation on plantar surface of her foot; improve footwear.    Goals:   Active       LTG's   6 weeks        Demonstrate ability to perform all household activities with no limitation  (Progressing)       Start:  02/17/25    Expected End:  05/30/25            Demonstrate ability to perform all work tasks with no limitation.  (Progressing)       Start:  02/17/25    Expected End:  05/30/25            Able to ambulate community required distances with no limitation  (Not Progressing)       Start:  02/17/25    Expected End:  05/30/25            Ascend/descend flight of steps with railing with no limitation  (Not Progressing)       Start:  02/17/25    Expected End:  05/30/25            Demonstrate independence with HEP for continued improvement in function  (Progressing)       Start:  02/17/25    Expected End:  05/30/25              Resolved       STG's   3 weeks        Demonstrate improvement in recent symptoms to progress toward long term goals  (Met)       Start:  02/17/25    Expected End:  03/14/25    Resolved:  05/05/25         Correct postural deficits to reduce any pain and promote improvement in postural awareness for injury prevention  (Met)       Start:  02/17/25    Expected End:   03/14/25    Resolved:  05/05/25         Demonstrate compliance with initial exercise program  (Met)       Start:  02/17/25    Expected End:  03/14/25    Resolved:  03/04/25             Lori Piper PT

## 2025-05-22 ENCOUNTER — CLINICAL SUPPORT (OUTPATIENT)
Dept: REHABILITATION | Facility: HOSPITAL | Age: 89
End: 2025-05-22
Payer: MEDICARE

## 2025-05-22 DIAGNOSIS — G57.82 INTERDIGITAL NEUROMA OF LEFT FOOT: Primary | ICD-10-CM

## 2025-05-22 DIAGNOSIS — Z74.09 IMPAIRED FUNCTIONAL MOBILITY AND ACTIVITY TOLERANCE: ICD-10-CM

## 2025-05-22 PROCEDURE — 97530 THERAPEUTIC ACTIVITIES: CPT | Mod: PN,CQ

## 2025-05-22 PROCEDURE — 97112 NEUROMUSCULAR REEDUCATION: CPT | Mod: PN,CQ

## 2025-05-22 NOTE — PROGRESS NOTES
"  Outpatient Rehab    Physical Therapy Visit    Patient Name: Janelle Posey  MRN: [id number]  YOB: 1935  Encounter Date: 5/22/2025    Therapy Diagnosis:   Encounter Diagnoses   Name Primary?    Interdigital neuroma of left foot Yes    Impaired functional mobility and activity tolerance      Physician: Grupo Hendrix IV, DPM    Physician Orders: Eval and Treat  Medical Diagnosis: Abnormality of gait    Visit # / Visits Authorized:  17 / 32  Insurance Authorization Period: 2/18/2025 to 12/31/2025  Date of Evaluation: 2/17/2025  Plan of Care Certification: 2/17/2025 to 5/17/2025      PT/PTA:     Number of PTA visits since last PT visit:  1  Time In:   11:00 AM  Time Out:   11:35 AM  Total Time (in minutes):   35 Minutes  Total Billable Time (in minutes):   30 Minutes    FOTO:  Intake Score:  %  Survey Score 2:  %  Survey Score 3:  %    Precautions:       Subjective   No new c/o's.  Pain reported as 3/10. Left foot    Objective            Treatment:  Balance/Neuromuscular Re-Education  NMR 2: Posterior pelvic tilt  x 15  with 5" hold  NMR 3: S/L clams x 20  each  NMR 4: S/L hip abduction x 20  NMR 5: SAQ's over tan roll - alternate LE's with quad mm activation on extension x 3 mins  Therapeutic Activity  TA 1: Nu-Step x 15 mins Level 4  TA 4: Quad sets - 5" hold x 2 min  TA 5: Bridges x 20  TA 6: SLR 2 x 10 each leg  TA 8: Ball Squeezes x 2 mins 5"H    Time Entry(in minutes):  Neuromuscular Re-Education Time Entry: 10  Therapeutic Activity Time Entry: 20    Assessment & Plan   Assessment: Patient did ok with limited exercise; ended session early per patient       The patient will continue to benefit from skilled outpatient physical therapy in order to address the deficits listed in the problem list on the initial evaluation, provide patient and family education, and maximize the patients level of independence in the home and community environments.     The patient's spiritual, cultural, and educational needs " were considered, and the patient is agreeable to the plan of care and goals.           Plan: Continue per POC and progress with strength and mobility exercises as patient tolerates    Goals:   Active       LTG's   6 weeks        Demonstrate ability to perform all household activities with no limitation  (Progressing)       Start:  02/17/25    Expected End:  05/30/25            Demonstrate ability to perform all work tasks with no limitation.  (Progressing)       Start:  02/17/25    Expected End:  05/30/25            Able to ambulate community required distances with no limitation  (Progressing)       Start:  02/17/25    Expected End:  05/30/25            Ascend/descend flight of steps with railing with no limitation  (Progressing)       Start:  02/17/25    Expected End:  05/30/25            Demonstrate independence with HEP for continued improvement in function  (Progressing)       Start:  02/17/25    Expected End:  05/30/25              Resolved       STG's   3 weeks        Demonstrate improvement in recent symptoms to progress toward long term goals  (Met)       Start:  02/17/25    Expected End:  03/14/25    Resolved:  05/05/25         Correct postural deficits to reduce any pain and promote improvement in postural awareness for injury prevention  (Met)       Start:  02/17/25    Expected End:  03/14/25    Resolved:  05/05/25         Demonstrate compliance with initial exercise program  (Met)       Start:  02/17/25    Expected End:  03/14/25    Resolved:  03/04/25             Jonathan Favre, PTA

## 2025-05-29 ENCOUNTER — CLINICAL SUPPORT (OUTPATIENT)
Dept: REHABILITATION | Facility: HOSPITAL | Age: 89
End: 2025-05-29
Payer: MEDICARE

## 2025-05-29 DIAGNOSIS — G57.82 INTERDIGITAL NEUROMA OF LEFT FOOT: Primary | ICD-10-CM

## 2025-05-29 DIAGNOSIS — Z74.09 IMPAIRED FUNCTIONAL MOBILITY AND ACTIVITY TOLERANCE: ICD-10-CM

## 2025-05-29 PROCEDURE — 97530 THERAPEUTIC ACTIVITIES: CPT | Mod: PN

## 2025-05-30 NOTE — PROGRESS NOTES
"  Outpatient Rehab    Physical Therapy Visit    Patient Name: Janelle Posey  MRN: [id number]  YOB: 1935  Encounter Date: 5/29/2025    Therapy Diagnosis:   Encounter Diagnoses   Name Primary?    Interdigital neuroma of left foot Yes    Impaired functional mobility and activity tolerance      Physician: Grupo Hendrix IV, DPM    Physician Orders: Eval and Treat  Medical Diagnosis: Abnormality of gait    Visit # / Visits Authorized:  18 / 32  Insurance Authorization Period: 2/18/2025 to 12/31/2025  Date of Evaluation: 2/17/2025  Plan of Care Certification: 2/17/2025 to 5/17/2025      PT/PTA: PT   Number of PTA visits since last PT visit:0  Time In:     Time Out:    Total Time (in minutes):     Total Billable Time (in minutes):      FOTO:  Intake Score:  %  Survey Score 2:  %  Survey Score 3:  %    Precautions:       Subjective   I'm doing OK today;  I feel like this is helping .    "I'm just going to do the Nu-Step today, that's all I want to do" patient stated that her foot is feeling better now that she is no longer wearing the  tennis shoes.  Has been wearing flip-flops without pain..  Pain reported as 3/10. Left foot    Objective            Treatment:  Therapeutic Activity  TA 1: Nu-Step x 25 mins Level 4    Time Entry(in minutes):  Therapeutic Activity Time Entry: 25    Assessment & Plan   Assessment: Patient not wanting to do anything but the Nu-Step today; ended session after 30 mins.  Evaluation/Treatment Tolerance: Patient limited by fatigue    The patient will continue to benefit from skilled outpatient physical therapy in order to address the deficits listed in the problem list on the initial evaluation, provide patient and family education, and maximize the patients level of independence in the home and community environments.     The patient's spiritual, cultural, and educational needs were considered, and the patient is agreeable to the plan of care and goals.           Plan: Continue " per POC and progress with strength and mobility exercises as patient tolerates; 2 visits left on POC.  WIll discharge after completion.    Goals:   Active       LTG's   6 weeks        Demonstrate ability to perform all household activities with no limitation  (Progressing)       Start:  02/17/25    Expected End:  05/30/25            Demonstrate ability to perform all work tasks with no limitation.  (Progressing)       Start:  02/17/25    Expected End:  05/30/25            Able to ambulate community required distances with no limitation  (Progressing)       Start:  02/17/25    Expected End:  05/30/25            Ascend/descend flight of steps with railing with no limitation  (Progressing)       Start:  02/17/25    Expected End:  05/30/25            Demonstrate independence with HEP for continued improvement in function  (Progressing)       Start:  02/17/25    Expected End:  05/30/25              Resolved       STG's   3 weeks        Demonstrate improvement in recent symptoms to progress toward long term goals  (Met)       Start:  02/17/25    Expected End:  03/14/25    Resolved:  05/05/25         Correct postural deficits to reduce any pain and promote improvement in postural awareness for injury prevention  (Met)       Start:  02/17/25    Expected End:  03/14/25    Resolved:  05/05/25         Demonstrate compliance with initial exercise program  (Met)       Start:  02/17/25    Expected End:  03/14/25    Resolved:  03/04/25             Lori Piper, PT

## 2025-06-03 ENCOUNTER — CLINICAL SUPPORT (OUTPATIENT)
Dept: REHABILITATION | Facility: HOSPITAL | Age: 89
End: 2025-06-03
Payer: MEDICARE

## 2025-06-03 DIAGNOSIS — G57.82 INTERDIGITAL NEUROMA OF LEFT FOOT: Primary | ICD-10-CM

## 2025-06-03 DIAGNOSIS — Z74.09 IMPAIRED FUNCTIONAL MOBILITY AND ACTIVITY TOLERANCE: ICD-10-CM

## 2025-06-03 PROCEDURE — 97112 NEUROMUSCULAR REEDUCATION: CPT | Mod: PN

## 2025-06-03 PROCEDURE — 97530 THERAPEUTIC ACTIVITIES: CPT | Mod: PN

## 2025-06-11 ENCOUNTER — CLINICAL SUPPORT (OUTPATIENT)
Dept: REHABILITATION | Facility: HOSPITAL | Age: 89
End: 2025-06-11
Payer: MEDICARE

## 2025-06-11 DIAGNOSIS — Z74.09 IMPAIRED FUNCTIONAL MOBILITY AND ACTIVITY TOLERANCE: ICD-10-CM

## 2025-06-11 DIAGNOSIS — G57.82 INTERDIGITAL NEUROMA OF LEFT FOOT: Primary | ICD-10-CM

## 2025-06-11 PROCEDURE — 97530 THERAPEUTIC ACTIVITIES: CPT | Mod: PN

## 2025-06-11 NOTE — PROGRESS NOTES
"  Outpatient Rehab    Physical Therapy Visit    Patient Name: Janelle Posey  MRN: [id number]  YOB: 1935  Encounter Date: 6/11/2025    Therapy Diagnosis:   Encounter Diagnoses   Name Primary?    Interdigital neuroma of left foot Yes    Impaired functional mobility and activity tolerance      Physician: Grupo Hendrix IV, DPM    Physician Orders: Eval and Treat  Medical Diagnosis: Abnormality of gait  Surgical Diagnosis: Not applicable for this Episode   Surgical Date: Not applicable for this Episode    Visit # / Visits Authorized:  20 / 32  Insurance Authorization Period: 2/18/2025 to 12/31/2025  Date of Evaluation: 2/17/2025  Plan of Care Certification: 5/17/2025 to 8/17/2025      PT/PTA: PT   Number of PTA visits since last PT visit:0  Time In: 1100   Time Out: 1130  Total Time (in minutes): 30   Total Billable Time (in minutes): 30- split billing     FOTO:  Intake Score:  %  Survey Score 2:  %  Survey Score 3:  %    Precautions:       Subjective   I'm doing alright today;  I still have the tingling in my feet. I can only do a little bit today, I have a closing on a property today..  Pain reported as 2/10. Left foot    Objective            Treatment:  Balance/Neuromuscular Re-Education  NMR 1: Toe-Taps  - 6" step with light fingertip balance x 2 mins  NMR 6: Heel cord stretch on wedge 30 sec x 3  NMR 7: Heel raises x20  Therapeutic Activity  TA 1: Nu-Step x 20mins Level 4  TA 3: Step-ups 6" step x 20 with each leg    Time Entry(in minutes):  Therapeutic Activity Time Entry: 30    Assessment & Plan   Assessment: Patient agreeable to performing a few more exercises this morning after 20 mins on the Nu-step; Report foot pain is much better;  Will discuss discharge with patient at next visit.  She no longer requires Skilled physical therapy at this time.  Evaluation/Treatment Tolerance: Patient tolerated treatment well    The patient will continue to benefit from skilled outpatient physical therapy in order " to address the deficits listed in the problem list on the initial evaluation, provide patient and family education, and maximize the patients level of independence in the home and community environments.     The patient's spiritual, cultural, and educational needs were considered, and the patient is agreeable to the plan of care and goals.           Plan: Patient has 1 visit remaining on POC;  Will plan for discharge at this time.  Patient can continue at home or join the gym in order to continue with her LE exercises.    Goals:   Active       LTG's   6 weeks        Demonstrate ability to perform all household activities with no limitation  (Met)       Start:  02/17/25    Expected End:  06/30/25    Resolved:  06/11/25         Demonstrate ability to perform all work tasks with no limitation.  (Met)       Start:  02/17/25    Expected End:  06/30/25    Resolved:  06/11/25         Able to ambulate community required distances with no limitation  (Progressing)       Start:  02/17/25    Expected End:  06/30/25            Ascend/descend flight of steps with railing with no limitation  (Progressing)       Start:  02/17/25    Expected End:  06/30/25            Demonstrate independence with HEP for continued improvement in function  (Progressing)       Start:  02/17/25    Expected End:  06/30/25              Resolved       STG's   3 weeks        Demonstrate improvement in recent symptoms to progress toward long term goals  (Met)       Start:  02/17/25    Expected End:  03/14/25    Resolved:  05/05/25         Correct postural deficits to reduce any pain and promote improvement in postural awareness for injury prevention  (Met)       Start:  02/17/25    Expected End:  03/14/25    Resolved:  05/05/25         Demonstrate compliance with initial exercise program  (Met)       Start:  02/17/25    Expected End:  03/14/25    Resolved:  03/04/25             Lori Piper, PT

## 2025-06-19 ENCOUNTER — OFFICE VISIT (OUTPATIENT)
Dept: PULMONOLOGY | Facility: CLINIC | Age: 89
End: 2025-06-19
Payer: MEDICARE

## 2025-06-19 ENCOUNTER — CLINICAL SUPPORT (OUTPATIENT)
Dept: REHABILITATION | Facility: HOSPITAL | Age: 89
End: 2025-06-19
Payer: MEDICARE

## 2025-06-19 VITALS
HEART RATE: 86 BPM | OXYGEN SATURATION: 96 % | SYSTOLIC BLOOD PRESSURE: 120 MMHG | BODY MASS INDEX: 23.39 KG/M2 | DIASTOLIC BLOOD PRESSURE: 60 MMHG | HEIGHT: 64 IN | WEIGHT: 137 LBS

## 2025-06-19 DIAGNOSIS — J47.9 BRONCHIECTASIS WITHOUT COMPLICATION: Primary | ICD-10-CM

## 2025-06-19 DIAGNOSIS — G57.82 INTERDIGITAL NEUROMA OF LEFT FOOT: Primary | ICD-10-CM

## 2025-06-19 DIAGNOSIS — Z74.09 IMPAIRED FUNCTIONAL MOBILITY AND ACTIVITY TOLERANCE: ICD-10-CM

## 2025-06-19 PROCEDURE — 99214 OFFICE O/P EST MOD 30 MIN: CPT | Mod: PBBFAC,PN | Performed by: NURSE PRACTITIONER

## 2025-06-19 PROCEDURE — 99999 PR PBB SHADOW E&M-EST. PATIENT-LVL IV: CPT | Mod: PBBFAC,,, | Performed by: NURSE PRACTITIONER

## 2025-06-19 PROCEDURE — 99213 OFFICE O/P EST LOW 20 MIN: CPT | Mod: S$PBB,,, | Performed by: NURSE PRACTITIONER

## 2025-06-19 PROCEDURE — 97112 NEUROMUSCULAR REEDUCATION: CPT | Mod: PN

## 2025-06-19 PROCEDURE — 97530 THERAPEUTIC ACTIVITIES: CPT | Mod: PN

## 2025-06-19 NOTE — PROGRESS NOTES
"  Outpatient Rehab    Physical Therapy Discharge    Patient Name: Janelle Posey  MRN: [id number]  YOB: 1935  Encounter Date: 6/19/2025    Therapy Diagnosis:   Encounter Diagnoses   Name Primary?    Interdigital neuroma of left foot Yes    Impaired functional mobility and activity tolerance      Physician: Grupo Hendrix IV, DPM    Physician Orders: Eval and Treat  Medical Diagnosis: Abnormality of gait  Surgical Diagnosis: Not applicable for this Episode   Surgical Date: Not applicable for this Episode  Days Since Last Surgery: Not applicable for this Episode    Visit # / Visits Authorized:  21 / 32  Insurance Authorization Period: 2/18/2025 to 12/31/2025  Date of Evaluation: 2/17/2025  Plan of Care Certification: 5/17/2025 to 8/17/2025      PT/PTA: PT   Number of PTA visits since last PT visit:0  Time In: 1100   Time Out: 1145  Total Time (in minutes): 45   Total Billable Time (in minutes): 43    FOTO:  Intake Score:  %  Survey Score 2:  %  Survey Score 3:  %    Precautions:       Subjective   I still have numbness in my feet, that's going to be there forever, I just have to learn to live with it. I can walk, and that's all that I care about..         Objective    Janelle demonstrates Independent ambulation at community distances without limitations; she is able to ascend/descend 5 steps with railing using reciprocal pattern; Strength in BLE's is functional; NO longer having foot or knee pain with activity.     Performing all of her work related tasks without limitation.         Treatment:  Balance/Neuromuscular Re-Education  NMR 1: Toe-Taps  - 6" step with light fingertip balance x 2 mins  NMR 6: Heel cord stretch on wedge 30 sec x 3  NMR 7: Heel raises x20  Therapeutic Activity  TA 1: Nu-Step x 20mins Level 4  TA 2: 3-way hip with level 1 band x 15 each  TA 3: Step-ups 6" step x 20 with each leg    Time Entry(in minutes):  Neuromuscular Re-Education Time Entry: 13  Therapeutic Activity Time Entry: " 30    Assessment & Plan   Assessment: Feel patient is ready for discharge from PT at this time.  She is ambulating without foot/knee pain; returned to all of her normal daily activities without difficulties.  Janelle agrees that she is ready to continue with all of her exercises at home now.  Will go ahead and discharge from PT at this time.   Evaluation/Treatment Tolerance: Patient tolerated treatment well    The patient's spiritual, cultural, and educational needs were considered, and the patient is agreeable to the plan of care and goals.           Plan: Patient is ready for discharge today.  Has her HEP, compliant with it. Goals are met.    Goals:   Resolved       LTG's   6 weeks        Demonstrate ability to perform all household activities with no limitation  (Met)       Start:  02/17/25    Expected End:  06/30/25    Resolved:  06/11/25         Demonstrate ability to perform all work tasks with no limitation.  (Met)       Start:  02/17/25    Expected End:  06/30/25    Resolved:  06/11/25         Able to ambulate community required distances with no limitation  (Met)       Start:  02/17/25    Expected End:  06/30/25    Resolved:  06/19/25         Ascend/descend flight of steps with railing with no limitation  (Met)       Start:  02/17/25    Expected End:  06/30/25    Resolved:  06/19/25         Demonstrate independence with HEP for continued improvement in function  (Met)       Start:  02/17/25    Expected End:  06/30/25    Resolved:  06/19/25            STG's   3 weeks        Demonstrate improvement in recent symptoms to progress toward long term goals  (Met)       Start:  02/17/25    Expected End:  03/14/25    Resolved:  05/05/25         Correct postural deficits to reduce any pain and promote improvement in postural awareness for injury prevention  (Met)       Start:  02/17/25    Expected End:  03/14/25    Resolved:  05/05/25         Demonstrate compliance with initial exercise program  (Met)       Start:   02/17/25    Expected End:  03/14/25    Resolved:  03/04/25             Lori Piper PT

## 2025-06-19 NOTE — PROGRESS NOTES
SUBJECTIVE:    Patient ID: Selene Posey is a 89 y.o. female.    Chief Complaint: Follow-up and Bronchiectasis    Patient here today feeling well. 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 She feels working keeps her mind going and keeps her busy.  She denies shortness of breath, no cough, no night sweats, no fever.  She does not feel the need to do PFT or chest xray since she is doing so well.       Past Medical History:   Diagnosis Date    Bronchiectasis     CKD (chronic kidney disease)     COVID-19 virus infection 5/16/2022    GERD (gastroesophageal reflux disease)     High cholesterol     Hypertension     Paroxysmal atrial fibrillation     Thyroid disease      Past Surgical History:   Procedure Laterality Date    APPENDECTOMY  1942    Emergency surgery    BREAST SURGERY  1975    EYE SURGERY  August 2022    HYSTERECTOMY  1969    ovaries retained    JOINT REPLACEMENT  2020    knee    TONSILLECTOMY  1944     Family History   Problem Relation Name Age of Onset    Breast cancer Mother Sowmya Piña 65    Cancer Mother Sowmya Piña         Social History:   Marital Status:   Occupation:   Alcohol History:  reports no history of alcohol use.  Tobacco History:  reports that she has never smoked. She has never been exposed to tobacco smoke. She has never used smokeless tobacco.  Drug History:  reports no history of drug use.    Review of patient's allergies indicates:   Allergen Reactions    Azithromycin     Codeine     Erythropoietin analogues     Pcn [penicillins]     Sulfa (sulfonamide antibiotics)     Zetia [ezetimibe]     Atorvastatin      Other reaction(s): Joint pain    Fenofibrate      Made patient hoarse.       Current Outpatient Medications   Medication Sig Dispense Refill    acyclovir (ZOVIRAX) 400 MG tablet Take 400 mg by mouth 2 (two) times daily as needed.      ascorbic acid, vitamin C, (VITAMIN C) 500 MG tablet Take 500 mg by mouth once daily.       aspirin (ECOTRIN) 81 MG EC tablet Take 1 tablet by mouth every morning.      azelastine (ASTELIN) 137 mcg (0.1 %) nasal spray 1 spray 2 (two) times daily.      clonazePAM (KLONOPIN) 0.5 MG tablet Take 1 tablet (0.5 mg total) by mouth 2 (two) times daily as needed for Anxiety. 60 tablet 0    fluticasone propionate (FLONASE) 50 mcg/actuation nasal spray SPRAY ONCE IN EACH NOSTRIL TWICE DAILY 48 g 3    levothyroxine (SYNTHROID) 88 MCG tablet Take 1 tablet (88 mcg total) by mouth before breakfast. 90 tablet 3    lutein 20 mg Cap Take 1 capsule by mouth once daily.      meclizine (ANTIVERT) 12.5 mg tablet Take 1 tablet (12.5 mg total) by mouth 3 (three) times daily as needed for Dizziness. 15 tablet 0    metoprolol succinate (TOPROL-XL) 25 MG 24 hr tablet Take 25 mg by mouth once daily.      mupirocin (BACTROBAN) 2 % ointment Apply topically 3 (three) times daily. 30 g 0    omeprazole (PRILOSEC) 40 MG capsule Take 40 mg by mouth before breakfast.      sodium chloride (OCEAN) 0.65 % nasal spray 1 spray by Nasal route daily as needed for Congestion.      levocetirizine (XYZAL) 5 MG tablet Take 1 tablet (5 mg total) by mouth every evening. (Patient not taking: Reported on 1/3/2025) 30 tablet 11     No current facility-administered medications for this visit.     PFT 2021 no obstruction, mild restriction, moderate diffusion defect.     Chest xray 09/2022  IMPRESSION:  No acute pulmonary process     Mild increase in interstitial markings suggestive of chronic changes      Last ct of chest 03/2021  IMPRESSION:     1. Mild bilateral lower lobe bronchiectasis.  2. 5 mm left midlung nodule unchanged since 2016 and presumably  benign.  3. Coronary artery calcifications.     General: Feeling Well.    Eyes: Vision is good.  ENT:  no complaints   Heart::  palpitations.  Lungs: Breathing is well   GI: GERD, dysphagia at times  : No dysuria, hesitancy, or nocturia.  Musculoskeletal: chronic neck discomfort   Skin: No lesions or  "rashes.  Neuro: occasional headaches   Lymph: legs swell during the day  Psych: anxiety better  Endo: weight stable     OBJECTIVE:      /60 (BP Location: Right arm, Patient Position: Sitting)   Pulse 86   Ht 5' 4" (1.626 m)   Wt 62.1 kg (137 lb)   SpO2 96%   BMI 23.52 kg/m²     Physical Exam  GENERAL: Older patient in no distress.  HEENT: Pupils equal and reactive. Extraocular movements intact. Nose intact.      Pharynx moist.   NECK: Supple.   HEART: Regular rate and rhythm. No murmur or gallop auscultated.  LUNGS: clear  ABDOMEN: Bowel sounds present. Non-tender, no masses palpated.  EXTREMITIES: Normal muscle tone and joint movement, no cyanosis or clubbing.   LYMPHATICS: No adenopathy palpated, trace edema to legs   SKIN: Dry, intact, no lesions.   NEURO: Cranial nerves II-XII intact. Motor strength 5/5 bilaterally, upper and lower extremities.  PSYCH: Appropriate affect.    Assessment:       1. Bronchiectasis without complication                    Plan:       Bronchiectasis without complication              Patient does not want to do PFT or chest xray       Follow up in about 1 year (around 6/19/2026).                          "

## 2025-07-14 ENCOUNTER — TELEPHONE (OUTPATIENT)
Dept: FAMILY MEDICINE | Facility: CLINIC | Age: 89
End: 2025-07-14
Payer: MEDICARE

## 2025-07-14 NOTE — TELEPHONE ENCOUNTER
Copied from CRM #[id number]. Topic: Medications - Medication Refill  >> Jul 14, 2025 10:45 AM Sera wrote:  Type:  RX Refill Request    Who Called: pt  Refill or New Rx:refill  RX Name and Strength:acyclovir (ZOVIRAX) 400 MG tablet  How is the patient currently taking it? (ex. 1XDay):see chart   Is this a 30 day or 90 day RX:see chart   Preferred Pharmacy with phone number:  [id number] [place] [address] AT [facility] [address]  [address] [address]  Phone: [phone] Fax: [phone]          Local or Mail Order:local  Ordering Provider:John   Would the patient rather a call back or a response via [employer]? call  Best Call Back Number:[phone]    Additional Information: